# Patient Record
Sex: MALE | Race: WHITE | NOT HISPANIC OR LATINO | Employment: FULL TIME | URBAN - METROPOLITAN AREA
[De-identification: names, ages, dates, MRNs, and addresses within clinical notes are randomized per-mention and may not be internally consistent; named-entity substitution may affect disease eponyms.]

---

## 2018-04-16 ENCOUNTER — HOSPITAL ENCOUNTER (OUTPATIENT)
Dept: RADIOLOGY | Facility: HOSPITAL | Age: 38
Discharge: HOME | End: 2018-04-16
Attending: STUDENT IN AN ORGANIZED HEALTH CARE EDUCATION/TRAINING PROGRAM
Payer: COMMERCIAL

## 2018-04-16 ENCOUNTER — TELEPHONE (OUTPATIENT)
Dept: INTERNAL MEDICINE | Facility: CLINIC | Age: 38
End: 2018-04-16

## 2018-04-16 ENCOUNTER — OFFICE VISIT (OUTPATIENT)
Dept: INTERNAL MEDICINE | Facility: CLINIC | Age: 38
End: 2018-04-16
Payer: COMMERCIAL

## 2018-04-16 VITALS
OXYGEN SATURATION: 98 % | DIASTOLIC BLOOD PRESSURE: 80 MMHG | HEIGHT: 74 IN | WEIGHT: 248.88 LBS | HEART RATE: 67 BPM | SYSTOLIC BLOOD PRESSURE: 132 MMHG | TEMPERATURE: 98 F | BODY MASS INDEX: 31.94 KG/M2 | RESPIRATION RATE: 16 BRPM

## 2018-04-16 DIAGNOSIS — I10 ESSENTIAL HYPERTENSION: ICD-10-CM

## 2018-04-16 DIAGNOSIS — M54.50 ACUTE BILATERAL LOW BACK PAIN WITHOUT SCIATICA: Primary | ICD-10-CM

## 2018-04-16 DIAGNOSIS — M54.50 ACUTE MIDLINE LOW BACK PAIN WITHOUT SCIATICA: ICD-10-CM

## 2018-04-16 PROCEDURE — 3079F DIAST BP 80-89 MM HG: CPT | Mod: CPTII,S$GLB,, | Performed by: INTERNAL MEDICINE

## 2018-04-16 PROCEDURE — 72110 X-RAY EXAM L-2 SPINE 4/>VWS: CPT | Mod: TC,PO

## 2018-04-16 PROCEDURE — 3075F SYST BP GE 130 - 139MM HG: CPT | Mod: CPTII,S$GLB,, | Performed by: INTERNAL MEDICINE

## 2018-04-16 PROCEDURE — 99204 OFFICE O/P NEW MOD 45 MIN: CPT | Mod: S$GLB,,, | Performed by: INTERNAL MEDICINE

## 2018-04-16 PROCEDURE — 72110 X-RAY EXAM L-2 SPINE 4/>VWS: CPT | Mod: 26,,, | Performed by: RADIOLOGY

## 2018-04-16 PROCEDURE — 99999 PR PBB SHADOW E&M-NEW PATIENT-LVL IV: CPT | Mod: PBBFAC,,,

## 2018-04-16 RX ORDER — TIZANIDINE 2 MG/1
4 TABLET ORAL EVERY 6 HOURS PRN
Qty: 20 TABLET | Refills: 0 | Status: SHIPPED | OUTPATIENT
Start: 2018-04-16 | End: 2018-04-26

## 2018-04-16 NOTE — PROGRESS NOTES
"Subjective:       Patient ID: Charlie Mazariegos Jr. is a 37 y.o. male.    Chief Complaint: Back Pain (x 2 months )    Mr. Charlie Mazariegos is a 37 year old male with pars defect who presents for evaluation of back pain. He describes lower back pain, which started about one month ago after doing kettle bell swings at home. He describes the pain as stiffness and tightness that radiates to both hips and down both legs. It is worse with long drives and when he wakes up in the morning, and usually gradually gets better throughout the day. He has been taking 400-800mg advil at home with partial relief of the pain. He has not been exercising or stretching as much lately because of the pain. His mother has ankylosing spondylitis. He denies any bladder or bowel dysfunction, saddle anesthesia, weakness, dysuria, or hematuria.     Mr. Mazariegos works as a  and recently had a work physical during which he was told that he had "borderline high blood pressure".       Back Pain   Pertinent negatives include no abdominal pain, chest pain, dysuria, fever or headaches.     Review of Systems   Constitutional: Negative for chills, fatigue, fever and unexpected weight change.   HENT: Negative for rhinorrhea and sore throat.    Eyes: Negative for pain and redness.   Respiratory: Negative for cough and shortness of breath.    Cardiovascular: Negative for chest pain and palpitations.   Gastrointestinal: Negative for abdominal pain, constipation, diarrhea, nausea and vomiting.   Endocrine: Negative for polydipsia and polyuria.   Genitourinary: Negative for dysuria and hematuria.   Musculoskeletal: Positive for back pain. Negative for neck pain.   Skin: Negative for color change and rash.   Neurological: Negative for light-headedness and headaches.   Hematological: Negative for adenopathy. Does not bruise/bleed easily.   Psychiatric/Behavioral: Negative for confusion. The patient is not nervous/anxious.        Objective:      Physical " Exam   Constitutional: He is oriented to person, place, and time. He appears well-developed and well-nourished. No distress.   HENT:   Head: Normocephalic and atraumatic.   Mouth/Throat: Oropharynx is clear and moist.   Eyes: Conjunctivae and EOM are normal. No scleral icterus.   Neck: Normal range of motion. Neck supple.   Cardiovascular: Normal rate, regular rhythm and normal heart sounds.  Exam reveals no gallop and no friction rub.    No murmur heard.  Pulmonary/Chest: Effort normal and breath sounds normal. No respiratory distress. He has no wheezes. He has no rales.   Abdominal: Soft. Bowel sounds are normal. He exhibits no distension. There is no tenderness. There is no guarding.   Musculoskeletal: Normal range of motion. He exhibits no edema.   Able to forward flex at the hip to about 80 degrees, but unable to perform toe touch   Lymphadenopathy:     He has no cervical adenopathy.   Neurological: He is alert and oriented to person, place, and time.   Skin: Skin is warm and dry. No rash noted. He is not diaphoretic.   Psychiatric: He has a normal mood and affect. His behavior is normal.       Assessment:       1. Acute bilateral low back pain without sciatica    2. Essential hypertension        Plan:       1. Given family history of ankylosing spondylitis and personal history of pars defect, will check XR of lumbar spine. Recommend use of tylenol 1000mg q6h prn back pain. Prescribed Zanaflex prn back pain, recommended patient use this at night as needed. Referral to PT   2. Recommended patient attempt lifestyle changes (diet, exercise, weight loss), and measure blood pressure at home or drugstore, and to return to clinic if he continues to see values >130/90, at which point we would start antihypertensive drug therapy    Anjelica Flores, DO  Internal Medicine, PGY-1    I have interviewed and examined the patient w/ the resident,   I agree w/ the impression and plan as outlined above by  her.  Klarissa Albert MD-Staff

## 2022-10-07 ENCOUNTER — TELEPHONE (OUTPATIENT)
Dept: INTERNAL MEDICINE | Facility: CLINIC | Age: 42
End: 2022-10-07
Payer: COMMERCIAL

## 2022-10-07 NOTE — TELEPHONE ENCOUNTER
----- Message from Leti Dobson MA sent at 10/6/2022  5:05 PM CDT -----  Regarding: FW: Appt  Contact: [phone]    ----- Message -----  From: Sterling Deluna  Sent: 10/6/2022   4:58 PM CDT  To: Ivan Carmen Staff  Subject: Appt                                             Patient is calling to speak with someone in office to schedule an appt for back pain wants to be seen tomorrow. Please contact pt

## 2022-10-07 NOTE — TELEPHONE ENCOUNTER
Patient is calling to speak with someone in office to schedule an appt for back pain wants to be seen tomorrow. Please contact pt

## 2023-01-04 DIAGNOSIS — I10 ESSENTIAL HYPERTENSION: ICD-10-CM

## 2023-01-12 ENCOUNTER — OFFICE VISIT (OUTPATIENT)
Dept: FAMILY MEDICINE | Facility: CLINIC | Age: 43
End: 2023-01-12
Payer: COMMERCIAL

## 2023-01-12 VITALS
HEART RATE: 66 BPM | OXYGEN SATURATION: 98 % | SYSTOLIC BLOOD PRESSURE: 138 MMHG | BODY MASS INDEX: 30.16 KG/M2 | WEIGHT: 235 LBS | HEIGHT: 74 IN | DIASTOLIC BLOOD PRESSURE: 72 MMHG

## 2023-01-12 DIAGNOSIS — Z12.5 PROSTATE CANCER SCREENING: ICD-10-CM

## 2023-01-12 DIAGNOSIS — Z82.69 FAMILY HISTORY OF ANKYLOSING SPONDYLITIS: ICD-10-CM

## 2023-01-12 DIAGNOSIS — Z00.00 ANNUAL PHYSICAL EXAM: Primary | ICD-10-CM

## 2023-01-12 DIAGNOSIS — M54.40 LOW BACK PAIN WITH SCIATICA, SCIATICA LATERALITY UNSPECIFIED, UNSPECIFIED BACK PAIN LATERALITY, UNSPECIFIED CHRONICITY: ICD-10-CM

## 2023-01-12 DIAGNOSIS — Z11.59 ENCOUNTER FOR HEPATITIS C SCREENING TEST FOR LOW RISK PATIENT: ICD-10-CM

## 2023-01-12 DIAGNOSIS — R53.83 FATIGUE, UNSPECIFIED TYPE: ICD-10-CM

## 2023-01-12 DIAGNOSIS — Z53.20 HIV SCREENING DECLINED: ICD-10-CM

## 2023-01-12 DIAGNOSIS — R19.7 DIARRHEA, UNSPECIFIED TYPE: ICD-10-CM

## 2023-01-12 PROCEDURE — 3075F PR MOST RECENT SYSTOLIC BLOOD PRESS GE 130-139MM HG: ICD-10-PCS | Mod: CPTII,S$GLB,, | Performed by: INTERNAL MEDICINE

## 2023-01-12 PROCEDURE — 3008F PR BODY MASS INDEX (BMI) DOCUMENTED: ICD-10-PCS | Mod: CPTII,S$GLB,, | Performed by: INTERNAL MEDICINE

## 2023-01-12 PROCEDURE — 3075F SYST BP GE 130 - 139MM HG: CPT | Mod: CPTII,S$GLB,, | Performed by: INTERNAL MEDICINE

## 2023-01-12 PROCEDURE — 1159F MED LIST DOCD IN RCRD: CPT | Mod: CPTII,S$GLB,, | Performed by: INTERNAL MEDICINE

## 2023-01-12 PROCEDURE — 1159F PR MEDICATION LIST DOCUMENTED IN MEDICAL RECORD: ICD-10-PCS | Mod: CPTII,S$GLB,, | Performed by: INTERNAL MEDICINE

## 2023-01-12 PROCEDURE — 99204 PR OFFICE/OUTPT VISIT, NEW, LEVL IV, 45-59 MIN: ICD-10-PCS | Mod: S$GLB,,, | Performed by: INTERNAL MEDICINE

## 2023-01-12 PROCEDURE — 3008F BODY MASS INDEX DOCD: CPT | Mod: CPTII,S$GLB,, | Performed by: INTERNAL MEDICINE

## 2023-01-12 PROCEDURE — 3078F DIAST BP <80 MM HG: CPT | Mod: CPTII,S$GLB,, | Performed by: INTERNAL MEDICINE

## 2023-01-12 PROCEDURE — 1160F PR REVIEW ALL MEDS BY PRESCRIBER/CLIN PHARMACIST DOCUMENTED: ICD-10-PCS | Mod: CPTII,S$GLB,, | Performed by: INTERNAL MEDICINE

## 2023-01-12 PROCEDURE — 99204 OFFICE O/P NEW MOD 45 MIN: CPT | Mod: S$GLB,,, | Performed by: INTERNAL MEDICINE

## 2023-01-12 PROCEDURE — 99999 PR PBB SHADOW E&M-EST. PATIENT-LVL V: CPT | Mod: PBBFAC,,, | Performed by: INTERNAL MEDICINE

## 2023-01-12 PROCEDURE — 3078F PR MOST RECENT DIASTOLIC BLOOD PRESSURE < 80 MM HG: ICD-10-PCS | Mod: CPTII,S$GLB,, | Performed by: INTERNAL MEDICINE

## 2023-01-12 PROCEDURE — 1160F RVW MEDS BY RX/DR IN RCRD: CPT | Mod: CPTII,S$GLB,, | Performed by: INTERNAL MEDICINE

## 2023-01-12 PROCEDURE — 99999 PR PBB SHADOW E&M-EST. PATIENT-LVL V: ICD-10-PCS | Mod: PBBFAC,,, | Performed by: INTERNAL MEDICINE

## 2023-01-12 RX ORDER — NAPROXEN 500 MG/1
500 TABLET ORAL 2 TIMES DAILY PRN
Qty: 30 TABLET | Refills: 2 | Status: SHIPPED | OUTPATIENT
Start: 2023-01-12

## 2023-01-12 RX ORDER — METHOCARBAMOL 500 MG/1
500 TABLET, FILM COATED ORAL 2 TIMES DAILY PRN
Qty: 20 TABLET | Refills: 0 | Status: SHIPPED | OUTPATIENT
Start: 2023-01-12

## 2023-01-12 RX ORDER — NAPROXEN 500 MG/1
500 TABLET ORAL 2 TIMES DAILY PRN
Qty: 30 TABLET | Refills: 2 | Status: SHIPPED | OUTPATIENT
Start: 2023-01-12 | End: 2023-01-12

## 2023-01-12 NOTE — PROGRESS NOTES
Subjective:       Patient ID: Charlie Mazariegos Jr. is a 42 y.o. male.    Chief Complaint: No chief complaint on file.      HPI  Charlie Mazariegos Jr. is a 42 y.o. male with chronic conditions of hypertension, who presents today to [facility] care.    Patient reports has not had an evaluation with a primary care in over a year.     Has been having a low back pain.  A few weeks ago the pain was significant and limiting.  Took ibuprofen or Tylenol and modified his activities with slow improvement.  The pain was in his lower back and went down his legs.  Denies numbness or weakness.  Now has a mild pain to do 3/10 on his lower back.  His mother recently  and had history of ankylosing spondylitis.  A few years ago he had a back pain while in college and was told he had pars defect.  On an x-ray done a few years ago at [facility] the defect was not found.  The pain has been on an off and he is concern that the severe pain he had a few weeks ago may return.      Recently has been having episodes of diarrhea.  Can not assess see symptoms to specific foods.  With diarrhea has been having upper abdominal discomfort on and off. There has been no significant weight changes but feels tired with his symptoms.      He exercises regularly, 5-6 days of the week weight lifting, which he modify due to his back pains.  Does not follow any special diet.    Health Maintenance:  Health Maintenance   Topic Date Due    Hepatitis C Screening  Never done    TETANUS VACCINE  Never done    Lipid Panel  2019       Review of Systems   Constitutional:  Positive for fatigue. Negative for fever and unexpected weight change.   HENT: Negative.     Respiratory: Negative.     Cardiovascular: Negative.    Gastrointestinal:  Positive for abdominal pain and diarrhea.   Genitourinary:  Negative for difficulty urinating.   Musculoskeletal:  Positive for back pain.   Integumentary:  Negative.   Neurological: Negative.  Negative for headaches.    Psychiatric/Behavioral: Negative.      No past medical history on file.    Past Surgical History:   Procedure Laterality Date    CERVICAL FUSION  2011    C5-C6    SHOULDER SURGERY         Family History   Problem Relation Age of Onset    Osteoarthritis Mother     Ankylosing spondylitis Mother     COPD Mother     Glaucoma Mother     Arthritis Mother         Ankylosing spondylitis    Vision loss Mother         Glaucoma and lenses removed    Gout Father     Breast cancer Maternal Grandmother     Cancer Maternal Grandmother         Breast cancer    Cirrhosis Maternal Grandfather     Alcohol abuse Maternal Grandfather     Colon cancer Paternal Grandmother     Cancer Paternal Grandmother         Colon cancer    No Known Problems Paternal Grandfather        Social History     Socioeconomic History    Marital status: Single   Tobacco Use    Smoking status: Never    Smokeless tobacco: Never   Substance and Sexual Activity    Alcohol use: Yes     Alcohol/week: 3.0 standard drinks     Types: 3 Cans of beer per week     Comment: Occasionally drink on weekends    Drug use: No    Sexual activity: Yes     Partners: Female     Birth control/protection: Condom       Current Outpatient Medications   Medication Sig Dispense Refill    fluticasone (FLONASE) 50 mcg/actuation nasal spray 1 spray by Each Nare route once daily. 16 g 3    ibuprofen (ADVIL,MOTRIN) 200 MG tablet Take 200 mg by mouth every 8 (eight) hours as needed.       No current facility-administered medications for this visit.       Review of patient's allergies indicates:  No Known Allergies      Objective:       Last 3 sets of Vitals    Vitals - 1 value per visit 4/16/2018 4/16/2018 4/16/2018   SYSTOLIC - 136 132   DIASTOLIC - 90 80   Pulse - 67 -   Temp - 97.5 -   Resp - 16 -   SPO2 - 98 -   Weight (lb) - 248.9 -   Weight (kg) - 112.9 -   Height - 74.000 -   BMI (Calculated) - 32 -   VISIT REPORT - - -   Pain Score  7 - -   Physical Exam  Constitutional:        General: He is not in acute distress.  HENT:      Head: Normocephalic.      Right Ear: Tympanic membrane, ear canal and external ear normal.      Left Ear: Tympanic membrane, ear canal and external ear normal.      Nose: Nose normal.      Mouth/Throat:      Mouth: Mucous membranes are moist.   Eyes:      General: No scleral icterus.     Extraocular Movements: Extraocular movements intact.      Conjunctiva/sclera: Conjunctivae normal.   Neck:      Vascular: No carotid bruit.      Comments: No goiter.  Cardiovascular:      Rate and Rhythm: Normal rate and regular rhythm.      Pulses: Normal pulses.      Heart sounds: Normal heart sounds.   Pulmonary:      Effort: Pulmonary effort is normal.      Breath sounds: Normal breath sounds.   Abdominal:      General: Bowel sounds are normal. There is no distension.      Palpations: Abdomen is soft. There is no mass.      Tenderness: There is no abdominal tenderness.   Musculoskeletal:         General: No swelling. Tenderness: reports discomfort of lumbar area palpation.  Lymphadenopathy:      Cervical: No cervical adenopathy.   Skin:     General: Skin is warm and dry.   Neurological:      General: No focal deficit present.      Mental Status: He is alert and oriented to person, place, and time. Mental status is at baseline.   Psychiatric:         Mood and Affect: Mood normal.         Behavior: Behavior normal.         CBC:      CMP:      URINALYSIS:       LIPIDS:      TSH:        A1C:        Imaging:  X-Ray Lumbar Spine Complete 5 View  Narrative: EXAMINATION:  XR LUMBAR SPINE COMPLETE 5 VIEW    CLINICAL HISTORY:  Low back pain, <6wks, no red flags, no prior management;mother with ankylosing spondylitis;  Low back pain    FINDINGS:  No fracture dislocation bone destruction seen.  There is no significant DJD.  No pars defects are seen.  Impression: See above    No significant abnormality seen.    Electronically signed by: Patrick Ochoa  MD  Date:    04/16/2018  Time:    14:49      Assessment:       1. Annual physical exam    2. Diarrhea, unspecified type    3. Fatigue, unspecified type    4. Low back pain with sciatica, sciatica laterality unspecified, unspecified back pain laterality, unspecified chronicity    5. Family history of ankylosing spondylitis    6. Prostate cancer screening    7. Encounter for hepatitis C screening test for low risk patient    8. HIV screening declined            Plan:       1. Annual physical exam  -     CBC Auto Differential; Future; Expected date: 01/12/2023  -     Comprehensive Metabolic Panel; Future; Expected date: 01/12/2023  -     Hemoglobin A1C; Future; Expected date: 01/12/2023  -     Lipid Panel; Future; Expected date: 01/12/2023  -     TSH; Future; Expected date: 01/12/2023  -     PSA, Screening; Future; Expected date: 01/12/2023  -     Urinalysis, Reflex to Urine Culture Urine, Clean Catch; Future; Expected date: 01/12/2023  - stable exam with normal vital signs.  BMI borderline as he is at 30 however he would start with weightlifting and has higher muscle mass.    2. Diarrhea, unspecified type  -     US Abdomen Complete; Future; Expected date: 01/12/2023- considering abdominal discomfort    3. Fatigue, unspecified type  -     Vitamin D; Future; Expected date: 01/12/2023  -     T4, Free; Future; Expected date: 01/12/2023  -     Ferritin; Future; Expected date: 01/12/2023  -     Iron and TIBC; Future; Expected date: 01/12/2023  -     Vitamin B12; Future; Expected date: 01/12/2023  -     Folate; Future; Expected date: 01/12/2023  -     THYROTROPIN RECEPTOR ANTIBODY; Future; Expected date: 01/12/2023  -     THYROID PEROXIDASE ANTIBODY; Future; Expected date: 01/12/2023  -     TESTOSTERONE; Future; Expected date: 01/12/2023  - CRP    4. Low back pain with sciatica, sciatica laterality unspecified, unspecified back pain laterality, unspecified chronicity  -     has concerns family history.  - HLA B27 ANTIGEN;  Future; Expected date: 01/12/2023  -     methocarbamoL (ROBAXIN) 500 MG Tab; Take 1 tablet (500 mg total) by mouth 2 (two) times daily as needed (spasm).  Dispense: 20 tablet; Refill: 0  -     naproxen (NAPROSYN) 500 MG tablet; Take 1 tablet (500 mg total) by mouth 2 (two) times daily as needed (pain).  Dispense: 30 tablet; Refill: 2  -     Urinalysis, Reflex to Urine Culture Urine, Clean Catch; Future; Expected date: 01/12/2023  -     C-Reactive Protein; Future; Expected date: 01/13/2023  -     Sedimentation rate; Future; Expected date: 01/13/2023  -     X-Ray Pelvis Routine AP; Future; Expected date: 01/13/2023  -     X-Ray Lumbar Spine AP And Lateral; Future; Expected date: 01/13/2023    5. Family history of ankylosing spondylitis  -     Ambulatory referral/consult to Genetics; Future; Expected date: 01/12/2023  - if any findings suggestive of inflammatory process will consider rheumatology evaluation    6. Prostate cancer screening  -     PSA, Screening; Future; Expected date: 01/12/2023    7. Encounter for hepatitis C screening test for low risk patient  -     Hepatitis C Antibody; Future; Expected date: 01/12/2023    8. HIV screening declined  -     HIV 1/2 Ag/Ab (4th Gen); Future; Expected date: 01/12/2023           Health Maintenance Due   Topic Date Due    Hepatitis C Screening  Never done    COVID-19 Vaccine (1) Never done    HIV Screening  Never done    TETANUS VACCINE  Never done    Lipid Panel  05/31/2019    Influenza Vaccine (1) Never done            Return to clinic in 1-2 months.    Kanwal Yepez MD  [facility]  Disclaimer:  This note has been generated using voice-recognition software. There may be grammatical or spelling errors that have been missed during proof-reading

## 2023-01-18 ENCOUNTER — LAB VISIT (OUTPATIENT)
Dept: LAB | Facility: HOSPITAL | Age: 43
End: 2023-01-18
Attending: INTERNAL MEDICINE
Payer: COMMERCIAL

## 2023-01-18 DIAGNOSIS — M54.40 LOW BACK PAIN WITH SCIATICA, SCIATICA LATERALITY UNSPECIFIED, UNSPECIFIED BACK PAIN LATERALITY, UNSPECIFIED CHRONICITY: ICD-10-CM

## 2023-01-18 DIAGNOSIS — Z00.00 ANNUAL PHYSICAL EXAM: ICD-10-CM

## 2023-01-18 LAB
BILIRUB UR QL STRIP: NEGATIVE
CLARITY UR: CLEAR
COLOR UR: YELLOW
GLUCOSE UR QL STRIP: NEGATIVE
HGB UR QL STRIP: NEGATIVE
KETONES UR QL STRIP: NEGATIVE
LEUKOCYTE ESTERASE UR QL STRIP: NEGATIVE
NITRITE UR QL STRIP: NEGATIVE
PH UR STRIP: 6 [PH] (ref 5–8)
PROT UR QL STRIP: ABNORMAL
SP GR UR STRIP: 1.02 (ref 1–1.03)
URN SPEC COLLECT METH UR: ABNORMAL
UROBILINOGEN UR STRIP-ACNC: NEGATIVE EU/DL

## 2023-01-18 PROCEDURE — 81003 URINALYSIS AUTO W/O SCOPE: CPT | Performed by: INTERNAL MEDICINE

## 2023-01-19 ENCOUNTER — PATIENT MESSAGE (OUTPATIENT)
Dept: FAMILY MEDICINE | Facility: CLINIC | Age: 43
End: 2023-01-19
Payer: COMMERCIAL

## 2023-01-19 DIAGNOSIS — R19.7 DIARRHEA, UNSPECIFIED TYPE: Primary | ICD-10-CM

## 2023-01-19 RX ORDER — CHOLECALCIFEROL (VITAMIN D3) 25 MCG
1 TABLET,CHEWABLE ORAL DAILY
Qty: 90 CAPSULE | Refills: 2 | Status: SHIPPED | OUTPATIENT
Start: 2023-01-19

## 2023-01-24 ENCOUNTER — TELEPHONE (OUTPATIENT)
Dept: ADMINISTRATIVE | Facility: OTHER | Age: 43
End: 2023-01-24
Payer: COMMERCIAL

## 2023-01-26 ENCOUNTER — HOSPITAL ENCOUNTER (OUTPATIENT)
Dept: RADIOLOGY | Facility: HOSPITAL | Age: 43
Discharge: HOME | End: 2023-01-26
Attending: INTERNAL MEDICINE
Payer: COMMERCIAL

## 2023-01-26 DIAGNOSIS — M54.40 LOW BACK PAIN WITH SCIATICA, SCIATICA LATERALITY UNSPECIFIED, UNSPECIFIED BACK PAIN LATERALITY, UNSPECIFIED CHRONICITY: ICD-10-CM

## 2023-01-26 DIAGNOSIS — R19.7 DIARRHEA, UNSPECIFIED TYPE: ICD-10-CM

## 2023-01-26 PROCEDURE — 72100 XR LUMBAR SPINE AP AND LATERAL: ICD-10-PCS | Mod: 26,,, | Performed by: RADIOLOGY

## 2023-01-26 PROCEDURE — 76700 US EXAM ABDOM COMPLETE: CPT | Mod: TC

## 2023-01-26 PROCEDURE — 72100 X-RAY EXAM L-S SPINE 2/3 VWS: CPT | Mod: TC

## 2023-01-26 PROCEDURE — 72100 X-RAY EXAM L-S SPINE 2/3 VWS: CPT | Mod: 26,,, | Performed by: RADIOLOGY

## 2023-01-26 PROCEDURE — 76700 US EXAM ABDOM COMPLETE: CPT | Mod: 26,,, | Performed by: RADIOLOGY

## 2023-01-26 PROCEDURE — 72170 X-RAY EXAM OF PELVIS: CPT | Mod: 26,,, | Performed by: RADIOLOGY

## 2023-01-26 PROCEDURE — 76700 US ABDOMEN COMPLETE: ICD-10-PCS | Mod: 26,,, | Performed by: RADIOLOGY

## 2023-01-26 PROCEDURE — 72170 X-RAY EXAM OF PELVIS: CPT | Mod: TC

## 2023-01-26 PROCEDURE — 72170 XR PELVIS ROUTINE AP: ICD-10-PCS | Mod: 26,,, | Performed by: RADIOLOGY

## 2023-01-29 ENCOUNTER — PATIENT MESSAGE (OUTPATIENT)
Dept: FAMILY MEDICINE | Facility: CLINIC | Age: 43
End: 2023-01-29
Payer: COMMERCIAL

## 2023-04-04 ENCOUNTER — OFFICE VISIT (OUTPATIENT)
Dept: FAMILY MEDICINE | Facility: CLINIC | Age: 43
End: 2023-04-04
Payer: COMMERCIAL

## 2023-04-04 VITALS
SYSTOLIC BLOOD PRESSURE: 122 MMHG | DIASTOLIC BLOOD PRESSURE: 82 MMHG | OXYGEN SATURATION: 98 % | BODY MASS INDEX: 30.7 KG/M2 | HEIGHT: 74 IN | WEIGHT: 239.19 LBS | TEMPERATURE: 98 F | HEART RATE: 71 BPM

## 2023-04-04 DIAGNOSIS — M54.40 LOW BACK PAIN WITH SCIATICA, SCIATICA LATERALITY UNSPECIFIED, UNSPECIFIED BACK PAIN LATERALITY, UNSPECIFIED CHRONICITY: Primary | ICD-10-CM

## 2023-04-04 DIAGNOSIS — R03.0 DIASTOLIC BLOOD PRESSURE 80 TO 89 MM HG: ICD-10-CM

## 2023-04-04 DIAGNOSIS — E55.9 VITAMIN D DEFICIENCY: ICD-10-CM

## 2023-04-04 PROCEDURE — 90715 TDAP VACCINE 7 YRS/> IM: CPT | Mod: S$GLB,,, | Performed by: INTERNAL MEDICINE

## 2023-04-04 PROCEDURE — 90715 TDAP VACCINE GREATER THAN OR EQUAL TO 7YO IM: ICD-10-PCS | Mod: S$GLB,,, | Performed by: INTERNAL MEDICINE

## 2023-04-04 PROCEDURE — 90471 IMMUNIZATION ADMIN: CPT | Mod: S$GLB,,, | Performed by: INTERNAL MEDICINE

## 2023-04-04 PROCEDURE — 3008F BODY MASS INDEX DOCD: CPT | Mod: CPTII,S$GLB,, | Performed by: INTERNAL MEDICINE

## 2023-04-04 PROCEDURE — 1159F PR MEDICATION LIST DOCUMENTED IN MEDICAL RECORD: ICD-10-PCS | Mod: CPTII,S$GLB,, | Performed by: INTERNAL MEDICINE

## 2023-04-04 PROCEDURE — 90471 TDAP VACCINE GREATER THAN OR EQUAL TO 7YO IM: ICD-10-PCS | Mod: S$GLB,,, | Performed by: INTERNAL MEDICINE

## 2023-04-04 PROCEDURE — 99214 OFFICE O/P EST MOD 30 MIN: CPT | Mod: 25,S$GLB,, | Performed by: INTERNAL MEDICINE

## 2023-04-04 PROCEDURE — 1160F PR REVIEW ALL MEDS BY PRESCRIBER/CLIN PHARMACIST DOCUMENTED: ICD-10-PCS | Mod: CPTII,S$GLB,, | Performed by: INTERNAL MEDICINE

## 2023-04-04 PROCEDURE — 3079F PR MOST RECENT DIASTOLIC BLOOD PRESSURE 80-89 MM HG: ICD-10-PCS | Mod: CPTII,S$GLB,, | Performed by: INTERNAL MEDICINE

## 2023-04-04 PROCEDURE — 3044F PR MOST RECENT HEMOGLOBIN A1C LEVEL <7.0%: ICD-10-PCS | Mod: CPTII,S$GLB,, | Performed by: INTERNAL MEDICINE

## 2023-04-04 PROCEDURE — 3074F PR MOST RECENT SYSTOLIC BLOOD PRESSURE < 130 MM HG: ICD-10-PCS | Mod: CPTII,S$GLB,, | Performed by: INTERNAL MEDICINE

## 2023-04-04 PROCEDURE — 99214 PR OFFICE/OUTPT VISIT, EST, LEVL IV, 30-39 MIN: ICD-10-PCS | Mod: 25,S$GLB,, | Performed by: INTERNAL MEDICINE

## 2023-04-04 PROCEDURE — 99999 PR PBB SHADOW E&M-EST. PATIENT-LVL IV: CPT | Mod: PBBFAC,,, | Performed by: INTERNAL MEDICINE

## 2023-04-04 PROCEDURE — 99999 PR PBB SHADOW E&M-EST. PATIENT-LVL IV: ICD-10-PCS | Mod: PBBFAC,,, | Performed by: INTERNAL MEDICINE

## 2023-04-04 PROCEDURE — 1160F RVW MEDS BY RX/DR IN RCRD: CPT | Mod: CPTII,S$GLB,, | Performed by: INTERNAL MEDICINE

## 2023-04-04 PROCEDURE — 3044F HG A1C LEVEL LT 7.0%: CPT | Mod: CPTII,S$GLB,, | Performed by: INTERNAL MEDICINE

## 2023-04-04 PROCEDURE — 3079F DIAST BP 80-89 MM HG: CPT | Mod: CPTII,S$GLB,, | Performed by: INTERNAL MEDICINE

## 2023-04-04 PROCEDURE — 3008F PR BODY MASS INDEX (BMI) DOCUMENTED: ICD-10-PCS | Mod: CPTII,S$GLB,, | Performed by: INTERNAL MEDICINE

## 2023-04-04 PROCEDURE — 1159F MED LIST DOCD IN RCRD: CPT | Mod: CPTII,S$GLB,, | Performed by: INTERNAL MEDICINE

## 2023-04-04 PROCEDURE — 3074F SYST BP LT 130 MM HG: CPT | Mod: CPTII,S$GLB,, | Performed by: INTERNAL MEDICINE

## 2023-04-04 RX ORDER — CHOLECALCIFEROL (VITAMIN D3) 50 MCG
1 TABLET ORAL DAILY
Qty: 90 TABLET | Refills: 3 | Status: SHIPPED | OUTPATIENT
Start: 2023-04-04

## 2023-04-04 NOTE — PROGRESS NOTES
Subjective:       Patient ID: Charlie Mazariegos Jr. is a 42 y.o. male.    Chief Complaint: Follow-up (8 week/)      HPI  Charlie Mazariegos Jr. is a 42 y.o. male with chronic conditions of HTN who presents today for follow-up of evaluation of fatigue, and back pain.    Reports his back pain has improved and has not needed to use a anti-inflammatories or the muscle relaxers.    Workup was overall within normal limits except for low vitamin-D and B12 on the low side.  The CBC was within normal limits, iron and folic acid are normal, B12 is normal on the low side.  Sed rate and CRP were normal.  Metabolic panel with good kidney and liver function.  Glucose and A1c are normal.  Lipid profile looks fine.  Thyroid function test is within normal limits.  Testosterone and PSA are normal.  Vitamin-D was low.    Lumbar and pelvic x-rays show degenerative joint disease and no significant abnormality.  No sacroiliitis.  Abdominal ultrasound normal.    Has no toxic habits.  Exercises with weight lifting or training.  Denies concerning exertional symptoms.    Health Maintenance:  Health Maintenance   Topic Date Due    Lipid Panel  01/18/2028    TETANUS VACCINE  04/04/2033    Hepatitis C Screening  Completed       Review of Systems   Constitutional: Negative.  Negative for fever and unexpected weight change.   HENT: Negative.     Respiratory: Negative.     Cardiovascular: Negative.    Gastrointestinal: Negative.    Genitourinary:  Negative for difficulty urinating.   Musculoskeletal: Negative.    Integumentary:  Negative.   Neurological: Negative.    Psychiatric/Behavioral:  Positive for sleep disturbance (related to working hours).     Past Medical History:   Diagnosis Date    Pre-hypertension        Past Surgical History:   Procedure Laterality Date    CERVICAL FUSION  01/01/2011    C5-C6    FRACTURE SURGERY      Unknown date neck surgery    SHOULDER SURGERY      SPINE SURGERY      Unknown date neck surgery       Family History    Problem Relation Age of Onset    Osteoarthritis Mother     Ankylosing spondylitis Mother     COPD Mother     Glaucoma Mother     Arthritis Mother         Ankylosing spondylitis    Vision loss Mother         Glaucoma and lenses removed    Gout Father     Breast cancer Maternal Grandmother     Cancer Maternal Grandmother         Breast cancer    Cirrhosis Maternal Grandfather     Alcohol abuse Maternal Grandfather     Colon cancer Paternal Grandmother     Cancer Paternal Grandmother         Colon cancer    No Known Problems Paternal Grandfather        Social History     Socioeconomic History    Marital status: Single   Tobacco Use    Smoking status: Never    Smokeless tobacco: Never   Substance and Sexual Activity    Alcohol use: Yes     Alcohol/week: 3.0 standard drinks     Types: 3 Cans of beer per week     Comment: Occasionally drink on weekends    Drug use: No    Sexual activity: Yes     Partners: Female     Birth control/protection: Condom       Current Outpatient Medications   Medication Sig Dispense Refill    cholecalciferol, vitamin D3, (VITAMIN D3) 50 mcg (2,000 unit) Tab Take 1 tablet (2,000 Units total) by mouth once daily. 90 tablet 3    cyanocobalamin, vitamin B-12, 1,000 mcg Cap Take 1 tablet by mouth Daily. (Patient not taking: Reported on 4/4/2023) 90 capsule 2    fluticasone (FLONASE) 50 mcg/actuation nasal spray 1 spray by Each Nare route once daily. (Patient not taking: Reported on 4/4/2023) 16 g 3    methocarbamoL (ROBAXIN) 500 MG Tab Take 1 tablet (500 mg total) by mouth 2 (two) times daily as needed (spasm). (Patient not taking: Reported on 4/4/2023) 20 tablet 0    naproxen (NAPROSYN) 500 MG tablet Take 1 tablet (500 mg total) by mouth 2 (two) times daily as needed (pain). (Patient not taking: Reported on 4/4/2023) 30 tablet 2     No current facility-administered medications for this visit.       Review of patient's allergies indicates:  No Known Allergies      Objective:       Last 3 sets of  Vitals    Vitals - 1 value per visit 1/12/2023 4/4/2023 4/4/2023   SYSTOLIC 138 - 122   DIASTOLIC 72 - 82   Pulse 66 - 71   Temp - - 97.9   Resp - - -   SPO2 98 - 98   Weight (lb) 235.01 - 239.2   Weight (kg) 106.6 - 108.5   Height 74 - 74   BMI (Calculated) 30.2 - 30.7   VISIT REPORT - - -   Pain Score  - 0 -   Physical Exam  Constitutional:       General: He is not in acute distress.  HENT:      Head: Normocephalic.      Right Ear: Tympanic membrane, ear canal and external ear normal.      Left Ear: Tympanic membrane, ear canal and external ear normal.      Nose: Nose normal.      Mouth/Throat:      Mouth: Mucous membranes are moist.   Eyes:      General: No scleral icterus.     Extraocular Movements: Extraocular movements intact.      Conjunctiva/sclera: Conjunctivae normal.   Neck:      Vascular: No carotid bruit.      Comments: No goiter.  Cardiovascular:      Rate and Rhythm: Normal rate and regular rhythm.      Pulses: Normal pulses.      Heart sounds: Normal heart sounds.   Pulmonary:      Effort: Pulmonary effort is normal.      Breath sounds: Normal breath sounds.   Abdominal:      General: Bowel sounds are normal. There is no distension.      Palpations: Abdomen is soft. There is no mass.      Tenderness: There is no abdominal tenderness.   Musculoskeletal:         General: No swelling.   Lymphadenopathy:      Cervical: No cervical adenopathy.   Skin:     General: Skin is warm and dry.   Neurological:      General: No focal deficit present.      Mental Status: He is alert and oriented to person, place, and time.   Psychiatric:         Mood and Affect: Mood normal.         Behavior: Behavior normal.         CBC:  Recent Labs   Lab 01/18/23  0730   WBC 4.26   RBC 4.91   Hemoglobin 15.1   Hematocrit 42.8   Platelets 198   MCV 87   MCH 30.8   MCHC 35.3     CMP:  Recent Labs   Lab 01/18/23  0730   Glucose 85   Calcium 9.0   Albumin 4.0   Total Protein 7.2   Sodium 141   Potassium 3.7   CO2 25   Chloride 105    BUN 9   Creatinine 1.0   Alkaline Phosphatase 77   ALT 23   AST 21   Total Bilirubin 0.8     URINALYSIS:  Recent Labs   Lab 01/18/23  0646   Color, UA Yellow   Specific Gravity, UA 1.020   pH, UA 6.0   Protein, UA Trace A   Nitrite, UA Negative   Leukocytes, UA Negative   Urobilinogen, UA Negative      LIPIDS:  Recent Labs   Lab 01/18/23  0730   TSH 3.569   HDL 45   Cholesterol 172   Triglycerides 91   LDL Cholesterol 108.8   HDL/Cholesterol Ratio 26.2   Non-HDL Cholesterol 127   Total Cholesterol/HDL Ratio 3.8     TSH:  Recent Labs   Lab 01/18/23  0730   TSH 3.569       A1C:  Recent Labs   Lab 01/18/23  0730   Hemoglobin A1C 5.2       Imaging:  X-Ray Pelvis Routine AP  Narrative: EXAMINATION:  XR PELVIS ROUTINE AP    CLINICAL HISTORY:  Lumbago with sciatica, unspecified side    TECHNIQUE:  AP view of the pelvis was performed.    COMPARISON:  02/26/2011    FINDINGS:  Single-view pelvis.    The bilateral sacroiliac joints are intact.  The pubic symphysis is intact noting degenerative changes.  The bilateral femoroacetabular joints are intact.  Impression: 1. No acute displaced fracture or dislocation of the pelvis.    Electronically signed by: Arun Hdz MD  Date:    01/26/2023  Time:    08:49  X-Ray Lumbar Spine AP And Lateral  Narrative: EXAMINATION:  XR LUMBAR SPINE AP AND LATERAL    CLINICAL HISTORY:  Lumbago with sciatica, unspecified side    TECHNIQUE:  AP, lateral and spot images were performed of the lumbar spine.    COMPARISON:  04/16/2018    FINDINGS:  Three views lumbar spine.    Lateral imaging demonstrates adequate alignment of the lumbar spine without significant vertebral body height loss.  There is disc space height loss at L5-S1, progressed since the previous exam.  There is lower lumbar facet arthropathy, also progressed since the previous exam.  The sacral segments are aligned.  AP spinal alignment is unremarkable.  The bilateral sacroiliac joints are intact.  Impression: 1. No acute  displaced fracture or dislocation of the lumbar spine noting degenerative changes as above.    Electronically signed by: Arun Hdz MD  Date:    01/26/2023  Time:    08:48  US Abdomen Complete  Narrative: EXAMINATION:  US ABDOMEN COMPLETE    CLINICAL HISTORY:  Diarrhea, unspecified    TECHNIQUE:  Complete abdominal ultrasound (including pancreas, liver, gallbladder, common bile duct, spleen, aorta, IVC, and kidneys) was performed.    COMPARISON:  None    FINDINGS:  Pancreas: The visualized portions of pancreas appear normal.    Aorta: No aneurysm.    Liver: 15.2 cm, normal in size. Homogeneous parenchymal echotexture. No focal lesions. HRI: 1.1, normal.    Gallbladder: No calculi, wall thickening, or pericholecystic fluid.  No wall hyperemia.  Negative sonographic Graves's sign.    Biliary system: 3 mm common bile duct.  No intrahepatic ductal dilatation.    Inferior vena cava: Normal in appearance.    Right kidney: 10.6 cm. No hydronephrosis.    Left kidney: 11.4 cm. No hydronephrosis.    Spleen: 12.6 x 4.7 cm.  Normal in size with homogeneous echotexture.    Miscellaneous: No ascites.  Impression: No sonographic abnormality.    Electronically signed by resident: Kieran Ellis  Date:    01/26/2023  Time:    07:54    Electronically signed by: Man Kelly MD  Date:    01/26/2023  Time:    08:08      Assessment:       1. Low back pain with sciatica, sciatica laterality unspecified, unspecified back pain laterality, unspecified chronicity    2. Diastolic blood pressure 80 to 89 mm Hg            Plan:       1. Low back pain with sciatica, sciatica laterality unspecified, unspecified back pain laterality, unspecified chronicity  - Improved.  Likely spasms.  Continue anti-inflammatories and muscle relaxers as needed.     2. Diastolic blood pressure 80 to 89 mm Hg   - Continue lifestyle modifications with diet and exercise, weight control.    3. Vitamin D deficiency  -     cholecalciferol, vitamin D3, (VITAMIN D3)  50 mcg (2,000 unit) Tab; Take 1 tablet (2,000 Units total) by mouth once daily.  Dispense: 90 tablet; Refill: 3    Other orders  -     (In Office Administered) Tdap Vaccine  - B12 supplements would be reasonable       Health Maintenance Due   Topic Date Due    COVID-19 Vaccine (3 - Booster for Pfizer series) 04/15/2021    Influenza Vaccine (1) Never done            Return to clinic yearly or as needed.    Kanwal Yepez MD  [facility]  Disclaimer:  This note has been generated using voice-recognition software. There may be grammatical or spelling errors that have been missed during proof-reading

## 2025-02-21 ENCOUNTER — TELEPHONE (OUTPATIENT)
Dept: ORTHOPEDICS | Facility: CLINIC | Age: 45
End: 2025-02-21
Payer: COMMERCIAL

## 2025-02-21 NOTE — TELEPHONE ENCOUNTER
Spoke w/ pt to schedule appt for frac, he's schedule to see Lisandra 2/25/25.    ----- Message from Med Assistant Couch sent at 2/21/2025  1:35 PM CST -----  Regarding: RE: call back  He was but he needs to be scheduled in hand clinic per Laurie Tran.  ----- Message -----  From: Shelia Ma MA  Sent: 2/21/2025   1:30 PM CST  To: Iza Boland MA  Subject: FW: call back                                    Lotus,Just called the pt and he stated he was told by someone to keep the Ortho appt.  ----- Message -----  From: Iza Boland MA  Sent: 2/21/2025   1:24 PM CST  To: [facility] Hand Clinical Staff; [facility] Hand Clinical#  Subject: FW: call back                                    Pt has a fx elbow  ----- Message -----  From: Catina Sinclair  Sent: 2/21/2025  12:18 PM CST  To: [facility] Ortho Clinical Support  Subject: call back                                        Type: Patient Call BackWho called: pt What is the request in detail: requesting call to see if he needs to keep appt for ortho on Monday or be rescheduled for the Hand clinic? Says staff called him today but no notes in chart. Can the clinic reply by [facility]?noWould the patient rather a call back or a response via [facility]? [facility]: [phone] Additional Information:

## 2025-02-24 ENCOUNTER — HOSPITAL ENCOUNTER (OUTPATIENT)
Dept: RADIOLOGY | Facility: OTHER | Age: 45
Discharge: HOME | End: 2025-02-24
Payer: COMMERCIAL

## 2025-02-24 ENCOUNTER — OFFICE VISIT (OUTPATIENT)
Dept: ORTHOPEDICS | Facility: CLINIC | Age: 45
End: 2025-02-24
Payer: COMMERCIAL

## 2025-02-24 ENCOUNTER — PATIENT MESSAGE (OUTPATIENT)
Dept: PREADMISSION TESTING | Facility: HOSPITAL | Age: 45
End: 2025-02-24
Payer: COMMERCIAL

## 2025-02-24 DIAGNOSIS — S42.402A CLOSED FRACTURE OF LEFT ELBOW, INITIAL ENCOUNTER: ICD-10-CM

## 2025-02-24 DIAGNOSIS — M25.522 LEFT ELBOW PAIN: Primary | ICD-10-CM

## 2025-02-24 DIAGNOSIS — S42.402A CLOSED FRACTURE OF LEFT ELBOW, INITIAL ENCOUNTER: Primary | ICD-10-CM

## 2025-02-24 DIAGNOSIS — M25.522 LEFT ELBOW PAIN: ICD-10-CM

## 2025-02-24 PROCEDURE — 73200 CT UPPER EXTREMITY W/O DYE: CPT | Mod: 26,LT,, | Performed by: INTERNAL MEDICINE

## 2025-02-24 PROCEDURE — 73200 CT UPPER EXTREMITY W/O DYE: CPT | Mod: TC,LT

## 2025-02-24 PROCEDURE — 1160F RVW MEDS BY RX/DR IN RCRD: CPT | Mod: CPTII,S$GLB,,

## 2025-02-24 PROCEDURE — 99999 PR PBB SHADOW E&M-EST. PATIENT-LVL III: CPT | Mod: PBBFAC,,,

## 2025-02-24 PROCEDURE — 99204 OFFICE O/P NEW MOD 45 MIN: CPT | Mod: S$GLB,,,

## 2025-02-24 PROCEDURE — 73080 X-RAY EXAM OF ELBOW: CPT | Mod: TC,FY,LT

## 2025-02-24 PROCEDURE — 1159F MED LIST DOCD IN RCRD: CPT | Mod: CPTII,S$GLB,,

## 2025-02-24 PROCEDURE — 73080 X-RAY EXAM OF ELBOW: CPT | Mod: 26,LT,, | Performed by: RADIOLOGY

## 2025-02-24 RX ORDER — OXYCODONE AND ACETAMINOPHEN 5; 325 MG/1; MG/1
1 TABLET ORAL EVERY 8 HOURS PRN
Qty: 6 TABLET | Refills: 0 | Status: SHIPPED | OUTPATIENT
Start: 2025-02-24 | End: 2025-02-25 | Stop reason: HOSPADM

## 2025-02-24 RX ORDER — MUPIROCIN 20 MG/G
OINTMENT TOPICAL
Status: CANCELLED | OUTPATIENT
Start: 2025-02-24

## 2025-02-24 RX ORDER — CEFAZOLIN SODIUM 2 G/50ML
2 SOLUTION INTRAVENOUS
Status: CANCELLED | OUTPATIENT
Start: 2025-02-24

## 2025-02-24 RX ORDER — SODIUM CHLORIDE 9 MG/ML
INJECTION, SOLUTION INTRAVENOUS CONTINUOUS
Status: CANCELLED | OUTPATIENT
Start: 2025-02-24

## 2025-02-24 NOTE — H&P (VIEW-ONLY)
[facility]  History & Physical  Orthopedics    SUBJECTIVE:      Chief Complaint:  Left elbow fracture    Referring Provider: N/A    Dr. Euceda is the supervising physician for this encounter/patient    History of Present Illness:  Patient is a 44 y.o. right hand dominant male who presents today with complaints of left elbow pain since sustaining a mechanical slip and fall on ice on February 19, 2025.  The patient presented to an external ED where x-rays of the left elbow revealed an elbow dislocation and small osseous fractures along the anterior elbow possibly originating from the coronoid process of the ulna.  The patient was reduced in the ED and placed in a posterior plaster slab splint and prescribed oxycodone.  He presents today in 8/10 pain.  He denies numbness, tingling, and burning sensations to the left upper extremity.  He does note increased pain with activity, movement, supination and pronation of the left elbow.  He reports he previously had a right SLAP tear repair with Dr. Harmon in 2014 for which is doing well.  He presents today for initial evaluation no further complaints.    The patient is a/an works for [employer].    Onset of symptoms/DOI was February 19, 2025.    Symptoms are aggravated by activity, movement, elbow supination, elbow pronation    Symptoms are alleviated by rest and immobilization.    Symptoms consist of pain, swelling, ecchymosis, and decreased ROM.    The patient rates their pain as a 8/10.    Attempted treatment(s) and/or interventions include activity modifications, rest, closed reduction in the emergency department, splinting, immobilization, narcotic medications, oral anti-inflammatories.     The patient denies any fevers, chills, N/V, D/C and presents for evaluation.       Past Medical History:   Diagnosis Date    Pre-hypertension      Past Surgical History:   Procedure Laterality Date    CERVICAL FUSION  01/01/2011    C5-C6    FRACTURE  SURGERY      Unknown date neck surgery    SHOULDER SURGERY      SPINE SURGERY      Unknown date neck surgery     Review of patient's allergies indicates:  No Known Allergies  Social History     Social History Narrative    Not on file     Family History   Problem Relation Name Age of Onset    Osteoarthritis Mother Kelly Mazariegos     Ankylosing spondylitis Mother Kelly Mazariegos     COPD Mother Kelly Mazariegos     Glaucoma Mother Kelly Mazariegos     Arthritis Mother Kelly Mazariegos         Ankylosing spondylitis    Vision loss Mother Kelly Mazariegos         Glaucoma and lenses removed    Gout Father      Breast cancer Maternal Grandmother Dee Romano     Cancer Maternal Grandmother Dee Romano         Breast cancer    Cirrhosis Maternal Grandfather      Alcohol abuse Maternal Grandfather      Colon cancer Paternal Grandmother Yvrose Mazariegos     Cancer Paternal Grandmother Yvrose Mazariegos         Colon cancer    No Known Problems Paternal Grandfather         Current Medications[1]    Review of Systems:  Constitutional: no fever or chills  Eyes: no visual changes  ENT: no nasal congestion or sore throat  Respiratory: no cough or shortness of breath  Cardiovascular: no chest pain  Gastrointestinal: no nausea or vomiting, tolerating diet  Musculoskeletal: pain, soreness, and decreased ROM    OBJECTIVE:      Vital Signs (Most Recent):  There were no vitals filed for this visit.  There is no height or weight on file to calculate BMI.      Physical Exam:  Constitutional: The patient appears well-developed and well-nourished. No distress.   Skin: No lesions appreciated  Head: Normocephalic and atraumatic.   Nose: Nose normal.   Ears: No deformities seen  Eyes: Conjunctivae and EOM are normal.   Neck: No tracheal deviation present.   Cardiovascular: Normal rate and intact distal pulses.    Pulmonary/Chest: Effort normal. No respiratory distress.   Abdominal: There is no guarding.   Neurological: The patient is alert.   Psychiatric:  The patient has a normal mood and affect.     Left Hand/Wrist Examination:    Observation/Inspection:  Swelling  moderate to the left elbow and distal digits  Deformity  yellowish ecchymosis noted to the lateral edge of the left elbow  Discoloration  none     Scars   none    Atrophy  none    HAND/WRIST EXAMINATION:  He is tender to palpate the radial head of the left elbow  He is tender to palpate the coronoid process of the ulna of the left elbow  He is nontender to palpate the olecranon process, medial epicondyle, and lateral epicondyle    Neurovascular Exam:  Digits WWP, brisk CR < 3s throughout  NVI motor/LTS to M/R/U nerves, radial pulse 2+  Tinel's Test - Carpal Tunnel  Neg  Tinel's Test - Cubital Tunnel  Neg  Phalen's Test    Neg  Median Nerve Compression Test Neg    ROM hand he is able to make a full composite fist with the left hand with notable edema to the distal digits    ROM wrist limited and restricted due to known injury    ROM elbow limited and restricted due to known injury    Abdomen not guarded  Respirations nonlabored  Perfusion intact    Diagnostic Results:     Imaging - I independently viewed the patient's imaging as well as the radiology report.  Xrays of the patient's left elbow x-rays demonstrated displaced fracture fragment along the anterior aspect of the elbow possibly correlated with the coronoid process of the ulna.  There is a possible nondisplaced radial head fracture on x-ray.  There is anterior and posterior fat pad signaling noted.  Soft tissue swelling noted diffusely.  No additional fractures or dislocations noted.      FINDINGS:  Displaced fracture fragment located along the anterior aspect of the elbow.  Donor site may be the coronoid process of the ulna.     No additional fractures.  Small anterior elbow joint effusion.  Soft tissue edema.    EMG - none    ASSESSMENT/PLAN:      44 y.o. yo male with left coronoid process fracture of the ulna with displacement, possible left  radial head nondisplaced fracture, and possible ligament injury    Plan: The patient and I had a thorough discussion today.  We discussed the working diagnosis as well as several other potential alternative diagnoses.  Treatment options were discussed, both conservative and surgical.  Conservative treatment options would include things such as activity modifications, workplace modifications, a period of rest, oral vs topical OTC and prescription anti-inflammatory medications, occupational therapy, splinting/bracing, immobilization, corticosteroid injections, and others.  Surgical options were discussed as well.     At this time, the patient has a left coronoid process fracture with displacement.  We will obtain a CT of the left elbow to evaluate the comminution of this fracture.  We also will obtain a CT to rule out a radial head fracture.  The patient would like to consent for surgery at this time.  A surgical consent was obtained in office for a open reduction internal fixation of the left coronoid process of the ulna, possible ligament repair, possible radial head ORIF, and any indicated procedures with Dr. Euceda at [facility].  Preoperative and postoperative paperwork reviewed in the clinic today.  Preoperative orders signed and held for [facility].  The patient does not require preoperative clearance.  We will place the patient in a left locked hinged brace at 105° of extension.  He is to continue utilizing this brace until surgery or until re-evaluation.  Depending on the patient's CT, we will determine the treatment course.  Refill of oxycodone 5 mg sent to patient's pharmacy per his request.  He will follow up 2 weeks postoperatively with updated elbow x-rays of the left elbow out of splint or sooner for any problems.    Should the patient's symptoms worsen, persist, or fail to improve they should return for reevaluation and I would be happy to see them back anytime.    Please do not hesitate to reach out to us  via email, phone, or MyChart with any questions, concerns, or feedback.    Disclaimer:  This note is prepared using voice recognition software and as such is likely to have errors and has not been proof read. Please contact me for questions.     Lisandra Lazaro PA-C  Orthopedic/Hand Surgery                            [1]   Current Outpatient Medications:     cholecalciferol, vitamin D3, (VITAMIN D3) 50 mcg (2,000 unit) Tab, Take 1 tablet (2,000 Units total) by mouth once daily., Disp: 90 tablet, Rfl: 3    cyanocobalamin, vitamin B-12, 1,000 mcg Cap, Take 1 tablet by mouth Daily. (Patient not taking: Reported on 4/4/2023), Disp: 90 capsule, Rfl: 2    fluticasone (FLONASE) 50 mcg/actuation nasal spray, 1 spray by Each Nare route once daily. (Patient not taking: Reported on 4/4/2023), Disp: 16 g, Rfl: 3    methocarbamoL (ROBAXIN) 500 MG Tab, Take 1 tablet (500 mg total) by mouth 2 (two) times daily as needed (spasm). (Patient not taking: Reported on 4/4/2023), Disp: 20 tablet, Rfl: 0    naproxen (NAPROSYN) 500 MG tablet, Take 1 tablet (500 mg total) by mouth 2 (two) times daily as needed (pain). (Patient not taking: Reported on 4/4/2023), Disp: 30 tablet, Rfl: 2

## 2025-02-24 NOTE — PROGRESS NOTES
[facility]  History & Physical  Orthopedics    SUBJECTIVE:      Chief Complaint:  Left elbow fracture    Referring Provider: N/A    Dr. Euceda is the supervising physician for this encounter/patient    History of Present Illness:  Patient is a 44 y.o. right hand dominant male who presents today with complaints of left elbow pain since sustaining a mechanical slip and fall on ice on February 19, 2025.  The patient presented to an external ED where x-rays of the left elbow revealed an elbow dislocation and small osseous fractures along the anterior elbow possibly originating from the coronoid process of the ulna.  The patient was reduced in the ED and placed in a posterior plaster slab splint and prescribed oxycodone.  He presents today in 8/10 pain.  He denies numbness, tingling, and burning sensations to the left upper extremity.  He does note increased pain with activity, movement, supination and pronation of the left elbow.  He reports he previously had a right SLAP tear repair with Dr. Harmon in 2014 for which is doing well.  He presents today for initial evaluation no further complaints.    The patient is a/an works for the [employer].    Onset of symptoms/DOI was February 19, 2025.    Symptoms are aggravated by activity, movement, elbow supination, elbow pronation    Symptoms are alleviated by rest and immobilization.    Symptoms consist of pain, swelling, ecchymosis, and decreased ROM.    The patient rates their pain as a 8/10.    Attempted treatment(s) and/or interventions include activity modifications, rest, closed reduction in the emergency department, splinting, immobilization, narcotic medications, oral anti-inflammatories.     The patient denies any fevers, chills, N/V, D/C and presents for evaluation.       Past Medical History:   Diagnosis Date    Pre-hypertension      Past Surgical History:   Procedure Laterality Date    CERVICAL FUSION  01/01/2011    C5-C6    FRACTURE  SURGERY      Unknown date neck surgery    SHOULDER SURGERY      SPINE SURGERY      Unknown date neck surgery     Review of patient's allergies indicates:  No Known Allergies  Social History     Social History Narrative    Not on file     Family History   Problem Relation Name Age of Onset    Osteoarthritis Mother Kelly Mazariegos     Ankylosing spondylitis Mother Kelly Mazariegos     COPD Mother Kelly Mazariegos     Glaucoma Mother Kelly Mazariegos     Arthritis Mother Kelly Mazariegos         Ankylosing spondylitis    Vision loss Mother Kelly Mazariegos         Glaucoma and lenses removed    Gout Father      Breast cancer Maternal Grandmother Dee Romano     Cancer Maternal Grandmother Dee Romano         Breast cancer    Cirrhosis Maternal Grandfather      Alcohol abuse Maternal Grandfather      Colon cancer Paternal Grandmother Yvrose Mazariegos     Cancer Paternal Grandmother Yvrose Mazariegos         Colon cancer    No Known Problems Paternal Grandfather         Current Medications[1]    Review of Systems:  Constitutional: no fever or chills  Eyes: no visual changes  ENT: no nasal congestion or sore throat  Respiratory: no cough or shortness of breath  Cardiovascular: no chest pain  Gastrointestinal: no nausea or vomiting, tolerating diet  Musculoskeletal: pain, soreness, and decreased ROM    OBJECTIVE:      Vital Signs (Most Recent):  There were no vitals filed for this visit.  There is no height or weight on file to calculate BMI.      Physical Exam:  Constitutional: The patient appears well-developed and well-nourished. No distress.   Skin: No lesions appreciated  Head: Normocephalic and atraumatic.   Nose: Nose normal.   Ears: No deformities seen  Eyes: Conjunctivae and EOM are normal.   Neck: No tracheal deviation present.   Cardiovascular: Normal rate and intact distal pulses.    Pulmonary/Chest: Effort normal. No respiratory distress.   Abdominal: There is no guarding.   Neurological: The patient is alert.   Psychiatric:  The patient has a normal mood and affect.     Left Hand/Wrist Examination:    Observation/Inspection:  Swelling  moderate to the left elbow and distal digits  Deformity  yellowish ecchymosis noted to the lateral edge of the left elbow  Discoloration  none     Scars   none    Atrophy  none    HAND/WRIST EXAMINATION:  He is tender to palpate the radial head of the left elbow  He is tender to palpate the coronoid process of the ulna of the left elbow  He is nontender to palpate the olecranon process, medial epicondyle, and lateral epicondyle    Neurovascular Exam:  Digits WWP, brisk CR < 3s throughout  NVI motor/LTS to M/R/U nerves, radial pulse 2+  Tinel's Test - Carpal Tunnel  Neg  Tinel's Test - Cubital Tunnel  Neg  Phalen's Test    Neg  Median Nerve Compression Test Neg    ROM hand he is able to make a full composite fist with the left hand with notable edema to the distal digits    ROM wrist limited and restricted due to known injury    ROM elbow limited and restricted due to known injury    Abdomen not guarded  Respirations nonlabored  Perfusion intact    Diagnostic Results:     Imaging - I independently viewed the patient's imaging as well as the radiology report.  Xrays of the patient's left elbow x-rays demonstrated displaced fracture fragment along the anterior aspect of the elbow possibly correlated with the coronoid process of the ulna.  There is a possible nondisplaced radial head fracture on x-ray.  There is anterior and posterior fat pad signaling noted.  Soft tissue swelling noted diffusely.  No additional fractures or dislocations noted.      FINDINGS:  Displaced fracture fragment located along the anterior aspect of the elbow.  Donor site may be the coronoid process of the ulna.     No additional fractures.  Small anterior elbow joint effusion.  Soft tissue edema.    EMG - none    ASSESSMENT/PLAN:      44 y.o. yo male with left coronoid process fracture of the ulna with displacement, possible left  radial head nondisplaced fracture, and possible ligament injury    Plan: The patient and I had a thorough discussion today.  We discussed the working diagnosis as well as several other potential alternative diagnoses.  Treatment options were discussed, both conservative and surgical.  Conservative treatment options would include things such as activity modifications, workplace modifications, a period of rest, oral vs topical OTC and prescription anti-inflammatory medications, occupational therapy, splinting/bracing, immobilization, corticosteroid injections, and others.  Surgical options were discussed as well.     At this time, the patient has a left coronoid process fracture with displacement.  We will obtain a CT of the left elbow to evaluate the comminution of this fracture.  We also will obtain a CT to rule out a radial head fracture.  The patient would like to consent for surgery at this time.  A surgical consent was obtained in office for a open reduction internal fixation of the left coronoid process of the ulna, possible ligament repair, possible radial head ORIF, and any indicated procedures with Dr. Euceda at [facility].  Preoperative and postoperative paperwork reviewed in the clinic today.  Preoperative orders signed and held for [facility].  The patient does not require preoperative clearance.  We will place the patient in a left locked hinged brace at 105° of extension.  He is to continue utilizing this brace until surgery or until re-evaluation.  Depending on the patient's CT, we will determine the treatment course.  Refill of oxycodone 5 mg sent to patient's pharmacy per his request.  He will follow up 2 weeks postoperatively with updated elbow x-rays of the left elbow out of splint or sooner for any problems.    Should the patient's symptoms worsen, persist, or fail to improve they should return for reevaluation and I would be happy to see them back anytime.    Please do not hesitate to reach out to us  via email, phone, or MyChart with any questions, concerns, or feedback.    Disclaimer:  This note is prepared using voice recognition software and as such is likely to have errors and has not been proof read. Please contact me for questions.     Lisandra Lazaro PA-C  Orthopedic/Hand Surgery                            [1]   Current Outpatient Medications:     cholecalciferol, vitamin D3, (VITAMIN D3) 50 mcg (2,000 unit) Tab, Take 1 tablet (2,000 Units total) by mouth once daily., Disp: 90 tablet, Rfl: 3    cyanocobalamin, vitamin B-12, 1,000 mcg Cap, Take 1 tablet by mouth Daily. (Patient not taking: Reported on 4/4/2023), Disp: 90 capsule, Rfl: 2    fluticasone (FLONASE) 50 mcg/actuation nasal spray, 1 spray by Each Nare route once daily. (Patient not taking: Reported on 4/4/2023), Disp: 16 g, Rfl: 3    methocarbamoL (ROBAXIN) 500 MG Tab, Take 1 tablet (500 mg total) by mouth 2 (two) times daily as needed (spasm). (Patient not taking: Reported on 4/4/2023), Disp: 20 tablet, Rfl: 0    naproxen (NAPROSYN) 500 MG tablet, Take 1 tablet (500 mg total) by mouth 2 (two) times daily as needed (pain). (Patient not taking: Reported on 4/4/2023), Disp: 30 tablet, Rfl: 2

## 2025-02-25 ENCOUNTER — ANESTHESIA EVENT (OUTPATIENT)
Dept: SURGERY | Facility: HOSPITAL | Age: 45
End: 2025-02-25
Payer: COMMERCIAL

## 2025-02-25 ENCOUNTER — PATIENT MESSAGE (OUTPATIENT)
Dept: ORTHOPEDICS | Facility: CLINIC | Age: 45
End: 2025-02-25
Payer: COMMERCIAL

## 2025-02-25 ENCOUNTER — TELEPHONE (OUTPATIENT)
Dept: ORTHOPEDICS | Facility: CLINIC | Age: 45
End: 2025-02-25
Payer: COMMERCIAL

## 2025-02-25 RX ORDER — ACETAMINOPHEN 500 MG
1000 TABLET ORAL 3 TIMES DAILY
Qty: 20 TABLET | Refills: 0 | Status: SHIPPED | OUTPATIENT
Start: 2025-02-25

## 2025-02-25 RX ORDER — OXYCODONE AND ACETAMINOPHEN 5; 325 MG/1; MG/1
1 TABLET ORAL EVERY 4 HOURS PRN
Qty: 10 TABLET | Refills: 0 | Status: SHIPPED | OUTPATIENT
Start: 2025-02-25

## 2025-02-25 RX ORDER — IBUPROFEN 600 MG/1
600 TABLET ORAL 3 TIMES DAILY
Qty: 20 TABLET | Refills: 0 | Status: SHIPPED | OUTPATIENT
Start: 2025-02-25

## 2025-02-25 NOTE — ANESTHESIA PREPROCEDURE EVALUATION
"02/25/2025    Charlie Mazariegos Jr. is a 44 y.o. male  who presents  for Procedure(s):  ORIF, ELBOW - LEFT CORONOID PROCESS ORIF, POSSIBLE RADIAL HEAD ORIF, POSSIBLE LIGAMENT REPAIR, AND ANY INIDCATED PROCEDURES        Review of patient's allergies indicates:  No Known Allergies    Wt Readings from Last 1 Encounters:   04/04/23 1308 108.5 kg (239 lb 3.2 oz)       BP Readings from Last 3 Encounters:   04/04/23 122/82   01/12/23 138/72   04/16/18 132/80       Problem List[1]    Past Surgical History:   Procedure Laterality Date    CERVICAL FUSION  01/01/2011    C5-C6    FRACTURE SURGERY      Unknown date neck surgery    SHOULDER SURGERY      SPINE SURGERY      Unknown date neck surgery       Social History[2]      Chemistry        Component Value Date/Time     01/18/2023 0730    K 3.7 01/18/2023 0730     01/18/2023 0730    CO2 25 01/18/2023 0730    BUN 9 01/18/2023 0730    CREATININE 1.0 01/18/2023 0730    GLU 85 01/18/2023 0730        Component Value Date/Time    CALCIUM 9.0 01/18/2023 0730    ALKPHOS 77 01/18/2023 0730    AST 21 01/18/2023 0730    ALT 23 01/18/2023 0730    BILITOT 0.8 01/18/2023 0730    ESTGFRAFRICA >60.0 05/31/2014 0834    EGFRNONAA >60.0 05/31/2014 0834            Lab Results   Component Value Date    WBC 4.26 01/18/2023    HGB 15.1 01/18/2023    HCT 42.8 01/18/2023     01/18/2023    CHOL 172 01/18/2023    TRIG 91 01/18/2023    HDL 45 01/18/2023    ALT 23 01/18/2023    AST 21 01/18/2023     01/18/2023    K 3.7 01/18/2023     01/18/2023    CREATININE 1.0 01/18/2023    BUN 9 01/18/2023    CO2 25 01/18/2023    TSH 3.569 01/18/2023    PSA 0.59 01/18/2023    INR 1.1 03/18/2011    HGBA1C 5.2 01/18/2023       No results for input(s): "PT", "INR", "PROTIME", "APTT" in the last 72 hours.    No results found for this or any previous visit.       Pre-op Assessment    I have reviewed the Patient Summary Reports.     I have reviewed the Nursing Notes. I have reviewed the NPO " Status.   I have reviewed the Medications.     Review of Systems  Anesthesia Hx:  No problems with previous Anesthesia   History of prior surgery of interest to airway management or planning: cervical fusion. Previous anesthesia: General, Nerve Block 5/7/14 SLAP lesion repair with general anesthesia.  Procedure performed at an [facility].     for 5/7/14 SLAP lesion repair.  Procedure performed at an [facility].  Airway issues documented on chart review include laryngeal mask airway used, easy direct laryngoscopy, mask, easy , view on direct laryngoscopy Grade I       Social:  Alcohol Use, Non-Smoker       Cardiovascular:     Hypertension                                    Hypertension         Musculoskeletal:     Primary Diagnosis Closed fracture of left elbow, initial encounter  Low back pain with sciatica    S/p fall on ice in [place]             Endocrine:        Obesity / BMI > 30      Physical Exam  General: Well nourished, Cooperative, Alert and Oriented    Airway:  Mallampati: II   Mouth Opening: Normal  TM Distance: Normal  Tongue: Normal  Neck ROM: Normal ROM    Dental:  Intact, Periodontal disease      Past Medical History:   Diagnosis Date    Pre-hypertension      Past Surgical History:   Procedure Laterality Date    CERVICAL FUSION  01/01/2011    C5-C6    FRACTURE SURGERY      Unknown date neck surgery    SHOULDER SURGERY      SPINE SURGERY      Unknown date neck surgery     Anesthesia Assessment: Preoperative EQUATION    Planned Procedure: Procedure(s) (LRB):  ORIF, ELBOW - LEFT CORONOID PROCESS ORIF, POSSIBLE RADIAL HEAD ORIF, POSSIBLE LIGAMENT REPAIR, AND ANY INIDCATED PROCEDURES (Left)  Requested Anesthesia Type:Regional  Surgeon: Marc Euceda MD  Service: Orthopedics  Known or anticipated Date of Surgery:2/26/2025    Surgeon notes: reviewed    Electronic QUestionnaire Assessment completed via nurse interview with patient.      Triage considerations:     The patient has no apparent  "active cardiac condition (No unstable coronary Syndrome such as severe unstable angina or recent [<1 month] myocardial infarction, decompensated CHF, severe valvular   disease or significant arrhythmia)    Previous anesthesia records:GETA, Nerve block for post-op pain, and No problems    Last PCP note: > 1 year ago , within [facility]   Subspecialty notes: Ortho    Other important co-morbidities: HTN and Obesity   No EKG/Stress test/Echo noted on chart.     Tests already available:  Results have been reviewed.             Instructions given. (See in Nurse's note) Pre op medication instructions sent via portal message.     Optimization:  Anesthesia Preop Clinic Assessment not Indicated    Medical Opinion Indicated: no       Sub-specialist consult indicated: no       Plan:   No pre op medical clearance requested.    Navigation:  Straight Line to surgery.               No tests, anesthesia preop clinic visit, or consult required.    Ht: 6'2"  Wt: 108.5 kg (239 lb)  BMI: 30.71    Anesthesia Plan  Type of Anesthesia, risks & benefits discussed:    Anesthesia Type: Gen ETT, Gen Supraglottic Airway, Gen Natural Airway, Regional  Intra-op Monitoring Plan: Standard ASA Monitors  Post Op Pain Control Plan: multimodal analgesia, peripheral nerve block and IV/PO Opioids PRN  Induction:  IV  Informed Consent: Informed consent signed with the Patient and all parties understand the risks and agree with anesthesia plan.  All questions answered.   ASA Score: 2  Day of Surgery Review of History & Physical: H&P Update referred to the surgeon/provider.  Anesthesia Plan Notes: Preoperative evaluation completed by chart review, updated DOS after patient evaluation and physical examination.    Discussed: general anesthesia with native airway vs supraglottic airway vs ett, regional block with catheter placement for postoperative pain control. Discussed risks associated with regional block to include but not be limited to: bleeding, infection, " or even nerve injury. Discussed risks associated with general anesthesia to include but not be limited to: dental or lip injury, post operative nausea and vomiting, cardiac arrest, stroke, or even death.     Patient voiced understanding and elects to proceed.       Ready For Surgery From Anesthesia Perspective.     .         [1]   Patient Active Problem List  Diagnosis    Tear of right glenoid labrum    Instability of left shoulder joint    S/P R shoulder surgery, arthroscopic anterior/inferior labrum repair, inferior capsulorrhaphy    Shoulder stiffness    Shoulder pain    Essential hypertension    Diastolic blood pressure 80 to 89 mm Hg    Vitamin D deficiency   [2]   Social History  Socioeconomic History    Marital status: Single   Tobacco Use    Smoking status: Never    Smokeless tobacco: Never   Substance and Sexual Activity    Alcohol use: Yes     Alcohol/week: 3.0 standard drinks of alcohol     Types: 3 Cans of beer per week     Comment: Occasionally drink on weekends    Drug use: No    Sexual activity: Yes     Partners: Female     Birth control/protection: Condom

## 2025-02-25 NOTE — ANESTHESIA PAT ROS NOTE
02/25/2025  Charlie Mazariegos Jr. is a 44 y.o., male.      Pre-op Assessment    I have reviewed the Patient Summary Reports.       I have reviewed the Medications.     Review of Systems  Anesthesia Hx:  No problems with previous Anesthesia   History of prior surgery of interest to airway management or planning: cervical fusion. Previous anesthesia: General, Nerve Block 5/7/14 SLAP lesion repair with general anesthesia.  Procedure performed at an [facility].     for 5/7/14 SLAP lesion repair.  Procedure performed at an [facility].  Airway issues documented on chart review include laryngeal mask airway used, easy direct laryngoscopy, mask, easy , view on direct laryngoscopy Grade I       Social:  Alcohol Use, Non-Smoker       Cardiovascular:     Hypertension                                          Musculoskeletal:     Primary Diagnosis Closed fracture of left elbow, initial encounter  Low back pain with sciatica            Endocrine:        Obesity / BMI > 30       Past Medical History:   Diagnosis Date    Pre-hypertension      Past Surgical History:   Procedure Laterality Date    CERVICAL FUSION  01/01/2011    C5-C6    FRACTURE SURGERY      Unknown date neck surgery    SHOULDER SURGERY      SPINE SURGERY      Unknown date neck surgery     Anesthesia Assessment: Preoperative EQUATION    Planned Procedure: Procedure(s) (LRB):  ORIF, ELBOW - LEFT CORONOID PROCESS ORIF, POSSIBLE RADIAL HEAD ORIF, POSSIBLE LIGAMENT REPAIR, AND ANY INIDCATED PROCEDURES (Left)  Requested Anesthesia Type:Regional  Surgeon: Marc Euceda MD  Service: Orthopedics  Known or anticipated Date of Surgery:2/26/2025    Surgeon notes: reviewed    Electronic QUestionnaire Assessment completed via nurse interview with patient.      Triage considerations:     The patient has no apparent active cardiac condition (No unstable  "coronary Syndrome such as severe unstable angina or recent [<1 month] myocardial infarction, decompensated CHF, severe valvular   disease or significant arrhythmia)    Previous anesthesia records:GETA, Nerve block for post-op pain, and No problems    Last PCP note: > 1 year ago , within [facility]   Subspecialty notes: Ortho    Other important co-morbidities: HTN and Obesity   No EKG/Stress test/Echo noted on chart.     Tests already available:  Results have been reviewed.             Instructions given. (See in Nurse's note) Pre op medication instructions sent via portal message.     Optimization:  Anesthesia Preop Clinic Assessment not Indicated    Medical Opinion Indicated: no       Sub-specialist consult indicated: no       Plan:   No pre op medical clearance requested.    Navigation:  Straight Line to surgery.               No tests, anesthesia preop clinic visit, or consult required.    Ht: 6'2"  Wt: 108.5 kg (239 lb)  BMI: 30.71  "

## 2025-02-26 ENCOUNTER — HOSPITAL ENCOUNTER (OUTPATIENT)
Facility: HOSPITAL | Age: 45
Discharge: HOME | End: 2025-02-26
Attending: ORTHOPAEDIC SURGERY | Admitting: ORTHOPAEDIC SURGERY
Payer: COMMERCIAL

## 2025-02-26 ENCOUNTER — ANESTHESIA (OUTPATIENT)
Dept: SURGERY | Facility: HOSPITAL | Age: 45
End: 2025-02-26
Payer: COMMERCIAL

## 2025-02-26 VITALS
DIASTOLIC BLOOD PRESSURE: 75 MMHG | HEART RATE: 76 BPM | TEMPERATURE: 98 F | BODY MASS INDEX: 31.44 KG/M2 | RESPIRATION RATE: 11 BRPM | WEIGHT: 245 LBS | HEIGHT: 74 IN | OXYGEN SATURATION: 95 % | SYSTOLIC BLOOD PRESSURE: 129 MMHG

## 2025-02-26 DIAGNOSIS — S42.402A CLOSED FRACTURE OF LEFT ELBOW, INITIAL ENCOUNTER: ICD-10-CM

## 2025-02-26 DIAGNOSIS — S52.042A CLOSED DISPLACED FRACTURE OF CORONOID PROCESS OF LEFT ULNA, INITIAL ENCOUNTER: Primary | ICD-10-CM

## 2025-02-26 PROCEDURE — 24685 OPTX ULNAR FX PROX END W/FIX: CPT | Mod: 51,LT,, | Performed by: ORTHOPAEDIC SURGERY

## 2025-02-26 PROCEDURE — 37000008 HC ANESTHESIA 1ST 15 MINUTES: Performed by: ORTHOPAEDIC SURGERY

## 2025-02-26 PROCEDURE — 63600175 PHARM REV CODE 636 W HCPCS: Performed by: NURSE ANESTHETIST, CERTIFIED REGISTERED

## 2025-02-26 PROCEDURE — 36000710: Performed by: ORTHOPAEDIC SURGERY

## 2025-02-26 PROCEDURE — 64415 NJX AA&/STRD BRCH PLXS IMG: CPT | Performed by: ANESTHESIOLOGY

## 2025-02-26 PROCEDURE — 63600175 PHARM REV CODE 636 W HCPCS: Performed by: STUDENT IN AN ORGANIZED HEALTH CARE EDUCATION/TRAINING PROGRAM

## 2025-02-26 PROCEDURE — 25000003 PHARM REV CODE 250: Performed by: NURSE ANESTHETIST, CERTIFIED REGISTERED

## 2025-02-26 PROCEDURE — 27201423 OPTIME MED/SURG SUP & DEVICES STERILE SUPPLY: Performed by: ORTHOPAEDIC SURGERY

## 2025-02-26 PROCEDURE — C1713 ANCHOR/SCREW BN/BN,TIS/BN: HCPCS | Performed by: ORTHOPAEDIC SURGERY

## 2025-02-26 PROCEDURE — 71000015 HC POSTOP RECOV 1ST HR: Performed by: ORTHOPAEDIC SURGERY

## 2025-02-26 PROCEDURE — 36000711: Performed by: ORTHOPAEDIC SURGERY

## 2025-02-26 PROCEDURE — 37000009 HC ANESTHESIA EA ADD 15 MINS: Performed by: ORTHOPAEDIC SURGERY

## 2025-02-26 PROCEDURE — 24341 RPR TDN/MUSC UPR A/E EACH: CPT | Mod: LT,,, | Performed by: ORTHOPAEDIC SURGERY

## 2025-02-26 PROCEDURE — 99900035 HC TECH TIME PER 15 MIN (STAT)

## 2025-02-26 PROCEDURE — D9220A PRA ANESTHESIA: Mod: CRNA,,, | Performed by: NURSE ANESTHETIST, CERTIFIED REGISTERED

## 2025-02-26 PROCEDURE — 27200651 HC AIRWAY, LMA: Performed by: STUDENT IN AN ORGANIZED HEALTH CARE EDUCATION/TRAINING PROGRAM

## 2025-02-26 PROCEDURE — 24345 REPR ELBW MED LIGMNT W/TISSU: CPT | Mod: 51,LT,, | Performed by: ORTHOPAEDIC SURGERY

## 2025-02-26 PROCEDURE — 63600175 PHARM REV CODE 636 W HCPCS: Performed by: NURSE PRACTITIONER

## 2025-02-26 PROCEDURE — 25000003 PHARM REV CODE 250: Performed by: NURSE PRACTITIONER

## 2025-02-26 PROCEDURE — 94761 N-INVAS EAR/PLS OXIMETRY MLT: CPT

## 2025-02-26 PROCEDURE — D9220A PRA ANESTHESIA: Mod: ANES,,, | Performed by: ANESTHESIOLOGY

## 2025-02-26 PROCEDURE — 25000003 PHARM REV CODE 250

## 2025-02-26 PROCEDURE — C1769 GUIDE WIRE: HCPCS | Performed by: ORTHOPAEDIC SURGERY

## 2025-02-26 PROCEDURE — 63600175 PHARM REV CODE 636 W HCPCS: Performed by: ANESTHESIOLOGY

## 2025-02-26 PROCEDURE — 71000033 HC RECOVERY, INTIAL HOUR: Performed by: ORTHOPAEDIC SURGERY

## 2025-02-26 DEVICE — TRUSHOT WITH Y-KNOT 1.8 MM, ALL-SUTURE ANCHOR WITH TWO #2 (5 METRIC) HI-FI® SUTURES WITH NEEDLES
Type: IMPLANTABLE DEVICE | Site: ELBOW | Status: FUNCTIONAL
Brand: TRUSHOT

## 2025-02-26 DEVICE — IMPLANTABLE DEVICE: Type: IMPLANTABLE DEVICE | Site: ELBOW | Status: FUNCTIONAL

## 2025-02-26 RX ORDER — MIDAZOLAM HYDROCHLORIDE 1 MG/ML
0.5 INJECTION, SOLUTION INTRAMUSCULAR; INTRAVENOUS
Status: DISCONTINUED | OUTPATIENT
Start: 2025-02-26 | End: 2025-02-26 | Stop reason: HOSPADM

## 2025-02-26 RX ORDER — HALOPERIDOL 5 MG/ML
0.5 INJECTION INTRAMUSCULAR EVERY 10 MIN PRN
Status: DISCONTINUED | OUTPATIENT
Start: 2025-02-26 | End: 2025-02-26 | Stop reason: HOSPADM

## 2025-02-26 RX ORDER — OXYCODONE HYDROCHLORIDE 5 MG/1
5 CAPSULE ORAL EVERY 4 HOURS PRN
COMMUNITY

## 2025-02-26 RX ORDER — CELECOXIB 200 MG/1
400 CAPSULE ORAL
Status: COMPLETED | OUTPATIENT
Start: 2025-02-26 | End: 2025-02-26

## 2025-02-26 RX ORDER — MUPIROCIN 20 MG/G
OINTMENT TOPICAL 2 TIMES DAILY
Status: DISCONTINUED | OUTPATIENT
Start: 2025-02-26 | End: 2025-02-26 | Stop reason: HOSPADM

## 2025-02-26 RX ORDER — LIDOCAINE HYDROCHLORIDE 20 MG/ML
INJECTION INTRAVENOUS
Status: DISCONTINUED | OUTPATIENT
Start: 2025-02-26 | End: 2025-02-26

## 2025-02-26 RX ORDER — FENTANYL CITRATE 50 UG/ML
25 INJECTION, SOLUTION INTRAMUSCULAR; INTRAVENOUS EVERY 5 MIN PRN
Refills: 0 | Status: DISCONTINUED | OUTPATIENT
Start: 2025-02-26 | End: 2025-02-26 | Stop reason: HOSPADM

## 2025-02-26 RX ORDER — MUPIROCIN 20 MG/G
OINTMENT TOPICAL
Status: DISCONTINUED | OUTPATIENT
Start: 2025-02-26 | End: 2025-02-26 | Stop reason: HOSPADM

## 2025-02-26 RX ORDER — ROPIVACAINE HYDROCHLORIDE 5 MG/ML
INJECTION, SOLUTION EPIDURAL; INFILTRATION; PERINEURAL
Status: COMPLETED | OUTPATIENT
Start: 2025-02-26 | End: 2025-02-26

## 2025-02-26 RX ORDER — FENTANYL CITRATE 50 UG/ML
25 INJECTION, SOLUTION INTRAMUSCULAR; INTRAVENOUS EVERY 5 MIN PRN
Status: DISCONTINUED | OUTPATIENT
Start: 2025-02-26 | End: 2025-02-26 | Stop reason: HOSPADM

## 2025-02-26 RX ORDER — DEXMEDETOMIDINE HYDROCHLORIDE 100 UG/ML
INJECTION, SOLUTION INTRAVENOUS
Status: DISCONTINUED | OUTPATIENT
Start: 2025-02-26 | End: 2025-02-26

## 2025-02-26 RX ORDER — DEXAMETHASONE SODIUM PHOSPHATE 4 MG/ML
INJECTION, SOLUTION INTRA-ARTICULAR; INTRALESIONAL; INTRAMUSCULAR; INTRAVENOUS; SOFT TISSUE
Status: DISCONTINUED | OUTPATIENT
Start: 2025-02-26 | End: 2025-02-26

## 2025-02-26 RX ORDER — SODIUM CHLORIDE 9 MG/ML
INJECTION, SOLUTION INTRAVENOUS CONTINUOUS
Status: DISCONTINUED | OUTPATIENT
Start: 2025-02-26 | End: 2025-02-26 | Stop reason: HOSPADM

## 2025-02-26 RX ORDER — ACETAMINOPHEN 500 MG
1000 TABLET ORAL
Status: COMPLETED | OUTPATIENT
Start: 2025-02-26 | End: 2025-02-26

## 2025-02-26 RX ORDER — FENTANYL CITRATE 50 UG/ML
25 INJECTION, SOLUTION INTRAMUSCULAR; INTRAVENOUS EVERY 5 MIN PRN
Status: DISCONTINUED | OUTPATIENT
Start: 2025-02-27 | End: 2025-02-26

## 2025-02-26 RX ORDER — CEFAZOLIN SODIUM 1 G/3ML
INJECTION, POWDER, FOR SOLUTION INTRAMUSCULAR; INTRAVENOUS
Status: DISCONTINUED | OUTPATIENT
Start: 2025-02-26 | End: 2025-02-26

## 2025-02-26 RX ORDER — OXYCODONE HYDROCHLORIDE 5 MG/1
5 TABLET ORAL
Status: DISCONTINUED | OUTPATIENT
Start: 2025-02-26 | End: 2025-02-26 | Stop reason: HOSPADM

## 2025-02-26 RX ORDER — LIDOCAINE HYDROCHLORIDE 20 MG/ML
INJECTION, SOLUTION EPIDURAL; INFILTRATION; INTRACAUDAL; PERINEURAL
Status: DISCONTINUED
Start: 2025-02-26 | End: 2025-02-26 | Stop reason: HOSPADM

## 2025-02-26 RX ORDER — ONDANSETRON HYDROCHLORIDE 2 MG/ML
INJECTION, SOLUTION INTRAVENOUS
Status: DISCONTINUED | OUTPATIENT
Start: 2025-02-26 | End: 2025-02-26

## 2025-02-26 RX ORDER — ROPIVACAINE HYDROCHLORIDE 5 MG/ML
INJECTION, SOLUTION EPIDURAL; INFILTRATION; PERINEURAL
Status: COMPLETED
Start: 2025-02-26 | End: 2025-02-26

## 2025-02-26 RX ORDER — SODIUM CHLORIDE 0.9 % (FLUSH) 0.9 %
10 SYRINGE (ML) INJECTION
Status: DISCONTINUED | OUTPATIENT
Start: 2025-02-26 | End: 2025-02-26 | Stop reason: HOSPADM

## 2025-02-26 RX ORDER — PROPOFOL 10 MG/ML
VIAL (ML) INTRAVENOUS CONTINUOUS PRN
Status: DISCONTINUED | OUTPATIENT
Start: 2025-02-26 | End: 2025-02-26

## 2025-02-26 RX ORDER — PROPOFOL 10 MG/ML
VIAL (ML) INTRAVENOUS
Status: DISCONTINUED | OUTPATIENT
Start: 2025-02-26 | End: 2025-02-26

## 2025-02-26 RX ORDER — CEFAZOLIN 2 G/1
2 INJECTION, POWDER, FOR SOLUTION INTRAMUSCULAR; INTRAVENOUS
Status: DISCONTINUED | OUTPATIENT
Start: 2025-02-26 | End: 2025-02-26 | Stop reason: HOSPADM

## 2025-02-26 RX ORDER — FAMOTIDINE 10 MG/ML
INJECTION, SOLUTION INTRAVENOUS
Status: DISCONTINUED | OUTPATIENT
Start: 2025-02-26 | End: 2025-02-26

## 2025-02-26 RX ADMIN — DEXMEDETOMIDINE 10 MCG: 100 INJECTION, SOLUTION, CONCENTRATE INTRAVENOUS at 02:02

## 2025-02-26 RX ADMIN — DEXAMETHASONE SODIUM PHOSPHATE 4 MG: 4 INJECTION, SOLUTION INTRAMUSCULAR; INTRAVENOUS at 02:02

## 2025-02-26 RX ADMIN — PROPOFOL 100 MCG/KG/MIN: 10 INJECTION, EMULSION INTRAVENOUS at 02:02

## 2025-02-26 RX ADMIN — FENTANYL CITRATE 100 MCG: 50 INJECTION INTRAMUSCULAR; INTRAVENOUS at 12:02

## 2025-02-26 RX ADMIN — ACETAMINOPHEN 1000 MG: 500 TABLET ORAL at 08:02

## 2025-02-26 RX ADMIN — PROPOFOL 200 MG: 10 INJECTION, EMULSION INTRAVENOUS at 02:02

## 2025-02-26 RX ADMIN — ONDANSETRON 4 MG: 2 INJECTION INTRAMUSCULAR; INTRAVENOUS at 02:02

## 2025-02-26 RX ADMIN — SODIUM CHLORIDE: 0.9 INJECTION, SOLUTION INTRAVENOUS at 08:02

## 2025-02-26 RX ADMIN — PROPOFOL 30 MG: 10 INJECTION, EMULSION INTRAVENOUS at 02:02

## 2025-02-26 RX ADMIN — CELECOXIB 400 MG: 200 CAPSULE ORAL at 08:02

## 2025-02-26 RX ADMIN — CEFAZOLIN 2 G: 330 INJECTION, POWDER, FOR SOLUTION INTRAMUSCULAR; INTRAVENOUS at 02:02

## 2025-02-26 RX ADMIN — MUPIROCIN: 20 OINTMENT TOPICAL at 08:02

## 2025-02-26 RX ADMIN — MIDAZOLAM 2 MG: 1 INJECTION INTRAMUSCULAR; INTRAVENOUS at 12:02

## 2025-02-26 RX ADMIN — SODIUM CHLORIDE: 0.9 INJECTION, SOLUTION INTRAVENOUS at 02:02

## 2025-02-26 RX ADMIN — FAMOTIDINE 20 MG: 10 INJECTION, SOLUTION INTRAVENOUS at 02:02

## 2025-02-26 RX ADMIN — SODIUM CHLORIDE, SODIUM GLUCONATE, SODIUM ACETATE, POTASSIUM CHLORIDE, MAGNESIUM CHLORIDE, SODIUM PHOSPHATE, DIBASIC, AND POTASSIUM PHOSPHATE: .53; .5; .37; .037; .03; .012; .00082 INJECTION, SOLUTION INTRAVENOUS at 03:02

## 2025-02-26 RX ADMIN — ROPIVACAINE HYDROCHLORIDE 25 ML: 5 INJECTION EPIDURAL; INFILTRATION; PERINEURAL at 12:02

## 2025-02-26 RX ADMIN — LIDOCAINE HYDROCHLORIDE 50 MG: 20 INJECTION INTRAVENOUS at 02:02

## 2025-02-26 RX ADMIN — PROPOFOL 50 MG: 10 INJECTION, EMULSION INTRAVENOUS at 02:02

## 2025-02-26 NOTE — ANESTHESIA PROCEDURE NOTES
Supraclavicular Single Shot Nerve Block    Patient location during procedure: pre-op   Block not for primary anesthetic.  Reason for block: at surgeon's request and post-op pain management   Post-op Pain Location: L elbow pain   Start time: 2/26/2025 12:09 PM  Timeout: 2/26/2025 12:08 PM   End time: 2/26/2025 12:10 PM    Staffing  Authorizing Provider: José Miguel Bright MD  Performing Provider: José Miguel Bright MD    Staffing  Performed by: José Miguel Bright MD  Authorized by: José Miguel Bright MD    Preanesthetic Checklist  Completed: patient identified, IV checked, site marked, risks and benefits discussed, surgical consent, monitors and equipment checked, pre-op evaluation and timeout performed  Peripheral Block  Patient position: supine  Prep: ChloraPrep  Patient monitoring: heart rate, cardiac monitor, continuous pulse ox, continuous capnometry and frequent blood pressure checks  Block type: supraclavicular  Laterality: left  Injection technique: single shot  Needle  Needle type: Stimuplex   Needle gauge: 22 G  Needle length: 2 in  Needle localization: anatomical landmarks and ultrasound guidance   -ultrasound image captured on disc.  Assessment  Injection assessment: negative aspiration, negative parasthesia and local visualized surrounding nerve  Paresthesia pain: none  Heart rate change: no  Slow fractionated injection: yes  Pain Tolerance: comfortable throughout block and no complaints  Medications:    Medications: ropivacaine (NAROPIN) injection 0.5% - Perineural   25 mL - 2/26/2025 12:10:00 PM    Additional Notes  VSS.  [facility] RN monitoring vitals throughout procedure.  Patient tolerated procedure well.

## 2025-02-26 NOTE — ANESTHESIA PROCEDURE NOTES
Intubation    Date/Time: 2/26/2025 2:43 PM    Performed by: Mary Avery CRNA  Authorized by: Susi Edward MD    Intubation:     Induction:  Intravenous    Intubated:  Postinduction    Mask Ventilation:  Not attempted    Attempts:  1    Attempted By:  CRNA    Difficult Airway Encountered?: No      Complications:  None    Airway Device:  Supraglottic airway/LMA    Airway Device Size:  5.0    Style/Cuff Inflation:  Cuffed (inflated to minimal occlusive pressure)    Secured at:  The lips    Placement Verified By:  Capnometry    Complicating Factors:  None    Findings Post-Intubation:  BS equal bilateral and atraumatic/condition of teeth unchanged

## 2025-02-26 NOTE — PLAN OF CARE
Pre op complete. Waiting on anesthesia consent, site gustavo and update. Nobody here with patient currently; will lock belongings in locker. Pt resting comfortably with all questions addressed at this time and call light in reach.

## 2025-02-26 NOTE — TRANSFER OF CARE
"Anesthesia Transfer of Care Note    Patient: Charlie Mazariegos Jr.    Procedure(s) Performed: Procedure(s) (LRB):  ORIF, ELBOW - LEFT CORONOID PROCESS ORIF, LIGAMENT REPAIR, AND ANY INIDCATED PROCEDURES (Left)    Patient location: PACU    Anesthesia Type: general    Transport from OR: Transported from OR on 100% O2 by closed face mask with adequate spontaneous ventilation    Post pain: adequate analgesia    Post assessment: no apparent anesthetic complications and tolerated procedure well    Post vital signs: stable    Level of consciousness: sedated and responds to stimulation    Nausea/Vomiting: no nausea/vomiting    Complications: none    Transfer of care protocol was followed    Last vitals: Visit Vitals  /70 (BP Location: Right arm, Patient Position: Lying)   Pulse (!) 57   Temp 36.5 °C (97.7 °F) (Temporal)   Resp 10   Ht 6' 2" (1.88 m)   Wt 111.1 kg (245 lb)   SpO2 99%   BMI 31.46 kg/m²     "

## 2025-02-26 NOTE — BRIEF OP NOTE
[facility] - Surgery (Hospital)  Brief Operative Note    Surgery Date: 2/26/2025     Surgeons and Role:     * Marc Euceda MD - Primary     * OSWALD Munoz MD - Assisting        Pre-op Diagnosis:  Closed fracture of left elbow, initial encounter [S42.402A]    Post-op Diagnosis:  Post-Op Diagnosis Codes:     * Closed fracture of left elbow, initial encounter [S42.402A]    Procedure(s) (LRB):  ORIF, ELBOW - LEFT CORONOID PROCESS ORIF, LIGAMENT REPAIR, AND ANY INIDCATED PROCEDURES (Left)    Anesthesia: Regional    Operative Findings: see op note    Estimated Blood Loss: * No values recorded between 2/26/2025  2:35 PM and 2/26/2025  4:46 PM *         Specimens:   Specimen (24h ago, onward)      None              Discharge Note    OUTCOME: Patient tolerated treatment/procedure well without complication and is now ready for discharge.    DISPOSITION: Home or Self Care    FINAL DIAGNOSIS:  <principal problem not specified>    FOLLOWUP: In clinic    DISCHARGE INSTRUCTIONS:    Discharge Procedure Orders   Diet general     Call MD for:  temperature >100.4     Call MD for:  persistent nausea and vomiting     Call MD for:  severe uncontrolled pain     Call MD for:  difficulty breathing, headache or visual disturbances     Call MD for:  redness, tenderness, or signs of infection (pain, swelling, redness, odor or green/yellow discharge around incision site)     Call MD for:  hives     Call MD for:  persistent dizziness or light-headedness     Call MD for:  extreme fatigue     Lifting restrictions     Leave dressing on - Keep it clean, dry, and intact until clinic visit

## 2025-02-26 NOTE — PLAN OF CARE
Patient is AAO and VSS.  Tolerating PO and states pain is tolerable.  Dressing CDI.  Patient states they are ready for d/c.  IV removed.  Catheter tip intact.  Brother at bedside.  Discharge instructions reviewed and copy given to the patient and brother.  Questions answered.  Both verbalized understanding.  Medication delivered to bedside.  Patient wheeled to car by Mehreen DURAN.

## 2025-02-28 ENCOUNTER — TELEPHONE (OUTPATIENT)
Dept: ORTHOPEDICS | Facility: CLINIC | Age: 45
End: 2025-02-28
Payer: COMMERCIAL

## 2025-02-28 NOTE — TELEPHONE ENCOUNTER
----- Message from Marycruz sent at 2/28/2025 10:57 AM CST -----  Pt Requesting Call BackWhstalin called: Phoebe called for pt:Best call back #:  [phone] notes: pt said he is having numbness in fingers on the same arm he had his elbow surgery done on; pt said he wants to know from the nurse is this normal or not

## 2025-03-03 ENCOUNTER — TELEPHONE (OUTPATIENT)
Dept: ORTHOPEDICS | Facility: CLINIC | Age: 45
End: 2025-03-03
Payer: COMMERCIAL

## 2025-03-03 NOTE — TELEPHONE ENCOUNTER
Called and spoke with patient.  He notes that he is doing well with essentially no pain at rest at this time.  Pain has been alternating between 0 and 4 since surgery and it has been well managed mostly with NSAIDs.  He does note that within the day or 2 after surgery he had some numbness in the left hand which has essentially resolved or dramatically improved other than some mild persistent numbness to the pinky finger.  I informed him that this is not unexpected given his ulnar nerve decompression.  Offered to have him come into clinic for splint change in evaluation versus continue follow up as scheduled.  He would like to follow up as scheduled which I feel is reasonable.  He was advised to continue range motion of his fingers as tolerated and without restrictions to prevent stiffness.  He will follow up as scheduled.  All questions answered and we will remain available for any further needs.

## 2025-03-05 NOTE — OP NOTE
DATE OF PROCEDURE: 2/26/2025     COVID-19 attestation:  Due to the nature of this patient's condition and/or the presence of pain, debilitty, and/or dysfunction, proceeding with surgery was indicated.  Furthermore, this patient was treated during the COVID-19 pandemic.  This was discussed with the patient, they are aware of our current policies and procedures, were given the option of delaying their surgery when applicable and if acceptable, and they elect to proceed.  Delaying this patient's surgery greater than 30 days would be detrimental to their care and functional outcome and/or cause additional suffering, pain, discomfort, and/or dysfunction/debility.  This was confirmed and we thus proceeded.      SERVICE:  Orthopedic Surgery.     SURGEON:  Marc Euceda M.D.     FIRST ASSISTANT:  MD KARISSA Ac     PREOPERATIVE DIAGNOSIS:    Left elbow dislocation status post closed reduction  Left coronoid fracture  Left elbow ulnar collateral ligament tear     POSTOPERATIVE DIAGNOSIS:    Left elbow dislocation status post closed reduction  Left coronoid fracture  Left elbow ulnar collateral ligament tear  Loose bodies left elbow  Torn flexor pronator mass myotendinous units left elbow at the origin from the medial epicondyle     PROCEDURE(S) PERFORMED:    Open reduction internal fixation intra-articular left elbow fracture with coronoid fracture ORIF  Primary repair left elbow ulnar collateral ligament, CPT code 91867  Loose body removal left elbow, multiple CPT code 37499  Ulnar nerve decompression and extensive external neurolysis left elbow, CPT code 93434  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, CPT code 88052     TOURNIQUET TIME:  2 hours and 3 minutes at 250mmHg.     ESTIMATED BLOOD LOSS:  10 mL.     IMPLANTS:  Accu Med AcuTrak 3 micro headless compression screws x2 as well as a con Med true shot all suture suture anchor for MCL ligament repair     COMPLICATIONS:  None.     PACKS AND DRAINS:   None.     PATHOLOGIC SPECIMENS:  None.    ANESTHESIA:  Regional     IV FLUIDS: Crystalloid.     CONDITION:  Stable.    MICROBIOLOGY: None.    Indications for Procedure:     The patient is a 44-year-old male who previously sustained a left elbow dislocation.  He underwent closed reduction at an outside institution and followed up in the orthopedic clinic for definitive care.  He was found to have a displaced olecranon fracture and the patient wished to proceed with surgery.  The patient has not responded to adequate non operative treatment at this time and/or non operative treatment is not indicated. Thus, the risks, benefits and alternatives to surgery were discussed with the patient in detail.  Specific risks include but are not limited to bleeding, infection, vessel and/or nerve damage, pain, numbness, tingling, compartment syndrome, need for additional surgery, failure to return to pre-injury and/or preoperative functional status, inability to return to work, scar sensitivity, delayed healing, complex regional pain syndrome, weakness, pulley injury, tendon injury, bowstringing, partial and/or incomplete relief of symptoms, persistence of and/or worsening of symptoms, hardware and/or surgical failure, prominent and/or symptomatic hardware possibly necessitating future removal, osteomyelitis, amputation, loss of function, stiffness, rotational malalignment, functional debility, dysfunction, decreased  strength, need for prolonged postoperative rehabilitation, malunion, nonunion, deep venous thrombosis, pulmonary embolism, arthritis and death.  The patient states an understanding and wishes to proceed with surgery.   All questions were answered.  No guarantees were implied or stated.  Written informed consent was obtained.     Procedure in detail:    On the date of the operative intervention, the patient was evaluated in the preoperative holding area.  With their participation the left upper extremity was  marked at the operative site.   The patient was then administered regional anesthesia and taken to the operating room where they were placed on OR table.  The left upper extremity extremity was then placed on a hand table with a nonsterile tourniquet placed high on the patient's left upper extremity.  The left upper extremity extremity was then prepped and draped in the usual sterile fashion and time-out was taken and confirmed by all present to confirm the correct patient site procedure and administration of preoperative antibiotics if ordered.  All were in agreement so I proceeded.    Esmarch was utilized to exsanguinate the left upper extremity and tourniquet was insufflated to 250 mm of mercury.  An a proximally 12 cm incision was marked out over the medial aspect of the patient's left elbow.  Skin incision was made sharply with a scalpel and dissection was carried down through skin and subcutaneous tissues.  Branches of the medial antebrachial cutaneous nerve were identified retracted and protected throughout the duration of the procedure.  The ulnar nerve was identified in the more proximal aspect of the incision.  It was decompressed throughout the length of the cubital tunnel and beyond the length of the skin incision both proximally and distally for a complete decompression and evaluation.  I traced the ulnar nerve more distally into the cubital tunnel and found it to be significantly contused under Jo's ligament.  Thus, decision was made to proceed with complete ulnar nerve decompression as well as an external neurolysis.  Having completed my ulnar nerve decompression for a cubital tunnel release as well as an external neurolysis of the ulnar nerve I then continued my dissection more deeply.  The ulnar nerve was retracted and protected throughout the duration of the procedure.  With the ulnar nerve retracted, it was evident that there was a traumatic arthrotomy extending through the flexor pronator  mass with significant disruption of the soft tissues.  This interval appeared to be through the common extensor tendon.  I developed this traumatic disruption to gain additional access into the medial aspect of the elbow joint.  Deeper dissection revealed that there was a complete avulsion of the ulnar collateral ligament from the medial epicondyle.  The ligament substance itself was intact and suitable for later primary repair.  I then released additional flexor pronator tissue from the supracondylar ridge to gain access to the coronoid fracture.  Upon doing so, I had excellent access into the medial aspect of the elbow joint with great visualization of the coronoid fracture as well as extending into the lateral compartment of the elbow.  Now with my visualization, I identified several loose bodies within the elbow consisting of cartilaginous shearing chondral loose bodies.  These were retrieved for loose body removal.  Having completed my loose body removal I then continued focusing on reduction and fixation of the coronoid fracture fragment.  I irrigated the elbow and removed significant fracture hematoma and traumatic debris.  At this point in time, I was able to reduce the coronoid fracture fragment down to its anatomic footprint in his then restore anatomic articular congruity to the proximal ulna.  I was very pleased with the reduction.  A small portion of capsule was released from the coronoid fracture fragment to facilitate fixation.  K-wires were placed through the coronoid fracture fragment and into the proximal ulna for provisional fixation and stabilization.  These were checked under fluoroscopic guidance and I was very pleased with both the reduction as well as placement of these guidewires.  Next, I over drilled the guidewires and then measured and placed 2 [employer] AcuTrak 3 micro headless compression screws.  I took care to place these screws and positioned them subchondrally and they had excellent  bite and restored excellent stability of the coronoid fracture fragment.  After the screws were measured and placed under fluoroscopic guidance I then took final radiographs which verified excellent maintenance of the reduction as well as good placement of the hardware which was all completely extra-articular.  I had direct visualization of the entire ulnar articular surface and there was no hardware breach of the articular surface and the articular surface was smooth and anatomically reduced with no step-off.  Having completed my open reduction and internal fixation of the intra-articular left elbow coronoid fracture, I then once again irrigated the wound with copious amounts of sterile saline.  I identify the avulsed ulnar collateral ligament and prepared for a repair of this structure.  A con Med all suture suture anchor was placed into the medial epicondyle and then the suture tails were crack out up and down the avulsed ulnar collateral ligament and primarily repaired down to its footprint.  This had excellent positioning and restored the stability to the elbow.  The elbow was then taken through full range motion and was completely stable.  There was no crepitus.  Pronation and supination of the forearm demonstrated smooth motion at the PIPJ.  I then irrigated the wound once again with copious amounts of sterile saline and closed the wound in a layered fashion.  I 1st utilized the remaining braided suture from the suture anchor to repair the flexor pronator mass at the site of its traumatic rupture.  This repair was excellent.  I then returned my attention to the ulnar nerve.  I left this in-situ and it was found to be fully intact throughout its course and well decompressed.  It again appeared somewhat contused from the trauma to the elbow but otherwise was in good positioning and intact.  The wound was then once again closed in layered fashion with 4-0 Vicryl suture and skin was closed with staples.  Sterile  dressings were applied including a long-arm splint to left upper extremity.  The tourniquet was deflated and brisk capillary refill in [place] throughout the entire left upper extremity.  The patient was awakened from anesthesia and returned to the post anesthesia care unit stable condition.  There were no complications as attending surgeon I was present and performed the critical portions of the procedure.    Postop plan for the patient: The patient will be discharged home in stable condition.  Follow up in 2 weeks for staple removal and re-evaluation of the postop plan.  At that visit he will be placed into a hinged elbow brace unlocked for an arc of motion from .  This brace will be unlocked an additional 15° in either direction per week until range of motion is full.  He will be referred to therapy.        Please be aware that this note has been generated with the assistance of bailey voice-to-text.  Please excuse any spelling or grammatical errors.

## 2025-03-07 NOTE — PROGRESS NOTES
Dr. Euceda is the supervising physician for this encounter/patient    Charlie Mazariegos Jr. presents for post-operative evaluation.  The patient is now 2 weeks s/p ORIF left elbow with coronoid ORIF, left elbow ulnar collateral ligament repair, loose body excision of the left elbow, left ulnar nerve decompression, and primary repair of the flexor pronator myotendinous rupture with Dr. Euceda on February 26, 2025.  Overall the patient reports doing well, and he reports a 0/10 pain.  He does note occasional hand cramping and numbness to the left ring and small finger. The patient is taking Advil and Tylenol for post operative pain control. Patient admits to improving range of motion. He denies fevers, chills, night sweats, drainage, erythema, and warmth from the wound. He presents today for his 2 week post operative evaluation with no further complaints.    PE:    AA&O x 4.  NAD  HEENT:  NCAT, sclera nonicteric  Lungs:  Respirations are equal and unlabored.  CV:  2+ bilateral upper and lower extremity pulses.  MSK: The wound is healing well with no signs of erythema or warmth.  There is no drainage.  No clinical signs or symptoms of infection are present.  Staples discontinued today.  Steri-Strips applied.  He is able to make a loose composite fist with the left hand. Ulnar nerve motor function is intact on physical exam today.  He is neurovascularly intact to the left upper extremity.    Imaging:  X-rays of the patient's left elbow reveal 2 screws in the coronoid process with subtle fracture lines still noted.  No notable hardware failure or significant joint effusion.    FINDINGS:  Postoperative changes of internal fixation of the coronoid fracture identified.  The position and alignment is satisfactory    A/P: Status post above, doing well  1) Continue with non weight bearing. We will place him into a hinged elbow brace unlocked for an arc of motion from . This brace will be unlocked an additional 15° in  either direction per week until range of motion is full. Refill 600 mg sent to patient's pharmacy today for break through pain.   2) Continue active and passive range of motion exercises.  We will place occupational therapy orders today for gentle range of motion to the left elbow.  3) All sutures removed today. Wound care and signs of infection discussed.  Begin gentle scar massage with Mederma, vitamin-E oil, or cocoa butter once steri strips fall off.  4) F/U 4 weeks with updated x-rays of the left elbow out of hinged brace or sooner for any problems.  Work note provided with updated restrictions for light duty and return to work on Friday, March 14, 2025.  5) Call with any questions/concerns in the interim    Disclaimer:  This note is prepared using voice recognition software and as such is likely to have errors and has not been proof read. Please contact me for questions.     Lisandra Lazaro PA-C  Orthopedic/Hand Surgery

## 2025-03-10 NOTE — ANESTHESIA POSTPROCEDURE EVALUATION
Anesthesia Post Evaluation    Patient: Charlie Mazariegos Jr.    Procedure(s) Performed: Procedure(s) (LRB):  ORIF, ELBOW - LEFT CORONOID PROCESS ORIF, LIGAMENT REPAIR, AND ANY INIDCATED PROCEDURES (Left)    Final Anesthesia Type: general      Patient location during evaluation: PACU  Patient participation: Yes- Able to Participate  Level of consciousness: awake and alert and oriented  Post-procedure vital signs: reviewed and stable  Pain management: adequate  Airway patency: patent    PONV status at discharge: No PONV  Anesthetic complications: no      Cardiovascular status: blood pressure returned to baseline and hemodynamically stable  Respiratory status: unassisted, room air and spontaneous ventilation  Hydration status: euvolemic  Follow-up not needed.              Vitals Value Taken Time   /75 02/26/25 17:15   Temp 36.6 °C (97.9 °F) 02/26/25 16:51   Pulse 76 02/26/25 17:21   Resp 11 02/26/25 17:15   SpO2 95 % 02/26/25 17:15         Event Time   Out of Recovery 17:18:00         Pain/Pancho Score: No data recorded

## 2025-03-12 ENCOUNTER — HOSPITAL ENCOUNTER (OUTPATIENT)
Dept: RADIOLOGY | Facility: HOSPITAL | Age: 45
Discharge: HOME | End: 2025-03-12
Payer: COMMERCIAL

## 2025-03-12 ENCOUNTER — OFFICE VISIT (OUTPATIENT)
Dept: ORTHOPEDICS | Facility: CLINIC | Age: 45
End: 2025-03-12
Payer: COMMERCIAL

## 2025-03-12 DIAGNOSIS — S42.402A CLOSED FRACTURE OF LEFT ELBOW, INITIAL ENCOUNTER: ICD-10-CM

## 2025-03-12 DIAGNOSIS — M25.522 LEFT ELBOW PAIN: ICD-10-CM

## 2025-03-12 DIAGNOSIS — Z98.890 POST-OPERATIVE STATE: Primary | ICD-10-CM

## 2025-03-12 PROCEDURE — 73080 X-RAY EXAM OF ELBOW: CPT | Mod: TC,LT

## 2025-03-12 PROCEDURE — 99999 PR PBB SHADOW E&M-EST. PATIENT-LVL III: CPT | Mod: PBBFAC,,,

## 2025-03-12 PROCEDURE — 73080 X-RAY EXAM OF ELBOW: CPT | Mod: 26,LT,, | Performed by: RADIOLOGY

## 2025-03-12 RX ORDER — IBUPROFEN 600 MG/1
600 TABLET ORAL EVERY 8 HOURS PRN
Qty: 30 TABLET | Refills: 0 | Status: SHIPPED | OUTPATIENT
Start: 2025-03-12

## 2025-03-13 ENCOUNTER — PATIENT MESSAGE (OUTPATIENT)
Dept: ORTHOPEDICS | Facility: CLINIC | Age: 45
End: 2025-03-13
Payer: COMMERCIAL

## 2025-03-13 ENCOUNTER — DOCUMENTATION ONLY (OUTPATIENT)
Dept: ORTHOPEDICS | Facility: CLINIC | Age: 45
End: 2025-03-13
Payer: COMMERCIAL

## 2025-03-13 ENCOUNTER — CLINICAL SUPPORT (OUTPATIENT)
Dept: REHABILITATION | Facility: HOSPITAL | Age: 45
End: 2025-03-13
Payer: COMMERCIAL

## 2025-03-13 DIAGNOSIS — S42.402A CLOSED FRACTURE OF LEFT ELBOW, INITIAL ENCOUNTER: ICD-10-CM

## 2025-03-13 DIAGNOSIS — M25.522 LEFT ELBOW PAIN: Primary | ICD-10-CM

## 2025-03-13 DIAGNOSIS — M25.622 ELBOW STIFFNESS, LEFT: ICD-10-CM

## 2025-03-13 PROCEDURE — 97165 OT EVAL LOW COMPLEX 30 MIN: CPT

## 2025-03-13 PROCEDURE — 97530 THERAPEUTIC ACTIVITIES: CPT

## 2025-03-13 PROCEDURE — 97110 THERAPEUTIC EXERCISES: CPT

## 2025-03-13 NOTE — PATIENT INSTRUCTIONS
[facility], OCCUPATIONAL THERAPY  HOME EXERCISE PROGRAM               Complete massage 2-3 minutes 4 times a day:        Complete 10 repetitions of each exercise 4 times a day:Keep brace in place:    Actively bend and straighten your elbow.                   Start with erect posture. Lower shoulders.       Maintaining erect posture, draw shoulders back while bringing elbows back and inward.        Copyright © [facility]. All rights reserved.       JHONY Gamboa CHT  Certified Hand Therapist  Occupational Therapist         Copyright © [facility]. All rights reserved.     Therapist: CHAPINCITO Rothman/L, VEE

## 2025-03-17 ENCOUNTER — CLINICAL SUPPORT (OUTPATIENT)
Dept: REHABILITATION | Facility: HOSPITAL | Age: 45
End: 2025-03-17
Payer: COMMERCIAL

## 2025-03-17 DIAGNOSIS — M25.522 LEFT ELBOW PAIN: Primary | ICD-10-CM

## 2025-03-17 DIAGNOSIS — M25.622 ELBOW STIFFNESS, LEFT: ICD-10-CM

## 2025-03-17 PROCEDURE — 97110 THERAPEUTIC EXERCISES: CPT

## 2025-03-17 PROCEDURE — 97530 THERAPEUTIC ACTIVITIES: CPT

## 2025-03-17 NOTE — PROGRESS NOTES
Outpatient Rehab    Occupational Therapy Visit    Patient Name: Charlie Mazariegos Jr.  MRN: [id number]  YOB: 1980  Encounter Date: 3/17/2025    Therapy Diagnosis:   Encounter Diagnoses   Name Primary?    Left elbow pain Yes    Elbow stiffness, left      Physician: Lisandra Lazaro PA-C    Physician Orders:1) Continue with non weight bearing. We will place him into a hinged elbow brace unlocked for an arc of motion from . This brace will be unlocked an additional 15° in either direction per week until range of motion is full. Refill 600 mg sent to patient's pharmacy today for break through pain.   2) Continue active and passive range of motion exercises.  We will place occupational therapy orders today for gentle range of motion to the left elbow.  3) All sutures removed today. Wound care and signs of infection discussed.  Begin gentle scar massage with Mederma, vitamin-E oil, or cocoa butter once steri strips fall off.    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    Not rated  today.    Objective   Orthosis Details  In this visit, actions taken with the first orthosis variety included Modification/adjustment.          Orthosis Laterality: Left  Fabrication Status: Pre-fabricated                    Orthosis Position: Adjusted hinge brace to properly align it. and allow the proper amount of flex and extension.This too time due to the nature of elbow hinge brace. Educated with snap shot with his phone for reference.              Treatment:  Therapeutic Exercise  TE 1: AROM wrist flex/ext. 10 reps,  radial/ulnar deviation 10r eps  TE 3: Elbow AROM: FA in neutral  with hinge brace in place and opened 70 to 120 x 2/10 reps  Therapeutic Activity  TA 2: Removed steristrips. Clear drainge noted form two small superficial openings . Cleansed and redressed with emulsion dressing, guauze pad, and lightly applied coban.  No signs of infection. Fit with applied size F tubi  and XL compress glove. Educated in proper sx site care including cleasing with saline and redressing as done in clinic today.  Issused supplies to allow drg changes one time per day or more if the drainage soils or moistens the dressing.  Modalities  Moist Heat (min): Pt received moist heat X 10 minutes to elbow and wrist to decrease pain and increase soft tissue extensibility    Time Entry(in minutes):  Hot/Cold Pack Time Entry: 10  Orthotic/Prosthetic Mgmt and/or Training (Subs Encounter) Time Entry: 15  Therapeutic Activity Time Entry: 15  Therapeutic Exercise Time Entry: 15    Assessment & Plan   Assessment: Hinge brace required adjustement due to shifting. AROM is good within allowed range, with elbow flex not yet reaching brace limits.  Evaluation/Treatment Tolerance: Patient tolerated treatment well    Patient will continue to benefit from skilled outpatient occupational therapy to address the deficits listed in the problem list box on initial evaluation, provide pt/family education and to maximize pt's level of  independence in the home and community environment.     Patient's spiritual, cultural, and educational needs considered and patient agreeable to plan of care and goals.           Plan: Per instructions from FATIMAH Lazaro, the hinge will be adjusted to allow 15 degrees more of elbow flex/ext combind.    Goals:   Active       Long Term Goals       [place] will demonstrate significantly improved functional performance from re-assessment as measured by a 20 ncrease in FOTO function score.        Start:  03/13/25            Patient will achieve left  strength that is 90% that of the .right dominant hand to improve function with ADLs/work/leisure tasks.        Start:  03/13/25            Patient will report pain 2 out of 10 at worst during use  in IADL's to improve function with ADLs/work/leisure tasks..        Start:  03/13/25            Patient will demo full fa rom  to improve functional grasp for ADLs/work/leisure tasks.         Start:  03/13/25            Pt will demo full wrist arom to improve function with ADLs/work/leisure tasks.        Start:  03/13/25               short term goals       [place] will demonstrate significantly improved functional performance from re-assessment as measured by a 10 point Increase in FOTO function score.        Start:  03/13/25            Patient will achieve left  strength that is 60 % that of the  right dominant hand to improve function with ADLs/work/leisure tasks.        Start:  03/13/25            Patient will report pain 2 out of 10 at worst during use in light ADL's to improve function with ADLs/work/leisure tasks. .        Start:  03/13/25            Patient will demo 10 to 120 degrees elbow arom for ADLs/work/leisure tasks.         Start:  03/13/25            Pt will demo full arom FA  to improve function with ADLs/work/leisure tasks.        Start:  03/13/25                Mariola Durand OT

## 2025-03-20 ENCOUNTER — CLINICAL SUPPORT (OUTPATIENT)
Dept: REHABILITATION | Facility: HOSPITAL | Age: 45
End: 2025-03-20
Payer: COMMERCIAL

## 2025-03-20 DIAGNOSIS — M25.622 ELBOW STIFFNESS, LEFT: ICD-10-CM

## 2025-03-20 DIAGNOSIS — M25.522 LEFT ELBOW PAIN: Primary | ICD-10-CM

## 2025-03-20 PROCEDURE — 97110 THERAPEUTIC EXERCISES: CPT

## 2025-03-20 NOTE — PROGRESS NOTES
Outpatient Rehab    Occupational Therapy Visit    Patient Name: Charlie Mazariegos Jr.  MRN: [id number]  YOB: 1980  Encounter Date: 3/20/2025    Therapy Diagnosis:   Encounter Diagnoses   Name Primary?    Left elbow pain Yes    Elbow stiffness, left      Physician: Lisandra Lazaro PA-C    Physician Orders: 1) Continue with non weight bearing. We will place him into a hinged elbow brace unlocked for an arc of motion from . This brace will be unlocked an additional 15° in either direction per week until range of motion is full. Refill 600 mg sent to patient's pharmacy today for break through pain.   2) Continue active and passive range of motion exercises.  We will place occupational therapy orders today for gentle range of motion to the left elbow.  3) All sutures removed today. Wound care and signs of infection discussed.  Begin gentle scar massage with Mederma, vitamin-E oil, or cocoa butter once steri strips fall off.  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    Not rated today.    Objective            Treatment:  Therapeutic Exercise  TE 1: AROM wrist flex/ext. 2/10 reps,  radial/ulnar deviation 2/10 reps  TE 2: AROM fist 2/10 reps each  TE 3: Elbow AROM: FA in neutral  with hinge brace in place and opened 60 to 120 x 2/10 reps    Time Entry(in minutes):  Hot/Cold Pack Time Entry: 10  Therapeutic Exercise Time Entry: 15    Assessment & Plan   Assessment: Incision now fully closed. Elbow ext to limit of brace, Flexion to @ 110  Evaluation/Treatment Tolerance: Patient tolerated treatment well    Patient will continue to benefit from skilled outpatient occupational therapy to address the deficits listed in the problem list box on initial evaluation, provide pt/family education and to maximize pt's level of independence in the home and community environment.     Patient's spiritual, cultural, and educational needs considered and patient agreeable to plan of care and goals.           Plan: Per instructions from FATIMAH Lazaro, the hinge will be adjusted to allow 15 degrees more of elbow flex/ext combind per week. Continue with POC.    Goals:   Active       Long Term Goals       Charlie will demonstrate significantly improved functional performance from re-assessment as measured by a 20 ncrease in FOTO function score.        Start:  03/13/25            Patient will achieve left  strength that is 90% that of the .right dominant hand to improve function with ADLs/work/leisure tasks.        Start:  03/13/25            Patient will report pain 2 out of 10 at worst during use  in IADL's to improve function with ADLs/work/leisure tasks..        Start:  03/13/25            Patient will demo full fa rom  to improve functional grasp for ADLs/work/leisure tasks.         Start:  03/13/25            Pt will demo full wrist arom to improve function with ADLs/work/leisure tasks.        Start:  03/13/25               short term goals       [place] will demonstrate significantly improved functional performance from re-assessment as  measured by a 10 point Increase in FOTO function score.        Start:  03/13/25            Patient will achieve left  strength that is 60 % that of the  right dominant hand to improve function with ADLs/work/leisure tasks.        Start:  03/13/25            Patient will report pain 2 out of 10 at worst during use in light ADL's to improve function with ADLs/work/leisure tasks. .        Start:  03/13/25            Patient will demo 10 to 120 degrees elbow arom for ADLs/work/leisure tasks.         Start:  03/13/25            Pt will demo full arom FA  to improve function with ADLs/work/leisure tasks.        Start:  03/13/25                Mariola Durand, OT

## 2025-03-24 ENCOUNTER — TELEPHONE (OUTPATIENT)
Dept: ORTHOPEDICS | Facility: CLINIC | Age: 45
End: 2025-03-24
Payer: COMMERCIAL

## 2025-03-27 ENCOUNTER — CLINICAL SUPPORT (OUTPATIENT)
Dept: REHABILITATION | Facility: HOSPITAL | Age: 45
End: 2025-03-27
Payer: COMMERCIAL

## 2025-03-27 DIAGNOSIS — M25.522 LEFT ELBOW PAIN: Primary | ICD-10-CM

## 2025-03-27 DIAGNOSIS — M25.622 ELBOW STIFFNESS, LEFT: ICD-10-CM

## 2025-03-27 PROCEDURE — 97140 MANUAL THERAPY 1/> REGIONS: CPT

## 2025-03-27 PROCEDURE — 97110 THERAPEUTIC EXERCISES: CPT

## 2025-03-27 NOTE — PROGRESS NOTES
Outpatient Rehab    Occupational Therapy Visit    Patient Name: Charlie Mazariegos Jr.  MRN: [id number]  YOB: 1980  Encounter Date: 3/27/2025    Therapy Diagnosis:   Encounter Diagnoses   Name Primary?    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         Objective           Treatment:  Therapeutic Exercise  TE 1: AROM wrist flex/ext. 2/10 reps,  radial/ulnar deviation 2/10 reps  TE 2: AROM fist 2/10 reps each  TE 3: Elbow AROM: FA in neutral  with hinge brace in place and opened 60 to 120 x 20 reps  TE 4: Opened hinge brace ext limit to (-40 degrees)  Manual Therapy  MT 1: Manual edema mobilization  MT 2: Fit with small Edema Weas and large compression glove to address persistent edema  Modalities  Moist Heat (min): Pt received moist heat X 10 minutes to elbow and wrist to decrease pain and increase soft tissue extensibility    Time Entry(in minutes):  Hot/Cold Pack Time Entry:  10  Manual Therapy Time Entry: 20  Therapeutic Exercise Time Entry: 20    Assessment & Plan   Assessment: Edema persists. Addessiing with compression glove, Edema Wear, and edema mobilization massage. Able to ext to limit of brace. Flexion not yet to brace limit. Pt displays excellent compliance.  Evaluation/Treatment Tolerance: Patient tolerated treatment well    Patient will continue to benefit from skilled outpatient occupational therapy to address the deficits listed in the problem list box on initial evaluation, provide pt/family education and to maximize pt's level of independence in the home and community environment.     Patient's spiritual, cultural, and educational needs considered and patient agreeable to plan of care and goals.           Plan:      Goals:   Active       Long Term Goals       Charlie will demonstrate significantly improved functional performance from re-assessment as measured by a 20 ncrease in FOTO function score.        Start:  03/13/25            Patient will achieve left  strength that is 90% that of the .right dominant hand to improve function with ADLs/work/leisure tasks.        Start:  03/13/25            Patient will report pain 2 out of 10 at worst during use  in IADL's to improve function with ADLs/work/leisure tasks..        Start:  03/13/25            Patient will demo full fa rom  to improve functional grasp for ADLs/work/leisure tasks.         Start:  03/13/25            Pt will demo full wrist arom to improve function with ADLs/work/leisure tasks.        Start:  03/13/25               short term goals       Charlie will demonstrate significantly improved functional performance from re-assessment as measured by a 10 point Increase in FOTO function score.        Start:  03/13/25            Patient will achieve left  strength that is 60 % that of the  right dominant hand to improve function with ADLs/work/leisure tasks.        Start:  03/13/25            Patient will report  pain 2 out of 10 at worst during use in light ADL's to improve function with ADLs/work/leisure tasks. .        Start:  03/13/25            Patient will demo 10 to 120 degrees elbow arom for ADLs/work/leisure tasks.         Start:  03/13/25            Pt will demo full arom FA  to improve function with ADLs/work/leisure tasks.        Start:  03/13/25                Mariola Durand OT

## 2025-03-31 ENCOUNTER — CLINICAL SUPPORT (OUTPATIENT)
Dept: REHABILITATION | Facility: HOSPITAL | Age: 45
End: 2025-03-31
Payer: COMMERCIAL

## 2025-03-31 DIAGNOSIS — M25.522 LEFT ELBOW PAIN: Primary | ICD-10-CM

## 2025-03-31 DIAGNOSIS — M25.622 ELBOW STIFFNESS, LEFT: ICD-10-CM

## 2025-03-31 PROCEDURE — 97110 THERAPEUTIC EXERCISES: CPT

## 2025-03-31 NOTE — PROGRESS NOTES
Outpatient Rehab    Occupational Therapy Visit    Patient Name: Charlie Mazariegos Jr.  MRN: [id number]  YOB: 1980  Encounter Date: 3/31/2025    Therapy Diagnosis:   Encounter Diagnoses   Name Primary?    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    Not rated today.    Objective           Treatment:  Therapeutic Exercise  TE 3: Elbow AROM: FA in neutral: range 48 to 126: 2/20+ reps  TE 4: Opened hinge brace ext limit to (-40 degrees)  TE 5: Gentle FA arom: Sup to @ 20 degrees and FA to @ 80 degrees. 2/20 reps. Added to HEP  Manual Therapy  MT 1: Manual edema mobilization  MT 2: Fit with small Edema Weas and large compression glove to address persistent edema  Modalities  Moist Heat (min): Pt received moist heat X 10 minutes to elbow and wrist to decrease pain and increase soft tissue extensibility    Time Entry(in minutes):  Hot/Cold Pack Time Entry: 10  Manual Therapy Time  Entry: 5  Therapeutic Exercise Time Entry: 30    Assessment & Plan   Assessment: Edema reposonding well to compression glove and sleeve, though significant amount remains. Elbow arom improving though not to full allowed arom per protocol.  Evaluation/Treatment Tolerance: Patient tolerated treatment well    Patient will continue to benefit from skilled outpatient occupational therapy to address the deficits listed in the problem list box on initial evaluation, provide pt/family education and to maximize pt's level of independence in the home and community environment.     Patient's spiritual, cultural, and educational needs considered and patient agreeable to plan of care and goals.           Plan: Cont per OT POC with focus on return to functional independence    Goals:   Active       Long Term Goals       [place] will demonstrate significantly improved functional performance from re-assessment as measured by a 20 ncrease in FOTO function score.        Start:  03/13/25            Patient will achieve left  strength that is 90% that of the .right dominant hand to improve function with ADLs/work/leisure tasks.        Start:  03/13/25            Patient will report pain 2 out of 10 at worst during use  in IADL's to improve function with ADLs/work/leisure tasks..        Start:  03/13/25            Patient will demo full fa rom  to improve functional grasp for ADLs/work/leisure tasks.         Start:  03/13/25            Pt will demo full wrist arom to improve function with ADLs/work/leisure tasks.        Start:  03/13/25               short term goals       [place] will demonstrate significantly improved functional performance from re-assessment as measured by a 10 point Increase in FOTO function score.        Start:  03/13/25            Patient will achieve left  strength that is 60 % that of the  right dominant hand to improve function with ADLs/work/leisure tasks.        Start:  03/13/25            Patient will  report pain 2 out of 10 at worst during use in light ADL's to improve function with ADLs/work/leisure tasks. .        Start:  03/13/25            Patient will demo 10 to 120 degrees elbow arom for ADLs/work/leisure tasks.         Start:  03/13/25            Pt will demo full arom FA  to improve function with ADLs/work/leisure tasks.        Start:  03/13/25                Mariola Durand OT

## 2025-04-02 NOTE — PROGRESS NOTES
Dr. Euceda is the supervising physician for this encounter/patient    Charlie Mazariegos Jr. presents for post-operative evaluation.  The patient is now 6 weeks s/p ORIF left elbow with coronoid ORIF, left elbow ulnar collateral ligament repair, loose body excision of the left elbow, left ulnar nerve decompression, and primary repair of the flexor pronator myotendinous rupture with Dr. Euceda on February 26, 2025.  Overall the patient reports doing well, and he reports a 0/10 pain.  He is still experiencing numbness to the left ring and small finger. The patient is taking OTC ibuprofen for post operative pain control. Patient admits to improving range of motion with OT twice a week.  He is still significantly swollen to the left elbow at this time.  He reports compliance with a compression sleeve, and his hinged brace.  He presents today for his 6 week post operative evaluation with no further complaints.    PE:  AA&O x 4.  NAD  HEENT:  NCAT, sclera nonicteric  Lungs:  Respirations are equal and unlabored.  CV:  2+ bilateral upper and lower extremity pulses.  MSK: The wound is healing well with no signs of erythema or warmth.  There is no drainage.  No clinical signs or symptoms of infection are present. He is able to make a loose composite fist with the left hand.  Elbow range of motion is restricted and mildly painful. Extension is to 30 degrees and flexion is to approximately 60 degrees. Ulnar nerve motor function is intact on physical exam today.  He is neurovascularly intact to the left upper extremity.    FINDINGS:  Patient has had a rib repair of a fracture of the coronoid process of the ulna alignment is satisfactory.  Some soft tissue swelling is present.  I do not see any obvious joint effusion.  Soft tissue swelling can be seen posteriorly over the proximal ulna.    A/P: Status post above, doing well  1) Continue with non weight bearing to left upper extremity.  He is to discontinue his hinged brace at  this time with motion as tolerated to the left upper extremity.  He is to continue utilizing his compression sleeve for postoperative swelling.  Ibuprofen 600 mg tablets sent to patient's pharmacy today to assist in postoperative edema.  He is to take this medication with food in the morning and at night.  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  2) Continue active and passive range of motion exercises.  Continue OT as scheduled.  We will up his OT visits to 3 times a week.  Message sent to therapy today to increase his OT visits from 2 to 3 times a week.  3) Continue gentle scar massage with Mederma, vitamin-E oil, or cocoa butter.  4) F/U 4 weeks with Dr. Euceda after EMG and updated x-rays of the left elbow  or sooner for any problems.  Work note provided with updated restrictions for light duty.  5) Call with any questions/concerns in the interim    Disclaimer:  This note is prepared using voice recognition software and as such is likely to have errors and has not been proof read. Please contact me for questions.     Lisandra Lazaro PA-C  Orthopedic/Hand Surgery

## 2025-04-03 ENCOUNTER — CLINICAL SUPPORT (OUTPATIENT)
Dept: REHABILITATION | Facility: HOSPITAL | Age: 45
End: 2025-04-03
Payer: COMMERCIAL

## 2025-04-03 DIAGNOSIS — M25.522 LEFT ELBOW PAIN: Primary | ICD-10-CM

## 2025-04-03 DIAGNOSIS — M25.622 ELBOW STIFFNESS, LEFT: ICD-10-CM

## 2025-04-03 PROCEDURE — 97110 THERAPEUTIC EXERCISES: CPT

## 2025-04-03 PROCEDURE — 97140 MANUAL THERAPY 1/> REGIONS: CPT

## 2025-04-03 NOTE — PROGRESS NOTES
Outpatient Rehab    Occupational Therapy Visit    Patient Name: Charlie Mazariegos Jr.  MRN: [id number]  YOB: 1980  Encounter Date: 4/3/2025    Therapy Diagnosis:   Encounter Diagnoses   Name Primary?    Left elbow pain Yes    Elbow stiffness, left      Physician: Lisandra Lazaro PA-C    Physician Orders: Eval and Treat  Medical Diagnosis: Unspecified fracture of lower end of left humerus, initial encounter for closed fracture    Visit # / Visits Authorized: 5 / 20  Insurance Authorization Period: 3/13/2025 to 12/31/2025  DOS 2/26/205    PROCEDURE(S) PERFORMED: Open reduction internal fixation intra-articular left elbow fracture with coronoid fracture ORIF Primary repair left elbow ulnar collateral ligament, CPT code 22685 Loose body removal left elbow, multiple CPT code 26759 Ulnar nerve decompression and extensive external neurolysis left elbow, CPT code 57424 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    Not rated today.    Objective           Treatment:  Therapeutic Exercise  TE 1: AROM wrist flex/ext. 10+ reps,  radial/ulnar deviation 10+ reps  TE 2: AROM fist 2/10 reps each  TE 3: Elbow AROM: FA in neutral: range 40 to 120:2/20+ reps  TE 4: Opened hinge brace ext limit to (-30 degrees)  TE 5: Gentle FA arom: Sup to @ 50- 60 degrees and FA pro to @ 80 degrees. 20+ reps.  Manual Therapy  MT 1: Manual edema mobilization x10 min  Modalities  Moist Heat (min): Pt received moist heat X 10 minutes to elbow and wrist to decrease pain and increase soft tissue extensibility    Time Entry(in minutes):  Hot/Cold Pack Time Entry: 10  Manual Therapy Time Entry: 8  Therapeutic Activity  Time Entry: 3  Therapeutic Exercise Time Entry: 19    Assessment & Plan   Assessment: Edema reposonding well to compression glove and sleeve, though significant amount remains. Elbow arom improving well within limits of protocol.  Evaluation/Treatment Tolerance: Patient tolerated treatment well    Patient will continue to benefit from skilled outpatient occupational therapy to address the deficits listed in the problem list box on initial evaluation, provide pt/family education and to maximize pt's level of independence in the home and community environment.     Patient's spiritual, cultural, and educational needs considered and patient agreeable to plan of care and goals.           Plan: Cont per OT POC with focus on return to functional independence    Goals:   Active       Long Term Goals       [place] will demonstrate significantly improved functional performance from re-assessment as measured by a 20 ncrease in FOTO function score.        Start:  03/13/25            Patient will achieve left  strength that is 90% that of the .right dominant hand to improve function with ADLs/work/leisure tasks.        Start:  03/13/25            Patient will report pain 2 out of 10 at worst during use  in IADL's to improve function with ADLs/work/leisure tasks..        Start:  03/13/25            Patient will demo full fa rom  to improve functional grasp for ADLs/work/leisure tasks.         Start:  03/13/25            Pt will demo full wrist arom to improve function with ADLs/work/leisure tasks.        Start:  03/13/25               short term goals       Charlie will demonstrate significantly improved functional performance from re-assessment as measured by a 10 point Increase in FOTO function score.        Start:  03/13/25            Patient will achieve left  strength that is 60 % that of the  right dominant hand to improve function with ADLs/work/leisure tasks.        Start:  03/13/25            Patient will report pain 2  out of 10 at worst during use in light ADL's to improve function with ADLs/work/leisure tasks. .        Start:  03/13/25            Patient will demo 10 to 120 degrees elbow arom for ADLs/work/leisure tasks.         Start:  03/13/25            Pt will demo full arom FA  to improve function with ADLs/work/leisure tasks.        Start:  03/13/25                Mariola Durand, OT

## 2025-04-07 ENCOUNTER — PATIENT MESSAGE (OUTPATIENT)
Dept: ORTHOPEDICS | Facility: CLINIC | Age: 45
End: 2025-04-07
Payer: COMMERCIAL

## 2025-04-07 ENCOUNTER — CLINICAL SUPPORT (OUTPATIENT)
Dept: REHABILITATION | Facility: HOSPITAL | Age: 45
End: 2025-04-07
Payer: COMMERCIAL

## 2025-04-07 DIAGNOSIS — M25.522 LEFT ELBOW PAIN: Primary | ICD-10-CM

## 2025-04-07 DIAGNOSIS — M25.622 ELBOW STIFFNESS, LEFT: ICD-10-CM

## 2025-04-07 PROCEDURE — 97110 THERAPEUTIC EXERCISES: CPT

## 2025-04-07 PROCEDURE — 97112 NEUROMUSCULAR REEDUCATION: CPT

## 2025-04-07 NOTE — PROGRESS NOTES
Outpatient Rehab    Occupational Therapy Visit    Patient Name: Charlie Mazariegos Jr.  MRN: [id number]  YOB: 1980  Encounter Date: 4/7/2025    Therapy Diagnosis:   Encounter Diagnoses   Name Primary?    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  Pain reported as 4/10. Not rated today.    Objective           Treatment:  Therapeutic Exercise  TE 3: Elbow AROM: FA in neutral: :2/20+ reps  TE 4: Gentle AROM Elbow: Isolated ext, isolated flex 10 reps ea  TE 5: Gentle FA arom:. 2/20+ reps.  Opened hinge brace fully.   Balance/Neuromuscular Re-Education  NMR 1: [place] Balance 3 min  NMR 2: Wrist Maze x 3 min  NMR 3: Yellow flex bar oscillations 2 ways, 1 min each  Modalities  Moist Heat (min): Pt received moist heat X 10 minutes to elbow and wrist to decrease pain and increase soft tissue extensibility    Time Entry(in minutes):  Hot/Cold Pack Time Entry: 10  Neuromuscular Re-Education Time Entry: 10  Therapeutic Exercise Time Entry:  20    Assessment & Plan   Assessment: Edema continuing to reposond well to compression glove and sleeve, though significant amount remains. Elbow arom improving well. Not formally reassessed today, however, upon clinical observation, good improvements noted in all planes.  Evaluation/Treatment Tolerance: Patient tolerated treatment well    Patient will continue to benefit from skilled outpatient occupational therapy to address the deficits listed in the problem list box on initial evaluation, provide pt/family education and to maximize pt's level of independence in the home and community environment.     Patient's spiritual, cultural, and educational needs considered and patient agreeable to plan of care and goals.           Plan: Cont per OT POC with focus on return to functional independence    Goals:   Active       Long Term Goals       [place] will demonstrate significantly improved functional performance from re-assessment as measured by a 20 ncrease in FOTO function score.        Start:  03/13/25            Patient will achieve left  strength that is 90% that of the .right dominant hand to improve function with ADLs/work/leisure tasks.        Start:  03/13/25            Patient will report pain 2 out of 10 at worst during use  in IADL's to improve function with ADLs/work/leisure tasks..        Start:  03/13/25            Patient will demo full fa rom  to improve functional grasp for ADLs/work/leisure tasks.         Start:  03/13/25            Pt will demo full wrist arom to improve function with ADLs/work/leisure tasks.        Start:  03/13/25               short term goals       Charlie will demonstrate significantly improved functional performance from re-assessment as measured by a 10 point Increase in FOTO function score.        Start:  03/13/25            Patient will achieve left  strength that is 60 % that of the  right dominant hand to improve function with ADLs/work/leisure tasks.        Start:   03/13/25            Patient will report pain 2 out of 10 at worst during use in light ADL's to improve function with ADLs/work/leisure tasks. .        Start:  03/13/25            Patient will demo 10 to 120 degrees elbow arom for ADLs/work/leisure tasks.         Start:  03/13/25            Pt will demo full arom FA  to improve function with ADLs/work/leisure tasks.        Start:  03/13/25                Mariola Durand, OT

## 2025-04-09 ENCOUNTER — TELEPHONE (OUTPATIENT)
Dept: ORTHOPEDICS | Facility: CLINIC | Age: 45
End: 2025-04-09
Payer: COMMERCIAL

## 2025-04-09 ENCOUNTER — OFFICE VISIT (OUTPATIENT)
Dept: ORTHOPEDICS | Facility: CLINIC | Age: 45
End: 2025-04-09
Payer: COMMERCIAL

## 2025-04-09 ENCOUNTER — HOSPITAL ENCOUNTER (OUTPATIENT)
Dept: RADIOLOGY | Facility: HOSPITAL | Age: 45
Discharge: HOME | End: 2025-04-09
Payer: COMMERCIAL

## 2025-04-09 DIAGNOSIS — S42.402A CLOSED FRACTURE OF LEFT ELBOW, INITIAL ENCOUNTER: ICD-10-CM

## 2025-04-09 DIAGNOSIS — Z98.890 POST-OPERATIVE STATE: Primary | ICD-10-CM

## 2025-04-09 DIAGNOSIS — M25.522 LEFT ELBOW PAIN: ICD-10-CM

## 2025-04-09 PROCEDURE — 73080 X-RAY EXAM OF ELBOW: CPT | Mod: TC,LT

## 2025-04-09 PROCEDURE — 73080 X-RAY EXAM OF ELBOW: CPT | Mod: 26,LT,, | Performed by: RADIOLOGY

## 2025-04-09 PROCEDURE — 99999 PR PBB SHADOW E&M-EST. PATIENT-LVL III: CPT | Mod: PBBFAC,,,

## 2025-04-09 RX ORDER — IBUPROFEN 600 MG/1
600 TABLET ORAL EVERY 8 HOURS PRN
Qty: 30 TABLET | Refills: 2 | Status: SHIPPED | OUTPATIENT
Start: 2025-04-09

## 2025-04-09 NOTE — TELEPHONE ENCOUNTER
Called patient to discuss obtaining an EMG to evaluate for ulnar neuropathy.  Patient was agreeable to scheduling this study.  I advised the patient that my medical assistant will reach out to schedule the EMG.  Patient is to continue with gentle range-of-motion to left elbow as tolerated.  He is to limit his lifting to left upper extremity at this time.  He will follow up with Dr. Euceda in approximately 3-4 weeks.

## 2025-04-09 NOTE — TELEPHONE ENCOUNTER
Spoke w/ patient to schedule EMG appt per provider's request. He accepted an appointment for May with Dr. Liao w/ [facility]. Reschedule follow up w/ Dr. Euceda.

## 2025-04-10 ENCOUNTER — CLINICAL SUPPORT (OUTPATIENT)
Dept: REHABILITATION | Facility: HOSPITAL | Age: 45
End: 2025-04-10
Payer: COMMERCIAL

## 2025-04-10 DIAGNOSIS — M25.622 ELBOW STIFFNESS, LEFT: ICD-10-CM

## 2025-04-10 DIAGNOSIS — M25.522 LEFT ELBOW PAIN: Primary | ICD-10-CM

## 2025-04-10 PROCEDURE — 97110 THERAPEUTIC EXERCISES: CPT

## 2025-04-10 PROCEDURE — 97112 NEUROMUSCULAR REEDUCATION: CPT

## 2025-04-10 NOTE — PATIENT INSTRUCTIONS
[facility], OCCUPATIONAL THERAPY  HOME EXERCISE PROGRAM                 Complete 10 repetitions of each exercise 4-6 times a day:                Complete the following strengthening program 2x/day.          3 minutes        3 seconds, 20 reps               5 logs  _   5 logs           JHONY Gamboa, TAYLAT  Certified Hand Therapist  Occupational Therapist

## 2025-04-10 NOTE — PROGRESS NOTES
Outpatient Rehab    Occupational Therapy Visit    Patient Name: Charlie Mazariegos Jr.  MRN: [id number]  YOB: 1980  Encounter Date: 4/10/2025    Therapy Diagnosis:   Encounter Diagnoses   Name Primary?    Left elbow pain Yes    Elbow stiffness, left      Physician: Lisandra Lazaro PA-C    Physician Orders: Eval and Treat  Medical Diagnosis: Unspecified fracture of lower end of left humerus, initial encounter for closed fracture    PROCEDURE(S) PERFORMED: Open reduction internal fixation intra-articular left elbow fracture with coronoid fracture ORIF Primary repair left elbow ulnar collateral ligament, CPT code 56196 Loose body removal left elbow, multiple CPT code 60800 Ulnar nerve decompression and extensive external neurolysis left elbow, CPT code 67150 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, CPT code 60746    DOS 2/26/205    Visit # / Visits Authorized: 7 / 20  Insurance Authorization Period: 3/13/2025 to 12/31/2025  Date of Evaluation: 3/13/2025     Plan of Care Certification:3/13/2025 to 6/6/2025      Time In: 1545   Time Out: 1648  Total Time: 63   Total Billable Time: 53    FOTO:  Intake Score: 50%  Survey Score 1: 54%  Survey Score 2:  %         Subjective   Charlie was without new complaints..    Not rated today.    Objective           Treatment:  Therapeutic Exercise  TE 3: Elbow AROM: FA in 3 planes: 1/20 reps each plane  TE 4: Gentle PROM Elbow:: ext with fa in 3 planes, 10+ reps each  TE 5: Gentle FA arom:. 2/20+ reps.  TE 6: Gentle AAROM: Isolated ext, FA in 3 planes, 1/10 each plane  TE 7: AROM isolated flex , fa in neutral, fa in sup, 10 reps ea  TE 8: Educated in updated AROM HEP  Balance/Neuromuscular Re-Education  NMR 1: Wrist Isomterics with yellow flex bar: wrist ext, wrist flex, dart throwers, 20 reps each  NMR 2: Wrist Maze x 3 min  NMR 3: Yellow flex bar oscillations 2 ways, 1 min each  NMR 4: Therapy Putty: Twirling x 3 minutues, Squeezing x 3 seconds  x 20 reps, pinching 5 logs each for 3-jaw and key pinch with soft putty. Educated in putty HEP.  Modalities  Moist Heat (min): Pt received moist heat X 10 minutes to elbow and wrist to decrease pain and increase soft tissue extensibility    Time Entry(in minutes):  Neuromuscular Re-Education Time Entry: 25  Therapeutic Exercise Time Entry: 28    Assessment & Plan   Assessment: Received updated referral to discontinue hinge brace. Added gentle passive and active assisited elbow ext, wrist isometrics, and putty exercises and [place] tolerated all very well.       Patient will continue to benefit from skilled outpatient occupational therapy to address the deficits listed in the problem list box on initial evaluation, provide pt/family education and to maximize pt's level of independence in the home and community environment.     Patient's spiritual, cultural, and educational needs considered and patient agreeable to plan of care and goals.           Plan: Cont per OT POC with focus on return to functional independence    Goals:   Active       Long Term Goals       Charlie will demonstrate significantly improved functional performance from re-assessment as measured by a 20 ncrease in FOTO function score.        Start:  03/13/25            Patient will achieve left  strength that is 90% that of the .right dominant hand to improve function with ADLs/work/leisure tasks.        Start:  03/13/25            Patient will report pain 2 out of 10 at worst during use  in IADL's to improve function with ADLs/work/leisure tasks..        Start:  03/13/25            Patient will demo full fa rom  to improve functional grasp for ADLs/work/leisure tasks.         Start:  03/13/25            Pt will demo full wrist arom to improve function with ADLs/work/leisure tasks.        Start:  03/13/25               short term goals       [place] will demonstrate significantly improved functional performance from re-assessment as measured by a 10 point  Increase in FOTO function score.        Start:  03/13/25            Patient will achieve left  strength that is 60 % that of the  right dominant hand to improve function with ADLs/work/leisure tasks.        Start:  03/13/25            Patient will report pain 2 out of 10 at worst during use in light ADL's to improve function with ADLs/work/leisure tasks. .        Start:  03/13/25            Patient will demo 10 to 120 degrees elbow arom for ADLs/work/leisure tasks.         Start:  03/13/25            Pt will demo full arom FA  to improve function with ADLs/work/leisure tasks.        Start:  03/13/25                Mariola Durand, OT

## 2025-04-11 NOTE — PROGRESS NOTES
Outpatient Rehab    Occupational Therapy Visit    Patient Name: Charlie Mazariegos Jr.  MRN: [id number]  YOB: 1980  Encounter Date: 4/14/2025    Therapy Diagnosis:   Encounter Diagnoses   Name Primary?    Left elbow pain Yes    Elbow stiffness, left      Physician: Lisandra Lazaro PA-C    Physician Orders: Eval and Treat  Medical Diagnosis: Unspecified fracture of lower end of left humerus, initial encounter for closed fracture    Visit # / Visits Authorized: 8 / 20  Insurance Authorization Period: 3/13/2025 to 12/31/2025  Date of Evaluation: 3/13/2025   Plan of Care Certification:3/13/2025 to 6/6/2025      Time In: 1542   Time Out:    Total Time:     Total Billable Time:      FOTO:  Intake Score:  %  Survey Score 1:  %  Survey Score 2:  %         Subjective   anterior shoulder has been bothering him.  Pain reported as 0/10.      Objective           Treatment:  Therapeutic Exercise  TE 1: Scap AROM: elevation, retr, rolls x 10  TE 2: elbow flex/ext 3 ways, x 10 ea  TE 3: book openers on mat 2 x 10  TE 4: supine weightless dowel shoulder flexion 2 x 10  TE 5: supine elbow flex/ext AROM with 5 sec holds at end range x 10 ea  TE 6: ER x 20  Therapeutic Activity  TA 1: wrist maze x 3 min  TA 2: ocsillations with yellow flexbar 2 ways x 1 min ea  TA 3: yellow flexbar isometric: wrist ext, wrist flex, dart throwers x 20  Manual Therapy  MT 1: STM bicep, tricep, radial prox FA  Modalities  Moist Heat (min): Pt received moist heat X 10 minutes to elbow and wrist to decrease pain and increase soft tissue extensibility    Time Entry(in minutes):       Assessment & Plan   Assessment: good tolerance to added shoulder exercises today with no c/o pain  Evaluation/Treatment Tolerance: Patient tolerated treatment well    Patient will continue to benefit from skilled outpatient occupational therapy to address the deficits listed in the problem list box on initial evaluation, provide pt/family education and to  maximize pt's level of independence in the home and community environment.     Patient's spiritual, cultural, and educational needs considered and patient agreeable to plan of care and goals.         Client Care conference completed with evaluating therapist in regards to this patients POC as evidenced by co signature of supervising therapist.     Plan: Cont per OT POC with focus on return to functional independence    Goals:   Active       Long Term Goals       Charlie will demonstrate significantly improved functional performance from re-assessment as measured by a 20 ncrease in FOTO function score.  (Progressing)       Start:  03/13/25            Patient will achieve left  strength that is 90% that of the .right dominant hand to improve function with ADLs/work/leisure tasks.  (Progressing)       Start:  03/13/25            Patient will report pain 2 out of 10 at worst during use  in IADL's to improve function with ADLs/work/leisure tasks..  (Progressing)       Start:  03/13/25            Patient will demo full fa rom  to improve functional grasp for ADLs/work/leisure tasks.   (Progressing)       Start:  03/13/25            Pt will demo full wrist arom to improve function with ADLs/work/leisure tasks.  (Progressing)       Start:  03/13/25               short term goals       Charlie will demonstrate significantly improved functional performance from re-assessment as measured by a 10 point Increase in FOTO function score.  (Progressing)       Start:  03/13/25            Patient will achieve left  strength that is 60 % that of the  right dominant hand to improve function with ADLs/work/leisure tasks.  (Progressing)       Start:  03/13/25            Patient will report pain 2 out of 10 at worst during use in light ADL's to improve function with ADLs/work/leisure tasks. .  (Progressing)       Start:  03/13/25            Patient will demo 10 to 120 degrees elbow arom for ADLs/work/leisure tasks.   (Progressing)        Start:  03/13/25            Pt will demo full arom FA  to improve function with ADLs/work/leisure tasks.  (Progressing)       Start:  03/13/25                PEDRO Dawson/DAXA

## 2025-04-14 ENCOUNTER — CLINICAL SUPPORT (OUTPATIENT)
Dept: REHABILITATION | Facility: HOSPITAL | Age: 45
End: 2025-04-14
Payer: COMMERCIAL

## 2025-04-14 DIAGNOSIS — M25.622 ELBOW STIFFNESS, LEFT: ICD-10-CM

## 2025-04-14 DIAGNOSIS — M25.522 LEFT ELBOW PAIN: Primary | ICD-10-CM

## 2025-04-14 PROCEDURE — 97110 THERAPEUTIC EXERCISES: CPT | Mod: CO

## 2025-04-14 PROCEDURE — 97530 THERAPEUTIC ACTIVITIES: CPT | Mod: CO

## 2025-04-14 PROCEDURE — 97140 MANUAL THERAPY 1/> REGIONS: CPT | Mod: CO

## 2025-04-17 ENCOUNTER — CLINICAL SUPPORT (OUTPATIENT)
Dept: REHABILITATION | Facility: HOSPITAL | Age: 45
End: 2025-04-17
Payer: COMMERCIAL

## 2025-04-17 DIAGNOSIS — M25.522 LEFT ELBOW PAIN: Primary | ICD-10-CM

## 2025-04-17 DIAGNOSIS — M25.622 ELBOW STIFFNESS, LEFT: ICD-10-CM

## 2025-04-17 PROCEDURE — 97110 THERAPEUTIC EXERCISES: CPT

## 2025-04-17 NOTE — PROGRESS NOTES
Outpatient Rehab    Occupational Therapy Visit    Patient Name: Charlie Mazariegos Jr.  MRN: [id number]  YOB: 1980  Encounter Date: 4/17/2025    Therapy Diagnosis:   Encounter Diagnoses   Name Primary?    Left elbow pain Yes    Elbow stiffness, left      Physician: Lisandra Lazaro PA-C    Physician Orders: Eval and Treat  Medical Diagnosis: Unspecified fracture of lower end of left humerus, initial encounter for closed fracture    Visit # / Visits Authorized: 9 / 20  Insurance Authorization Period: 3/13/2025 to 12/31/2025    Date of Evaluation:  3/13/2025   Plan of Care Certification:  3/13/2025 to 6/6/2025   Time In: 1548   Time Out: 1647  Total Time: 59   Total Billable Time: 55    FOTO:  Intake Score:  %  Survey Score 1:  %  Survey Score 2:  %         Subjective   C/o continued anterior shoulder pain. Reports elbow is improving..    Not rated today.    Objective           Treatment:  Therapeutic Exercise  TE 1: Scap AROM: elevation, retr, rolls x 10  TE 2: Supine: FA 3 planes, elbow flex/ext  x 10 ea each plane  TE 3: Supine: FA 3 planes, elbow flex/ext x 10 ea each plane with 1 lb cuff weight  TE 4: supine 3 lb dowel shoulder flexion/ext 3 x 10  TE 5: book openers on mat 1 x 10  TE 6: gentle passive ER wall 2/10. added to HEP  TE 7: Yellow theraband shoulder ER 2/10 reps  TE 8: Supine: gentle passive ER shoulder x 10 reps  Manual Therapy  MT 1: Supine: shld distraction, joint gliding: anterior, posterior, inferior    Time Entry(in minutes):  Hot/Cold Pack Time Entry: 10  Manual Therapy Time Entry: 5  Therapeutic Exercise Time Entry: 40    Assessment & Plan   Assessment:    Evaluation/Treatment Tolerance: Patient tolerated treatment well    Patient will continue to benefit from skilled outpatient occupational therapy to address the deficits listed in the problem list box on initial evaluation, provide pt/family education and to maximize pt's level of independence in the home and community  environment.     Patient's spiritual, cultural, and educational needs considered and patient agreeable to plan of care and goals.           Plan: Cont per OT POC with focus on return to functional independence    Goals:   Active       Long Term Goals       [place] will demonstrate significantly improved functional performance from re-assessment as measured by a 20 ncrease in FOTO function score.  (Progressing)       Start:  03/13/25            Patient will achieve left  strength that is 90% that of the .right dominant hand to improve function with ADLs/work/leisure tasks.  (Progressing)       Start:  03/13/25            Patient will report pain 2 out of 10 at worst during use  in IADL's to improve function with ADLs/work/leisure tasks..  (Progressing)       Start:  03/13/25            Patient will demo full fa rom  to improve functional grasp for ADLs/work/leisure tasks.   (Progressing)       Start:  03/13/25            Pt will demo full wrist arom to improve function with ADLs/work/leisure tasks.  (Progressing)       Start:  03/13/25               short term goals       [place] will demonstrate significantly improved functional performance from re-assessment as measured by a 10 point Increase in FOTO function score.  (Progressing)       Start:  03/13/25            Patient will achieve left  strength that is 60 % that of the  right dominant hand to improve function with ADLs/work/leisure tasks.  (Progressing)       Start:  03/13/25            Patient will report pain 2 out of 10 at worst during use in light ADL's to improve function with ADLs/work/leisure tasks. .  (Progressing)       Start:  03/13/25            Patient will demo 10 to 120 degrees elbow arom for ADLs/work/leisure tasks.   (Progressing)       Start:  03/13/25            Pt will demo full arom FA  to improve function with ADLs/work/leisure tasks.  (Progressing)       Start:  03/13/25                Mariola Durand OT

## 2025-04-21 ENCOUNTER — CLINICAL SUPPORT (OUTPATIENT)
Dept: REHABILITATION | Facility: HOSPITAL | Age: 45
End: 2025-04-21
Payer: COMMERCIAL

## 2025-04-21 DIAGNOSIS — M25.622 ELBOW STIFFNESS, LEFT: ICD-10-CM

## 2025-04-21 DIAGNOSIS — M25.522 LEFT ELBOW PAIN: Primary | ICD-10-CM

## 2025-04-21 PROCEDURE — 97110 THERAPEUTIC EXERCISES: CPT | Mod: CO

## 2025-04-21 PROCEDURE — 97140 MANUAL THERAPY 1/> REGIONS: CPT | Mod: CO

## 2025-04-21 NOTE — PROGRESS NOTES
"  Outpatient Rehab    Occupational Therapy Visit    Patient Name: Charlie Mazariegos Jr.  MRN: [id number]  YOB: 1980  Encounter Date: 4/21/2025    Therapy Diagnosis:   Encounter Diagnoses   Name Primary?    Left elbow pain Yes    Elbow stiffness, left      Physician: Lisandra Lazaro PA-C    Physician Orders: Eval and Treat  Medical Diagnosis: Unspecified fracture of lower end of left humerus, initial encounter for closed fracture    Visit # / Visits Authorized: 11 / 20  Insurance Authorization Period: 3/13/2025 to 12/31/2025  Date of Evaluation:  3/13/2025   Plan of Care Certification:  3/13/2025 to 6/6/2025      Time In: 1548   Time Out: 1641  Total Time: 53   Total Billable Time: 45    FOTO:  Intake Score:  %  Survey Score 1:  %  Survey Score 2:  %         Subjective   continues shoulder pain, "It feels like its in the joint behind my shoulder" most with ER.    Not rated today.    Objective           Treatment:  Therapeutic Exercise  TE 1: AAROM to elbow flex/ext 2 ways supine on mat  TE 2: Supine: FA 3 planes, elbow flex/ext  x 10 ea each plane  TE 4: supine 3 lb dowel shoulder flexion/ext 3 x 10  TE 5: book openers on mat 1 x 10  TE 9: lat reaches x 10  TE 10: bicep stretch x 10  Manual Therapy  MT 2: subscap release, pec release  MT 3: STM to tricep  Modalities  Moist Heat (min): Pt received moist heat X 10 minutes to elbow and shoulder to decrease pain and increase soft tissue extensibility    Time Entry(in minutes):  Hot/Cold Pack Time Entry: 8  Manual Therapy Time Entry: 10  Therapeutic Exercise Time Entry: 35    Assessment & Plan   Assessment: demo's significant tightness in periscapular musculature.  Evaluation/Treatment Tolerance: Patient tolerated treatment well    Patient will continue to benefit from skilled outpatient occupational therapy to address the deficits listed in the problem list box on initial evaluation, provide pt/family education and to maximize pt's level of independence " in the home and community environment.     Patient's spiritual, cultural, and educational needs considered and patient agreeable to plan of care and goals.           Plan: Cont per OT POC with focus on return to functional independence    Goals:   Active       Long Term Goals       Charlie will demonstrate significantly improved functional performance from re-assessment as measured by a 20 ncrease in FOTO function score.  (Progressing)       Start:  03/13/25            Patient will achieve left  strength that is 90% that of the .right dominant hand to improve function with ADLs/work/leisure tasks.  (Progressing)       Start:  03/13/25            Patient will report pain 2 out of 10 at worst during use  in IADL's to improve function with ADLs/work/leisure tasks..  (Progressing)       Start:  03/13/25            Patient will demo full fa rom  to improve functional grasp for ADLs/work/leisure tasks.   (Progressing)       Start:  03/13/25            Pt will demo full wrist arom to improve function with ADLs/work/leisure tasks.  (Progressing)       Start:  03/13/25               short term goals       Charlie will demonstrate significantly improved functional performance from re-assessment as measured by a 10 point Increase in FOTO function score.  (Progressing)       Start:  03/13/25            Patient will achieve left  strength that is 60 % that of the  right dominant hand to improve function with ADLs/work/leisure tasks.  (Progressing)       Start:  03/13/25            Patient will report pain 2 out of 10 at worst during use in light ADL's to improve function with ADLs/work/leisure tasks. .  (Progressing)       Start:  03/13/25            Patient will demo 10 to 120 degrees elbow arom for ADLs/work/leisure tasks.   (Progressing)       Start:  03/13/25            Pt will demo full arom FA  to improve function with ADLs/work/leisure tasks.  (Progressing)       Start:  03/13/25                PEDRO Dawson/DAXA

## 2025-04-22 ENCOUNTER — CLINICAL SUPPORT (OUTPATIENT)
Dept: REHABILITATION | Facility: HOSPITAL | Age: 45
End: 2025-04-22
Payer: COMMERCIAL

## 2025-04-22 DIAGNOSIS — M25.512 PAIN IN JOINT OF LEFT SHOULDER: ICD-10-CM

## 2025-04-22 DIAGNOSIS — M25.522 LEFT ELBOW PAIN: Primary | ICD-10-CM

## 2025-04-22 DIAGNOSIS — M25.622 ELBOW STIFFNESS, LEFT: ICD-10-CM

## 2025-04-22 PROCEDURE — 97110 THERAPEUTIC EXERCISES: CPT

## 2025-04-22 PROCEDURE — 97140 MANUAL THERAPY 1/> REGIONS: CPT

## 2025-04-22 NOTE — PROGRESS NOTES
"  Outpatient Rehab    Occupational Therapy Visit    Patient Name: Charlie Mazariegos Jr.  MRN: [id number]  YOB: 1980  Encounter Date: 4/22/2025    Therapy Diagnosis:   Encounter Diagnoses   Name Primary?    Left elbow pain Yes    Elbow stiffness, left     Pain in joint of left shoulder      Physician: Lisandra Lazaro PA-C    Physician Orders: Eval and Treat  Medical Diagnosis: Unspecified fracture of lower end of left humerus, initial encounter for closed fracture    Visit # / Visits Authorized: 11 / 20  Insurance Authorization Period: 3/13/2025 to 12/31/2025  Date of Evaluation:  3/13/2025   Plan of Care Certification:  3/13/2025 to 6/6/2025      Time In: 0733   Time Out: 0833  Total Time: 60   Total Billable Time: 45    FOTO:  Intake Score: 50%  Survey Score 1: 54%  Survey Score 2:  %         Subjective   C/o persistent pain persits in "center of the joint"  of the shoulder.  Reports elbow pain at ends of range..    Reports 0/10 at rest, 4/10 with rom exercises and light use.    Objective           Treatment:  Therapeutic Exercise  TE 1: Supine:LLPS, gentle AAROM: Elbow Flex/Ext, witih FA in neutral and with FA in Sup, x 10+ ea each plane  TE 2: Seated: FA 3 planes, elbow flex/ext x 2/10 ea each plane with 1 lb dumbbell,  TE 3: FA sup/pro holding 1 lbs dumbbell 2/20 rep  TE 9: lat reaches x 2/10  Manual Therapy  MT 1: Supine: shld distraction, joint gliding: anterior, posterior, inferior  MT 2: subscap release, pec release    Time Entry(in minutes):  Hot/Cold Pack Time Entry: 10  Manual Therapy Time Entry: 10  Therapeutic Exercise Time Entry: 35    Assessment & Plan   Assessment: Tightness persists in periscapulart muscles. Pain persits in center of shoulder. Elbow arom appears to be improvings. Reports elbow pain at ends of range.       Patient will continue to benefit from skilled outpatient occupational therapy to address the deficits listed in the problem list box on initial evaluation, provide " pt/family education and to maximize pt's level of independence in the home and community environment.     Patient's spiritual, cultural, and educational needs considered and patient agreeable to plan of care and goals.           Plan:      Goals:   Active       Long Term Goals       Charlie will demonstrate significantly improved functional performance from re-assessment as measured by a 20 ncrease in FOTO function score.  (Progressing)       Start:  03/13/25            Patient will achieve left  strength that is 90% that of the .right dominant hand to improve function with ADLs/work/leisure tasks.  (Progressing)       Start:  03/13/25            Patient will report pain 2 out of 10 at worst during use  in IADL's to improve function with ADLs/work/leisure tasks..  (Progressing)       Start:  03/13/25            Patient will demo full fa rom  to improve functional grasp for ADLs/work/leisure tasks.   (Progressing)       Start:  03/13/25            Pt will demo full wrist arom to improve function with ADLs/work/leisure tasks.  (Progressing)       Start:  03/13/25               short term goals       Charlie will demonstrate significantly improved functional performance from re-assessment as measured by a 10 point Increase in FOTO function score.  (Progressing)       Start:  03/13/25            Patient will achieve left  strength that is 60 % that of the  right dominant hand to improve function with ADLs/work/leisure tasks.  (Progressing)       Start:  03/13/25            Patient will report pain 2 out of 10 at worst during use in light ADL's to improve function with ADLs/work/leisure tasks. .  (Progressing)       Start:  03/13/25            Patient will demo 10 to 120 degrees elbow arom for ADLs/work/leisure tasks.   (Progressing)       Start:  03/13/25            Pt will demo full arom FA  to improve function with ADLs/work/leisure tasks.  (Progressing)       Start:  03/13/25                Mariola uDrand OT

## 2025-04-24 ENCOUNTER — CLINICAL SUPPORT (OUTPATIENT)
Dept: REHABILITATION | Facility: HOSPITAL | Age: 45
End: 2025-04-24
Payer: COMMERCIAL

## 2025-04-24 DIAGNOSIS — M25.512 PAIN IN JOINT OF LEFT SHOULDER: ICD-10-CM

## 2025-04-24 DIAGNOSIS — M25.622 ELBOW STIFFNESS, LEFT: ICD-10-CM

## 2025-04-24 DIAGNOSIS — M25.522 LEFT ELBOW PAIN: Primary | ICD-10-CM

## 2025-04-24 PROCEDURE — 97110 THERAPEUTIC EXERCISES: CPT

## 2025-04-24 NOTE — PROGRESS NOTES
"  Outpatient Rehab    Occupational Therapy Visit    Patient Name: Charlie Mazariegos Jr.  MRN: [id number]  YOB: 1980  Encounter Date: 4/24/2025    Therapy Diagnosis:   Encounter Diagnoses   Name Primary?    Left elbow pain Yes    Elbow stiffness, left     Pain in joint of left shoulder      Physician: Lisandra Lazaro PA-C    Physician Orders: Eval and Treat  Medical Diagnosis: Unspecified fracture of lower end of left humerus, initial encounter for closed fracture  DATE OF PROCEDURE: 2/26/2025   Open reduction internal fixation intra-articular left elbow fracture with coronoid fracture ORIF  Primary repair left elbow ulnar collateral ligament, CPT code 23708  Loose body removal left elbow, multiple CPT code 94175  Ulnar nerve decompression and extensive external neurolysis left elbow, CPT code 61557  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 Reports that the shoulder is not improving..    Reports 0/10 at rest, 4/10 with rom exercises and light use. Rates shoulder pain at a 5/10.    Objective     Shoulder Range of Motion  Right Shoulder   Active (deg) Passive (deg) Pain   Flexion 145       Extension 73       Scaption         ABduction 160       ADduction         Horizontal ABduction         Horizontal ADduction         External Rotation (Shoulder ABducted 0 degrees) 95       External Rotation (Shoulder ABducted 45 degrees)         External Rotation (Shoulder ABducted 90 degrees)         Internal Rotation (Shoulder ABducted 0 degrees)         Internal " Rotation (Shoulder ABducted 45 degrees)         Internal Rotation (Shoulder ABducted 90 degrees)           Left Shoulder   Active (deg) Passive (deg) Pain   Flexion 135   Yes   Extension 62   Yes   Scaption         ABduction 160   Yes   ADduction         Horizontal ABduction         Horizontal ADduction         External Rotation (Shoulder ABducted 0 degrees) 85   Yes   External Rotation (Shoulder ABducted 45 degrees)         External Rotation (Shoulder ABducted 90 degrees)         Internal Rotation (Shoulder ABducted 0 degrees)         Internal Rotation (Shoulder ABducted 45 degrees)         Internal Rotation (Shoulder ABducted 90 degrees)                  Elbow/Forearm Range of Motion   Left Elbow/Forearm   Active (deg) Passive (deg) Pain   Flexion 126       Extension -26       Forearm Pronation         Forearm Supination                          Treatment:  Therapeutic Exercise  TE 1: Seated: :LLPS, gentle PROM: Elbow Flex witih FA in 2planes: neutral and Sup, x 10+ ea each plane  TE 2: Seated: :LLPS, gentle PROM: Elbow Ext witih FA in 2planes: neutral and Sup, x 10+ ea each plane  TE 3: Seated: FA 2  planes,neutral and Sup: gentle AAROM elbow flex/ext x 10+ each plane  TE 4: Seated: FA 3 planes, elbow Pre's,  2/10 ea each plane with 2 lb dumbbell,  TE 5: FA PREs with 3 lbs dumbbell holding on end x 2/20 reps  TE 6: gentle passive ER wall 10+ reps  TE 7: Yellow theraband shoulder ER 2/10 reps  TE 8: Supine: gentle passive ER shoulder x 10 reps (Painful )  TE 9: lat reaches x 10  TE 10: bicep stretch x 10  Modalities  Moist Heat (min): Pt received moist heat X 10 minutes to elbow and shoulder to decrease pain and increase soft tissue extensibility    Time Entry(in minutes):  Therapeutic Exercise Time Entry: 50    Assessment & Plan   Assessment: Pain persits in center of shoulder. AROM of shoulder assessed and reveals mild limitations in F/E/ER. AROM painful in ABD/Flex/ER/Ext Reassessment of Elbow arom shows good  progress.  Reports elbow pain at ends of range.     I requested a referral to PT for focused shoulder therapy.   Patient will continue to benefit from skilled outpatient occupational therapy to address the deficits listed in the problem list box on initial evaluation, provide pt/family education and to maximize pt's level of independence in the home and community environment.     Patient's spiritual, cultural, and educational needs considered and patient agreeable to plan of care and goals.           Plan: Cont per OT POC with focus on return to functional independence    Goals:   Active       Long Term Goals       [place] will demonstrate significantly improved functional performance from re-assessment as measured by a 20 ncrease in FOTO function score.  (Progressing)       Start:  03/13/25            Patient will achieve left  strength that is 90% that of the .right dominant hand to improve function with ADLs/work/leisure tasks.  (Progressing)       Start:  03/13/25            Patient will report pain 2 out of 10 at worst during use  in IADL's to improve function with ADLs/work/leisure tasks..  (Progressing)       Start:  03/13/25            Patient will demo full fa rom  to improve functional grasp for ADLs/work/leisure tasks.   (Progressing)       Start:  03/13/25            Pt will demo full wrist arom to improve function with ADLs/work/leisure tasks.  (Progressing)       Start:  03/13/25               short term goals       Charlie will demonstrate significantly improved functional performance from re-assessment as measured by a 10 point Increase in FOTO function score.  (Progressing)       Start:  03/13/25            Patient will achieve left  strength that is 60 % that of the  right dominant hand to improve function with ADLs/work/leisure tasks.  (Progressing)       Start:  03/13/25            Patient will report pain 2 out of 10 at worst during use in light ADL's to improve function with ADLs/work/leisure  tasks. .  (Progressing)       Start:  03/13/25            Patient will demo 10 to 120 degrees elbow arom for ADLs/work/leisure tasks.   (Progressing)       Start:  03/13/25            Pt will demo full arom FA  to improve function with ADLs/work/leisure tasks.  (Progressing)       Start:  03/13/25                Mariola Durand OT

## 2025-04-25 DIAGNOSIS — M25.512 ACUTE PAIN OF LEFT SHOULDER: Primary | ICD-10-CM

## 2025-04-28 ENCOUNTER — CLINICAL SUPPORT (OUTPATIENT)
Dept: REHABILITATION | Facility: HOSPITAL | Age: 45
End: 2025-04-28
Payer: COMMERCIAL

## 2025-04-28 DIAGNOSIS — M25.512 PAIN IN JOINT OF LEFT SHOULDER: ICD-10-CM

## 2025-04-28 DIAGNOSIS — M25.522 LEFT ELBOW PAIN: Primary | ICD-10-CM

## 2025-04-28 DIAGNOSIS — M25.622 ELBOW STIFFNESS, LEFT: ICD-10-CM

## 2025-04-28 PROCEDURE — 97112 NEUROMUSCULAR REEDUCATION: CPT

## 2025-04-28 PROCEDURE — 97110 THERAPEUTIC EXERCISES: CPT

## 2025-04-28 NOTE — PROGRESS NOTES
Outpatient Rehab    Occupational Therapy Visit    Patient Name: Charlie Mazariegos Jr.  MRN: [id number]  YOB: 1980  Encounter Date: 4/28/2025    Therapy Diagnosis:   Encounter Diagnoses   Name Primary?    Left elbow pain Yes    Elbow stiffness, left     Pain in joint of left shoulder      Physician: Lisandra Lazaro PA-C    Physician Orders: Eval and Treat  Medical Diagnosis: Unspecified fracture of lower end of left humerus, initial encounter for closed fracture  DATE OF PROCEDURE: 2/26/2025  1.  Open reduction internal fixation intra-articular left elbow fracture with coronoid fracture ORIF  2. Primary repair left elbow ulnar collateral ligament, CPT code 71275  3. Loose body removal left elbow, multiple CPT code 59946  4. Ulnar nerve decompression and extensive external neurolysis left elbow, CPT code 76794  5. Primary repair flexor pronator myotendinous rupture   Visit # / Visits Authorized: 13 / 20  Insurance Authorization Period: 3/13/2025 to 12/31/2025  Date of Evaluation:  3/13/2025   Plan of Care Certification:  3/13/2025 to 6/6/2025   Time In: 1520   Time Out: 1635  Total Time: 75   Total Billable Time: 75    FOTO:  Intake Score: 50%  Survey Score 1:  %  Survey Score 2:  %         Subjective   Nothing new reported today..    Last session, Charlie reported 0/10 at rest, 4/10 with rom exercises and light use. Rates shoulder pain at a 5/10.    Objective           Treatment:  Therapeutic Exercise  TE 1: Supine: :LLPS, gentle PROM: Elbow resting in extension with MHP one the elbow x 10 min  TE 2: SUPINE :LLPS, gentle PROM: Elbow Ext witih FA in 3planes: x 10+ ea each plane, PROM FA Sup and FA pro x 10+ reps  TE 3: SUPINE :LLPS, gentle AAROM: Elbow flex/ext,  witih FA in 3planes: x 10+ ea each plane  TE 4: Seated: FA 3 planes, elbow Pre's,  2/10 ea each plane with 2 lb dumbbell,  TE 5: Seated: FA 3 planes, elbow Pre's, 2/10 ea each plane with 2 lb dumbbell,  TE 6: Arm bike: level 1, 2 min each way  for rom and conditioning.  Therapeutic Activity  TA 2: Green flexbar twisting: FA sup, fa pro, wrist flex, wrist ext, wrist rd, wrist ud  20  reps each  Balance/Neuromuscular Re-Education  NMR 1: Weight Bearing: Blue web, 3 sec holds x 20 reps  NMR 2: Pulling: Blue web, 3 sec holds x 20 reps  NMR 3: [place] Stick:eric chen, 2.2 lb ball, 3 min carry    Time Entry(in minutes):  Neuromuscular Re-Education Time Entry: 10  Therapeutic Activity Time Entry: 10  Therapeutic Exercise Time Entry: 55    Assessment & Plan   Assessment: Referral has been palced by FATIMAH Lazaro for PT for shoulder therapy. I notifed Charlie to look out for a call to schedule this. OT to focu solely on the elbow/wrist/hand. Progressed today with new flex bar exercises, arm bike, sustained carry, and a slight increase in intensity of rom exercises. Charlie tolerate all very well minimal to no elbow pain.       Patient will continue to benefit from skilled outpatient occupational therapy to address the deficits listed in the problem list box on initial evaluation, provide pt/family education and to maximize pt's level of independence in the home and community environment.     Patient's spiritual, cultural, and educational needs considered and patient agreeable to plan of care and goals.           Plan:      Goals:   Active       Long Term Goals       [place] will demonstrate significantly improved functional performance from re-assessment as measured by a 20 ncrease in FOTO function score.  (Progressing)       Start:  03/13/25            Patient will achieve left  strength that is 90% that of the .right dominant hand to improve function with ADLs/work/leisure tasks.  (Progressing)       Start:  03/13/25            Patient will report pain 2 out of 10 at worst during use  in IADL's to improve function with ADLs/work/leisure tasks..  (Progressing)       Start:  03/13/25            Patient will demo full fa rom  to improve functional grasp for  ADLs/work/leisure tasks.   (Progressing)       Start:  03/13/25            Pt will demo full wrist arom to improve function with ADLs/work/leisure tasks.  (Progressing)       Start:  03/13/25               short term goals       Charlie will demonstrate significantly improved functional performance from re-assessment as measured by a 10 point Increase in FOTO function score.  (Progressing)       Start:  03/13/25            Patient will achieve left  strength that is 60 % that of the  right dominant hand to improve function with ADLs/work/leisure tasks.  (Progressing)       Start:  03/13/25            Patient will report pain 2 out of 10 at worst during use in light ADL's to improve function with ADLs/work/leisure tasks. .  (Progressing)       Start:  03/13/25            Patient will demo 10 to 120 degrees elbow arom for ADLs/work/leisure tasks.   (Progressing)       Start:  03/13/25            Pt will demo full arom FA  to improve function with ADLs/work/leisure tasks.  (Progressing)       Start:  03/13/25                Mariola Durand, OT

## 2025-04-29 ENCOUNTER — CLINICAL SUPPORT (OUTPATIENT)
Dept: REHABILITATION | Facility: HOSPITAL | Age: 45
End: 2025-04-29
Payer: COMMERCIAL

## 2025-04-29 DIAGNOSIS — M25.622 ELBOW STIFFNESS, LEFT: ICD-10-CM

## 2025-04-29 DIAGNOSIS — M25.512 PAIN IN JOINT OF LEFT SHOULDER: ICD-10-CM

## 2025-04-29 DIAGNOSIS — M25.522 LEFT ELBOW PAIN: Primary | ICD-10-CM

## 2025-04-29 PROCEDURE — 97110 THERAPEUTIC EXERCISES: CPT

## 2025-04-29 NOTE — PROGRESS NOTES
Outpatient Rehab    Occupational Therapy Visit    Patient Name: Charlie Mazariegos Jr.  MRN: [id number]  YOB: 1980  Encounter Date: 4/29/2025    Therapy Diagnosis:   Encounter Diagnoses   Name Primary?    Left elbow pain Yes    Elbow stiffness, left     Pain in joint of left shoulder      Physician: Lisandra Lazaro PA-C    Physician Orders: Eval and Treat  Medical Diagnosis: Unspecified fracture of lower end of left humerus, initial encounter for closed fracture    Visit # / Visits Authorized: 14 / 20  Insurance Authorization Period: 3/13/2025 to 12/31/2025  Date of Evaluation:  3/13/2025   Plan of Care Certification:  3/13/2025 to 6/6/2025      Time In: 1535   Time Out: 1630  Total Time: 55   Total Billable Time: 55    FOTO:  Intake Score:  %  Survey Score 1:  %  Survey Score 2:  %         Subjective   Nothing new reported today..         Objective           Treatment:  Therapeutic Exercise  TE 1: Supine: :LLPS, gentle PROM: Elbow resting in extension with MHP one the elbow x 10 min  TE 2: SUPINE :LLPS, gentle PROM: Elbow Ext witih FA in 3planes: x 10+ ea each plane. SUPINE :LLPS, gentle PROM: Elbow  flex with FA in 3planes: x 10+ ea each plane  TE 3: SUPINE :LLPS, gentle AAROM: Elbow flex/ext,  witih FA in 3planes: x 10+ ea each plane  TE 4: Seated: FA 3 planes, elbow Pre's,  2/10 ea each plane with 3 lb dumbbell,  TE 5: Seated: FA 3 planes, wrist  Pre's, 2/10 ea each plane with 2 lb dumbbell,  TE 6: Arm bike: level 1, 2 min each way for rom and conditioning.  TE 7: FA PRE's , 3 lbs, holding on end 2/20  Therapeutic Activity  TA 2: Green flexbar twisting: FA sup, fa pro, wrist flex, wrist ext, wrist rd, wrist ud  20  reps each    Time Entry(in minutes):  Therapeutic Exercise Time Entry: 55    Assessment & Plan   Assessment:    Evaluation/Treatment Tolerance: Patient tolerated treatment well    Patient will continue to benefit from skilled outpatient occupational therapy to address the deficits  listed in the problem list box on initial evaluation, provide pt/family education and to maximize pt's level of independence in the home and community environment.     Patient's spiritual, cultural, and educational needs considered and patient agreeable to plan of care and goals.           Plan: Continue per OT POC with focus on return to funcitonal independence    Goals:   Active       Long Term Goals       Charlie will demonstrate significantly improved functional performance from re-assessment as measured by a 20 ncrease in FOTO function score.  (Progressing)       Start:  03/13/25            Patient will achieve left  strength that is 90% that of the .right dominant hand to improve function with ADLs/work/leisure tasks.  (Progressing)       Start:  03/13/25            Patient will report pain 2 out of 10 at worst during use  in IADL's to improve function with ADLs/work/leisure tasks..  (Progressing)       Start:  03/13/25            Patient will demo full fa rom  to improve functional grasp for ADLs/work/leisure tasks.   (Progressing)       Start:  03/13/25            Pt will demo full wrist arom to improve function with ADLs/work/leisure tasks.  (Progressing)       Start:  03/13/25               short term goals       [place] will demonstrate significantly improved functional performance from re-assessment as measured by a 10 point Increase in FOTO function score.  (Progressing)       Start:  03/13/25            Patient will achieve left  strength that is 60 % that of the  right dominant hand to improve function with ADLs/work/leisure tasks.  (Progressing)       Start:  03/13/25            Patient will report pain 2 out of 10 at worst during use in light ADL's to improve function with ADLs/work/leisure tasks. .  (Progressing)       Start:  03/13/25            Patient will demo 10 to 120 degrees elbow arom for ADLs/work/leisure tasks.   (Progressing)       Start:  03/13/25            Pt will demo full arom FA   to improve function with ADLs/work/leisure tasks.  (Progressing)       Start:  03/13/25                Mariola Durand OT

## 2025-05-01 ENCOUNTER — CLINICAL SUPPORT (OUTPATIENT)
Dept: REHABILITATION | Facility: HOSPITAL | Age: 45
End: 2025-05-01
Payer: COMMERCIAL

## 2025-05-01 DIAGNOSIS — M25.622 ELBOW STIFFNESS, LEFT: ICD-10-CM

## 2025-05-01 DIAGNOSIS — M25.512 PAIN IN JOINT OF LEFT SHOULDER: ICD-10-CM

## 2025-05-01 DIAGNOSIS — G89.29 CHRONIC LEFT SHOULDER PAIN: Primary | ICD-10-CM

## 2025-05-01 DIAGNOSIS — M25.522 LEFT ELBOW PAIN: Primary | ICD-10-CM

## 2025-05-01 DIAGNOSIS — M25.512 CHRONIC LEFT SHOULDER PAIN: Primary | ICD-10-CM

## 2025-05-01 PROCEDURE — 97112 NEUROMUSCULAR REEDUCATION: CPT

## 2025-05-01 PROCEDURE — 97530 THERAPEUTIC ACTIVITIES: CPT

## 2025-05-01 PROCEDURE — 97110 THERAPEUTIC EXERCISES: CPT

## 2025-05-01 NOTE — PROGRESS NOTES
"  Outpatient Rehab    Occupational Therapy Visit    Patient Name: Charlie Mazariegos Jr.  MRN: [id number]  YOB: 1980  Encounter Date: 5/1/2025    Therapy Diagnosis:   Encounter Diagnoses   Name Primary?    Left elbow pain Yes    Elbow stiffness, left     Pain in joint of left shoulder      Physician: Lisandra Lazaro PA-C    Physician Orders: Eval and Treat  Medical Diagnosis: Unspecified fracture of lower end of left humerus, initial encounter for closed fracture  DATE OF PROCEDURE: 2/26/2025  1.  Open reduction internal fixation intra-articular left elbow fracture with coronoid fracture ORIF  2. Primary repair left elbow ulnar collateral ligament, CPT code 01721  3. Loose body removal left elbow, multiple CPT code 67687  4. Ulnar nerve decompression and extensive external neurolysis left elbow, CPT code 70152  5. 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  Pain reported as 4/10. Reports that he does not use it in actvities at greater than 4/10. "I stop."    Objective           Treatment:  Therapeutic Exercise  TE 1: Supine: :LLPS, gentle PROM: Elbow resting in extension with MHP one the elbow x 10 min  TE 2: SUPINE :LLPS, gentle PROM: Elbow Ext witih FA in 3planes: x 10+ ea each plane. SUPINE :LLPS, gentle PROM: Elbow  flex with FA in 3planes: x 10+ ea each plane  TE 3: SUPINE :LLPS, gentle AAROM: Elbow flex/ext,  witih FA in 3planes: x 10+ ea each plane  TE 4: Seated: FA 3 planes, elbow Pre's,  2/10 ea each plane with 3 lb dumbbell,  TE " 5: Seated: FA 3 planes, wrist  Pre's, 2/10 ea each plane with 2 lb dumbbell,  TE 7: FA PRE's , 3 lbs, holding on end   Therapeutic Activity  TA 2: BTE- 45 sec each of the followin: wrist flex, 504 wrist ext, 504 fa sup, 504 fa pro, 302 wrist rd, 302 wrist ud 162: 3-jaw pinch (45 sec), 162: lateral pinch (30 sec), 162:  (60 sec)  Balance/Neuromuscular Re-Education  NMR 1: Weight Bearing: Blue web, 3 sec holds x 20 reps  NMR 2: Pulling: Blue web, 3 sec holds x 20 reps  NMR 3: [place] Stick:purpls gustavo, 2.2 lb ball, 3 min carry    Time Entry(in minutes):  Neuromuscular Re-Education Time Entry: 10  Therapeutic Activity Time Entry: 15  Therapeutic Exercise Time Entry: 35    Assessment & Plan   Assessment: He continues to show very good tolerance to treatment and good increases in elbow arom.       Patient will continue to benefit from skilled outpatient occupational therapy to address the deficits listed in the problem list box on initial evaluation, provide pt/family education and to maximize pt's level of independence in the home and community environment.     Patient's spiritual, cultural, and educational needs considered and patient agreeable to plan of care and goals.           Plan: Continue per OT POC with focus on return to funcitonal independence    Goals:   Active       Long Term Goals       Charlie will demonstrate significantly improved functional performance from re-assessment as measured by a 20 ncrease in FOTO function score.  (Progressing)       Start:  25            Patient will achieve left  strength that is 90% that of the .right dominant hand to improve function with ADLs/work/leisure tasks.  (Progressing)       Start:  25            Patient will report pain 2 out of 10 at worst during use  in IADL's to improve function with ADLs/work/leisure tasks..  (Progressing)       Start:  25            Patient will demo full fa rom  to improve functional grasp for  ADLs/work/leisure tasks.   (Progressing)       Start:  03/13/25            Pt will demo full wrist arom to improve function with ADLs/work/leisure tasks.  (Progressing)       Start:  03/13/25               short term goals       Charlie will demonstrate significantly improved functional performance from re-assessment as measured by a 10 point Increase in FOTO function score.  (Progressing)       Start:  03/13/25            Patient will achieve left  strength that is 60 % that of the  right dominant hand to improve function with ADLs/work/leisure tasks.  (Progressing)       Start:  03/13/25            Patient will report pain 2 out of 10 at worst during use in light ADL's to improve function with ADLs/work/leisure tasks. .  (Progressing)       Start:  03/13/25            Patient will demo 10 to 120 degrees elbow arom for ADLs/work/leisure tasks.   (Progressing)       Start:  03/13/25            Pt will demo full arom FA  to improve function with ADLs/work/leisure tasks.  (Progressing)       Start:  03/13/25                Mariola Durand, OT

## 2025-05-02 ENCOUNTER — TELEPHONE (OUTPATIENT)
Dept: ORTHOPEDICS | Facility: CLINIC | Age: 45
End: 2025-05-02
Payer: COMMERCIAL

## 2025-05-02 NOTE — TELEPHONE ENCOUNTER
----- Message from Winston Kimball sent at 5/1/2025  2:10 PM CDT -----  Regarding: FW: Pt asking for referral    ----- Message -----  From: April Alvarez MA  Sent: 5/1/2025   8:51 AM CDT  To: [facility] Staff  Subject: Pt asking for referral                           Good morning!This patient stopped by the [facility] just now and stated he has recently had surgery with Dr. Euceda on L elbow, attending therapy now and feels he is improving; however, he has been having L shoulder pain and was hoping to get a referral to see someone for this. Pt stated he has previously had surgery on R shoulder - around 2014 - with Dr. Harmon and asked if he could see him again for the L now.I let him know that I'd send a message to Dr. Euceda's staff to assist with referral and that someone would reach out with an update for him later today!Thanks,April

## 2025-05-05 ENCOUNTER — CLINICAL SUPPORT (OUTPATIENT)
Dept: REHABILITATION | Facility: HOSPITAL | Age: 45
End: 2025-05-05
Payer: COMMERCIAL

## 2025-05-05 DIAGNOSIS — M25.512 PAIN IN JOINT OF LEFT SHOULDER: ICD-10-CM

## 2025-05-05 DIAGNOSIS — M25.622 ELBOW STIFFNESS, LEFT: ICD-10-CM

## 2025-05-05 DIAGNOSIS — M25.522 LEFT ELBOW PAIN: Primary | ICD-10-CM

## 2025-05-05 PROCEDURE — 97110 THERAPEUTIC EXERCISES: CPT | Mod: CO

## 2025-05-05 PROCEDURE — 97530 THERAPEUTIC ACTIVITIES: CPT | Mod: CO

## 2025-05-05 NOTE — PROGRESS NOTES
Outpatient Rehab    Occupational Therapy Visit    Patient Name: Charlie Mazariegos Jr.  MRN: [id number]  YOB: 1980  Encounter Date: 2025    Therapy Diagnosis:   Encounter Diagnoses   Name Primary?    Left elbow pain Yes    Elbow stiffness, left     Pain in joint of left shoulder      Physician: Lisandra Lazaro PA-C    Physician Orders: Eval and Treat  Medical Diagnosis: Unspecified fracture of lower end of left humerus, initial encounter for closed fracture    Visit # / Visits Authorized:   Insurance Authorization Period: 3/13/2025 to 2025  Date of Evaluation:  3/13/2025   Plan of Care Certification:  3/13/2025 to 2025      Time In: 1557   Time Out: 1657  Total Time (in minutes): 60   Total Billable Time (in minutes):      FOTO:  Intake Score:  %  Survey Score 2:  %  Survey Score 3:  %         Subjective   no issues to report.  Pain reported as 0/10.      Objective            Treatment:  Therapeutic Exercise  TE 1: Supine: :LLPS, gentle PROM: Elbow resting in extension with MHP one the elbow x 10 min  TE 2: SUPINE :LLPS, gentle PROM: Elbow Ext witih FA in 3planes: x 10+ ea each plane. SUPINE :LLPS, gentle PROM: Elbow  flex with FA in 3planes: x 10+ ea each plane  TE 4: Seated: FA 3 planes, elbow Pre's,  2/10 ea each plane with 3 lb dumbbell,  TE 5: Seated: FA 3 planes, wrist  Pre's, 2/10 ea each plane with 3 lb dumbbell,  TE 6: Arm bike: level 1, 2 min each way for rom and conditioning.  TE 7: FA PRE's , 3 lbs, holding on end 2/20  Therapeutic Activity  TA 2: BTE- 45 sec each of the followin: wrist flex, 504 wrist ext, 504 fa sup, 504 fa pro, 302 wrist rd, 302 wrist ud 162: 3-jaw pinch (45 sec), 162: lateral pinch (30 sec), 162:  (60 sec)  TA 3: green flexbar: wf/we twists, frowns, smiles x 20 ea    Time Entry(in minutes):  Therapeutic Activity Time Entry: 20  Therapeutic Exercise Time Entry: 40    Assessment & Plan   Assessment: He continues to show very good  tolerance to treatment and good increases in elbow arom.  Evaluation/Treatment Tolerance: Patient tolerated treatment well    Patient will continue to benefit from skilled outpatient occupational therapy to address the deficits listed in the problem list box on initial evaluation, provide pt/family education and to maximize pt's level of independence in the home and community environment.     Patient's spiritual, cultural, and educational needs considered and patient agreeable to plan of care and goals.           Plan: Continue per OT POC with focus on return to funcitonal independence    Goals:   Active       Long Term Goals       Charlie will demonstrate significantly improved functional performance from re-assessment as measured by a 20 ncrease in FOTO function score.  (Progressing)       Start:  03/13/25            Patient will achieve left  strength that is 90% that of the .right dominant hand to improve function with ADLs/work/leisure tasks.  (Progressing)       Start:  03/13/25            Patient will report pain 2 out of 10 at worst during use  in IADL's to improve function with ADLs/work/leisure tasks..  (Progressing)       Start:  03/13/25            Patient will demo full fa rom  to improve functional grasp for ADLs/work/leisure tasks.   (Progressing)       Start:  03/13/25            Pt will demo full wrist arom to improve function with ADLs/work/leisure tasks.  (Progressing)       Start:  03/13/25               short term goals       Charlie will demonstrate significantly improved functional performance from re-assessment as measured by a 10 point Increase in FOTO function score.  (Progressing)       Start:  03/13/25            Patient will achieve left  strength that is 60 % that of the  right dominant hand to improve function with ADLs/work/leisure tasks.  (Progressing)       Start:  03/13/25            Patient will report pain 2 out of 10 at worst during use in light ADL's to improve function with  ADLs/work/leisure tasks. .  (Progressing)       Start:  03/13/25            Patient will demo 10 to 120 degrees elbow arom for ADLs/work/leisure tasks.   (Progressing)       Start:  03/13/25            Pt will demo full arom FA  to improve function with ADLs/work/leisure tasks.  (Progressing)       Start:  03/13/25                PEDRO Dawson/DAXA

## 2025-05-06 ENCOUNTER — CLINICAL SUPPORT (OUTPATIENT)
Dept: REHABILITATION | Facility: HOSPITAL | Age: 45
End: 2025-05-06
Payer: COMMERCIAL

## 2025-05-06 DIAGNOSIS — M25.512 PAIN IN JOINT OF LEFT SHOULDER: ICD-10-CM

## 2025-05-06 DIAGNOSIS — M25.622 ELBOW STIFFNESS, LEFT: ICD-10-CM

## 2025-05-06 DIAGNOSIS — M25.522 LEFT ELBOW PAIN: Primary | ICD-10-CM

## 2025-05-06 PROCEDURE — 97110 THERAPEUTIC EXERCISES: CPT

## 2025-05-06 PROCEDURE — 97530 THERAPEUTIC ACTIVITIES: CPT

## 2025-05-06 NOTE — PROGRESS NOTES
Outpatient Rehab    Occupational Therapy Visit    Patient Name: Charlie Mazariegos Jr.  MRN: [id number]  YOB: 1980  Encounter Date: 2025    Therapy Diagnosis:   Encounter Diagnoses   Name Primary?    Left elbow pain Yes    Elbow stiffness, left     Pain in joint of left shoulder      Physician: Lisandra Lazaro PA-C    Physician Orders: Eval and Treat  Medical Diagnosis: Unspecified fracture of lower end of left humerus, initial encounter for closed fracture    Visit # / Visits Authorized:   Insurance Authorization Period: 3/13/2025 to 2025  Date of Evaluation:  3/13/2025   Plan of Care Certification:  3/13/2025 to 2025      Time In: 1535   Time Out: 1637  Total Time (in minutes): 62   Total Billable Time (in minutes): 60    FOTO:  Intake Score:  %  Survey Score 2:  %  Survey Score 3:  %         Subjective             Objective            Treatment:  Therapeutic Exercise  TE 1: Supine: :LLPS, gentle PROM: Elbow resting in extension with MHP one the elbow x 10 min  TE 2: SUPINE :LLPS, gentle PROM: Elbow Ext witih FA in 3planes: x 10+ ea each plane. SUPINE :LLPS, gentle PROM: Elbow  flex with FA in 3planes: x 10+ ea each plane  TE 3: SUPINE :LLPS, gentle AAROM: Elbow flex/ext,  witih FA in 3planes: x 10+ ea each plane  TE 4: Seated: FA 3 planes, elbow Pre's,  2/10 ea each plane with 5 lb dumbbell,  TE 5: Seated: FA 3 planes, wrist  Pre's, 2/10 ea each plane with 5 lb dumbbell,  TE 6: Arm bike: level 1.5, 2 min each way for rom and conditioning.  TE 7: FA PRE's , 5 lbs, holding on end   Therapeutic Activity  TA 2: BTE- 45 sec each of the followin: wrist flex, 504 wrist ext, 504 fa sup, 504 fa pro, 302 wrist rd, 302 wrist ud 162: 3-jaw pinch (45 sec), 162: lateral pinch (30 sec), 162:  (60 sec)    Time Entry(in minutes):  Therapeutic Activity Time Entry: 15  Therapeutic Exercise Time Entry: 45    Assessment & Plan   Assessment:    Evaluation/Treatment Tolerance: Patient  tolerated treatment well    Patient will continue to benefit from skilled outpatient occupational therapy to address the deficits listed in the problem list box on initial evaluation, provide pt/family education and to maximize pt's level of independence in the home and community environment.     Patient's spiritual, cultural, and educational needs considered and patient agreeable to plan of care and goals.           Plan: Continue per OT POC with focus on return to funcitonal independence    Goals:   Active       Long Term Goals       [place] will demonstrate significantly improved functional performance from re-assessment as measured by a 20 ncrease in FOTO function score.  (Progressing)       Start:  03/13/25            Patient will achieve left  strength that is 90% that of the .right dominant hand to improve function with ADLs/work/leisure tasks.  (Progressing)       Start:  03/13/25            Patient will report pain 2 out of 10 at worst during use  in IADL's to improve function with ADLs/work/leisure tasks..  (Progressing)       Start:  03/13/25            Patient will demo full fa rom  to improve functional grasp for ADLs/work/leisure tasks.   (Progressing)       Start:  03/13/25            Pt will demo full wrist arom to improve function with ADLs/work/leisure tasks.  (Progressing)       Start:  03/13/25               short term goals       Charlie will demonstrate significantly improved functional performance from re-assessment as measured by a 10 point Increase in FOTO function score.  (Progressing)       Start:  03/13/25            Patient will achieve left  strength that is 60 % that of the  right dominant hand to improve function with ADLs/work/leisure tasks.  (Progressing)       Start:  03/13/25            Patient will report pain 2 out of 10 at worst during use in light ADL's to improve function with ADLs/work/leisure tasks. .  (Progressing)       Start:  03/13/25            Patient will demo 10  to 120 degrees elbow arom for ADLs/work/leisure tasks.   (Progressing)       Start:  03/13/25            Pt will demo full arom FA  to improve function with ADLs/work/leisure tasks.  (Progressing)       Start:  03/13/25                Mariola Durand OT

## 2025-05-08 ENCOUNTER — CLINICAL SUPPORT (OUTPATIENT)
Dept: REHABILITATION | Facility: HOSPITAL | Age: 45
End: 2025-05-08
Payer: COMMERCIAL

## 2025-05-08 DIAGNOSIS — M25.522 LEFT ELBOW PAIN: Primary | ICD-10-CM

## 2025-05-08 DIAGNOSIS — M25.622 ELBOW STIFFNESS, LEFT: ICD-10-CM

## 2025-05-08 PROCEDURE — 97530 THERAPEUTIC ACTIVITIES: CPT

## 2025-05-08 PROCEDURE — 97110 THERAPEUTIC EXERCISES: CPT

## 2025-05-08 NOTE — PROGRESS NOTES
Outpatient Rehab    Occupational Therapy Visit    Patient Name: Charlie Mazariegos Jr.  MRN: [id number]  YOB: 1980  Encounter Date: 5/8/2025    Therapy Diagnosis: No diagnosis found.  Physician: Lisandra Lazaro PA-C    Physician Orders: Eval and Treat  Medical Diagnosis: Unspecified fracture of lower end of left humerus, initial encounter for closed fracture  DATE OF PROCEDURE: 2/26/2025  1.  Open reduction internal fixation intra-articular left elbow fracture with coronoid fracture ORIF  2. Primary repair left elbow ulnar collateral ligament, CPT code 83535  3. Loose body removal left elbow, multiple CPT code 35198  4. Ulnar nerve decompression and extensive external neurolysis left elbow, CPT code 42536  5. 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, CPT code 19714  Visit # / Visits Authorized: 18 / 32  Insurance Authorization Period: 3/13/2025 to 12/31/2025  Date of Evaluation:  3/13/2025   Plan of Care Certification:  3/13/2025 to 6/6/2025   Time In: 1530   Time Out: 1630  Total Time (in minutes): 60   Total Billable Time (in minutes): 55    FOTO:  Intake Score:  %  Survey Score 2:  %  Survey Score 3:  %         Subjective             Objective            Treatment:  Therapeutic Exercise  TE 1: Supine: :LLPS, gentle PROM: Elbow resting in extension with MHP one the elbow x 10 min  TE 2: SUPINE :LLPS, gentle PROM: Elbow Ext witih FA in 3planes: x 10+ ea each plane. SUPINE :LLPS, gentle PROM: Elbow  flex with FA in 3planes: x 10+ ea each plane  TE 3: SUPINE :LLPS, gentle AAROM: Elbow flex/ext,  witih FA in 3planes: x 10+ ea each plane  TE 4: Seated: FA 3 planes, elbow Pre's,  2/10 ea each plane with 5 lb dumbbell,  TE 5: Seated: FA 3 planes, wrist  Pre's, 2/10 ea each plane with 5 lb dumbbell,  TE 7: FA PRE's , 5 lbs, holding on end 2/20  TE 8: Prone, 5 lbs, row 2/10 reps  TE 9: Prone: 3 lbs, elbow ext, 2/10 reps  Therapeutic Activity  TA 3: green flexbar: wf/we twists, frowns,  cy rodriguez, ud x 20 ea    Time Entry(in minutes):  Therapeutic Activity Time Entry: 10  Therapeutic Exercise Time Entry: 45    Assessment & Plan   Assessment: Pre-treat elbow ext = (-26) and post heat and rom = (-8). Tolerating treatmentvery well.       Patient will continue to benefit from skilled outpatient occupational therapy to address the deficits listed in the problem list box on initial evaluation, provide pt/family education and to maximize pt's level of independence in the home and community environment.     Patient's spiritual, cultural, and educational needs considered and patient agreeable to plan of care and goals.           Plan: Continue per OT POC with focus on return to funcitonal independence    Goals:   Active       Long Term Goals       Charlie will demonstrate significantly improved functional performance from re-assessment as measured by a 20 ncrease in FOTO function score.  (Progressing)       Start:  03/13/25            Patient will achieve left  strength that is 90% that of the .right dominant hand to improve function with ADLs/work/leisure tasks.  (Progressing)       Start:  03/13/25            Patient will report pain 2 out of 10 at worst during use  in IADL's to improve function with ADLs/work/leisure tasks..  (Progressing)       Start:  03/13/25            Patient will demo full fa rom  to improve functional grasp for ADLs/work/leisure tasks.   (Progressing)       Start:  03/13/25            Pt will demo full wrist arom to improve function with ADLs/work/leisure tasks.  (Progressing)       Start:  03/13/25               short term goals       [place] will demonstrate significantly improved functional performance from re-assessment as measured by a 10 point Increase in FOTO function score.  (Progressing)       Start:  03/13/25            Patient will achieve left  strength that is 60 % that of the  right dominant hand to improve function with ADLs/work/leisure tasks.  (Progressing)        Start:  03/13/25            Patient will report pain 2 out of 10 at worst during use in light ADL's to improve function with ADLs/work/leisure tasks. .  (Progressing)       Start:  03/13/25            Patient will demo 10 to 120 degrees elbow arom for ADLs/work/leisure tasks.   (Progressing)       Start:  03/13/25            Pt will demo full arom FA  to improve function with ADLs/work/leisure tasks.  (Progressing)       Start:  03/13/25                Mariola Durand OT

## 2025-05-12 ENCOUNTER — CLINICAL SUPPORT (OUTPATIENT)
Dept: REHABILITATION | Facility: HOSPITAL | Age: 45
End: 2025-05-12
Payer: COMMERCIAL

## 2025-05-12 DIAGNOSIS — M25.522 LEFT ELBOW PAIN: Primary | ICD-10-CM

## 2025-05-12 DIAGNOSIS — M25.622 ELBOW STIFFNESS, LEFT: ICD-10-CM

## 2025-05-12 DIAGNOSIS — M25.612 DECREASED RANGE OF MOTION OF SHOULDER, LEFT: Primary | ICD-10-CM

## 2025-05-12 DIAGNOSIS — M25.512 ACUTE PAIN OF LEFT SHOULDER: ICD-10-CM

## 2025-05-12 PROCEDURE — 97112 NEUROMUSCULAR REEDUCATION: CPT

## 2025-05-12 PROCEDURE — 97161 PT EVAL LOW COMPLEX 20 MIN: CPT

## 2025-05-12 PROCEDURE — 97110 THERAPEUTIC EXERCISES: CPT

## 2025-05-12 NOTE — PROGRESS NOTES
Outpatient Rehab    Occupational Therapy Visit    Patient Name: Charlie Mazariegos Jr.  MRN: [id number]  YOB: 1980  Encounter Date: 2025    Therapy Diagnosis: No diagnosis found.  Physician: Lisandra Lazaro PA-C    Physician Orders: Eval and Treat  Medical Diagnosis: Unspecified fracture of lower end of left humerus, initial encounter for closed fracture    Visit # / Visits Authorized:   Insurance Authorization Period: 3/13/2025 to 2025  Date of Evaluation: 3/13/2025  Plan of Care Certification: 3/13/2025 to 2025      Time In: 1530   Time Out: 1624  Total Time (in minutes): 54   Total Billable Time (in minutes): 54    FOTO:  Intake Score: 50%  Survey Score 2: 54%  Survey Score 3: 54%    Precautions:       Subjective   3-4/10 at end of ranges of the elbow with rom, pre's, and use. Denies pain at rest..  Pain reported as 4/10.      Objective            Treatment:  Therapeutic Exercise  TE 1: Supine: :LLPS, gentle PROM: Elbow resting in extension with MHP one the elbow x 10 min with 1 lb cuff weight  TE 2: SUPINE :LLPS, gentle PROM: Elbow Ext witih FA in 3planes: x 10+ ea each plane. SUPINE :LLPS, gentle PROM: Elbow  flex with FA in 3planes: x 10+ ea each plane  TE 3: SUPINE :LLPS, gentle AAROM: Elbow flex/ext,  witih FA in 3planes: x 10+ ea each plane  TE 4: Seated: FA 3 planes, elbow Pre's,  3/10 ea each plane with 5 lb dumbbell,  TE 5: Seated: FA 3 planes, wrist  Pre's, 3/10 ea each plane with 5 lb dumbbell,  TE 7: FA PRE's , 5 lbs, holding on end 3/20  TE 8: Prone, 5 lbs, row 3/10 reps  TE 9: Prone: 3 lbs, elbow ext, 3/10 reps  Therapeutic Activity  TA 2: BTE- 45 sec each of the followin: wrist flex, 504 wrist ext, 504 fa sup, 504 fa pro, 302 wrist rd, 302 wrist ud 162: 3-jaw pinch (45 sec), 162: lateral pinch (30 sec), 162:  (60 sec)  TA 3: green flexbar: wf/we twists, frowns, smiles, rd, ud x 20 ea    Time Entry(in minutes):  Therapeutic Exercise Time Entry:  54    Assessment & Plan   Assessment: Charlie able to manage more sets of all PRE's  today.       Patient will continue to benefit from skilled outpatient occupational therapy to address the deficits listed in the problem list box on initial evaluation, provide pt/family education and to maximize pt's level of independence in the home and community environment.     Patient's spiritual, cultural, and educational needs considered and patient agreeable to plan of care and goals.           Plan: Continue per OT POC with focus on return to funcitonal independence    Goals:   Active       Long Term Goals       [place] will demonstrate significantly improved functional performance from re-assessment as measured by a 20 ncrease in FOTO function score.  (Progressing)       Start:  03/13/25            Patient will achieve left  strength that is 90% that of the .right dominant hand to improve function with ADLs/work/leisure tasks.  (Progressing)       Start:  03/13/25            Patient will report pain 2 out of 10 at worst during use  in IADL's to improve function with ADLs/work/leisure tasks..  (Progressing)       Start:  03/13/25            Patient will demo full fa rom  to improve functional grasp for ADLs/work/leisure tasks.   (Progressing)       Start:  03/13/25            Pt will demo full wrist arom to improve function with ADLs/work/leisure tasks.  (Progressing)       Start:  03/13/25               short term goals       Charlie will demonstrate significantly improved functional performance from re-assessment as measured by a 10 point Increase in FOTO function score.  (Progressing)       Start:  03/13/25            Patient will achieve left  strength that is 60 % that of the  right dominant hand to improve function with ADLs/work/leisure tasks.  (Progressing)       Start:  03/13/25            Patient will report pain 2 out of 10 at worst during use in light ADL's to improve function with ADLs/work/leisure tasks. .   (Progressing)       Start:  03/13/25            Patient will demo 10 to 120 degrees elbow arom for ADLs/work/leisure tasks.   (Progressing)       Start:  03/13/25            Pt will demo full arom FA  to improve function with ADLs/work/leisure tasks.  (Progressing)       Start:  03/13/25                Mariola Durand OT

## 2025-05-12 NOTE — PROGRESS NOTES
Outpatient Rehab    Physical Therapy Evaluation    Patient Name: Charlie Mazariegos Jr.  MRN: [id number]  YOB: 1980  Encounter Date: 5/12/2025    Therapy Diagnosis:   Encounter Diagnoses   Name Primary?    Acute pain of left shoulder     Decreased range of motion of shoulder, left Yes     Physician: Lisandra Lazaro PA-C    Physician Orders: Eval and Treat  Medical Diagnosis: Acute pain of left shoulder    Visit # / Visits Authorized:  1 / 1  Insurance Authorization Period: 4/25/2025 to 4/25/2026  Date of Evaluation: 5/12/2025  Plan of Care Certification: 5/12/2025 to 7/25/2025     Time In: 1435   Time Out: 1520  Total Time (in minutes): 45   Total Billable Time (in minutes): 45    Intake Outcome Measure for FOTO Survey    Therapist reviewed FOTO scores for Charlie Mazariegos Jr. on 5/12/2025.   FOTO report - see Media section or FOTO account episode details.     Intake Score:  % see media section    Precautions:       Subjective   History of Present Illness  Charlie is a 44 y.o. male who reports to physical therapy with a chief concern of Left shoulder pain.     The patient reports a medical diagnosis of M25.512 (ICD-10-CM) - Pain in left shoulder.  Patient reports a surgery of PROCEDURE(S) PERFORMED: Open reduction internal fixation intra-articular left elbow fracture with coronoid fracture ORIF Primary repair left elbow ulnar collateral ligament, CPT code 70756 Loose body removal left elbow, multiple CPT code 31666 Ulnar nerve decompression and extensive external neurolysis left elbow, CPT code 34599 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, CPT code 65486.          Dominant Hand: Right  History of Present Condition/Illness: Patient reports ongoing left shoulder pain for the last month and a half. Patient reports he was in a sling for 3 weeks due to dislocating his left elbow resulting in a ORIF. Patient reports he slipped on ice with his arm extended behind him with shoulder ER  and wrist extension. Patient reports pain when reaching across his body and when reaching overhead, and pain when donning doffing a jacket. Occasionally pain at night if he sleeps on his L side. History of R shoulder labrum repair in 2014 by Dr. Harmon; has appointment with Dr. Harmon on 6/5/2025.    Activities of Daily Living  Social history was obtained from Patient.                   Pain     Patient reports a current pain level of 0/10. Pain at best is reported as 0/10. Pain at worst is reported as 6/10.   Location: anterior shoulder  Clinical Progression (since onset): Stable  Pain Qualities: Sharp  Pain-Relieving Factors: Activity modification, Change in position  Pain-Aggravating Factors: Other (Comment)  Other Pain-Aggravating Factors: patient reports not reaching overhead         Review of Systems  Patient denies: Cancer History, Cardiac History, and Diabetes        Treatment History  Treatments  Previously Received Treatments: Yes  Previous Treatments: Other (Comment)  Other Previous Treatments: occupational therapy  Currently Receiving Treatments: Yes  Current Treatments: Other (Comment)  Other Current Treatments: occupational therapy    Living Arrangements  Living Situation  Housing: Home independently  Living Arrangements: Alone        Employment  Patient reports: Does the patient's condition impact their ability to work?  Employment Status: Employed full-time   [place]  on light duty;        Past Medical History/Physical Systems Review:   Charlie Caion   has a past medical history of Pre-hypertension.    Charlie Mazariegos Jr.  has a past surgical history that includes Cervical fusion (01/01/2011); Shoulder surgery; Fracture surgery; Spine surgery; and Open reduction and internal fixation (ORIF) of injury of elbow (Left, 2/26/2025).    Charlie has a current medication list which includes the following prescription(s): acetaminophen, cholecalciferol (vitamin d3),  cyanocobalamin (vitamin b-12), fluticasone propionate, ibuprofen, methocarbamol, oxycodone, and oxycodone-acetaminophen.    Review of patient's allergies indicates:  No Known Allergies     Objective            Treatment:  Balance/Neuromuscular Re-Education  NMR 1: Pateint education: HEP, plan of care, prognosis  NMR 2: OH flexion 4 lbs wand: 3x10  NMR 3: sidelying shoulder flexion, abductin, ER: 3x10 each 3 lbs    Time Entry(in minutes):  PT Evaluation (Low) Time Entry: 45  Neuromuscular Re-Education Time Entry: 23    Assessment & Plan   Assessment  Charlie presents with a condition of Low complexity.   Presentation of Symptoms: Stable  Will Comorbidities Impact Care: No       Functional Limitations: Activity tolerance, Completing self-care activities, Proprioception, Range of motion, Participating in leisure activities, Pain with ADLs/IADLs, Gross motor coordination  Impairments: Pain with functional activity, Impaired physical strength  Personal Factors Affecting Prognosis: Pain    Prognosis: Excellent  Assessment Details: Patient demonstrates deficits with range of motion, strength, and function that limit ability to participate in school, work, and recreational activities. They would benefit from skilled PT services to normalize kinetic chain mobility, strength, and function to safely return to their prior level of activity.    Plan  From a physical therapy perspective, the patient would benefit from: Skilled Rehab Services    Planned therapy interventions include: Therapeutic exercise, Therapeutic activities, Neuromuscular re-education, Manual therapy, ADLs/IADLs, Other (Comment), and Gait training. Dry Needling (prn)  Planned modalities to include: Biofeedback, Electrical stimulation - attended, Electrical stimulation - passive/unattended, Thermotherapy (hot pack), and Cryotherapy (cold pack).        Visit Frequency: 2 times Per Week for 10 Weeks.       This plan was discussed with Patient.   Discussion  participants: Agreed Upon Plan of Care  Plan details: Frequency and duration of treatment to be adjusted as needed          Patient's spiritual, cultural, and educational needs considered and patient agreeable to plan of care and goals.           Goals:   Active       long term goals       Pt will improve FOTO score to </= 20% to demonstrate improvements in functional mobility including carrying, moving, and handling objects  (Progressing)       Start:  05/12/25    Expected End:  07/25/25            Pt will improve impaired shoulder MMTs 1 grade B to improve strength for household duties.  (Progressing)       Start:  05/12/25    Expected End:  07/25/25            Pt will demonstrate improved perscapular strength and endurance to show improved OH mobility and activity tolerance. (Progressing)       Start:  05/12/25    Expected End:  07/25/25               short term goals       Pt will be independent with HEP to assist PT treatment in restoring pain free motion of the L shoulder. (Progressing)       Start:  05/12/25    Expected End:  06/13/25            Pt will improve impaired shoulder MMTs 1/2 grade B to improve strength for functional tasks.  (Progressing)       Start:  05/12/25    Expected End:  06/13/25            Pt will demonstrate improved postural awareness requiring less than 3 VC during therapeutic activity.  (Progressing)       Start:  05/12/25    Expected End:  06/13/25                Lupe Lemus, PT, DPT, OCS

## 2025-05-13 ENCOUNTER — CLINICAL SUPPORT (OUTPATIENT)
Dept: REHABILITATION | Facility: HOSPITAL | Age: 45
End: 2025-05-13
Payer: COMMERCIAL

## 2025-05-13 DIAGNOSIS — M25.622 ELBOW STIFFNESS, LEFT: ICD-10-CM

## 2025-05-13 DIAGNOSIS — M25.512 PAIN IN JOINT OF LEFT SHOULDER: ICD-10-CM

## 2025-05-13 DIAGNOSIS — M25.522 LEFT ELBOW PAIN: Primary | ICD-10-CM

## 2025-05-13 PROCEDURE — 97110 THERAPEUTIC EXERCISES: CPT

## 2025-05-13 NOTE — PROGRESS NOTES
"  Outpatient Rehab    Occupational Therapy Visit    Patient Name: Charlie Mazariegos Jr.  MRN: [id number]  YOB: 1980  Encounter Date: 2025    Therapy Diagnosis:   Encounter Diagnoses   Name Primary?    Left elbow pain Yes    Elbow stiffness, left     Pain in joint of left shoulder      Physician: Lisandra Lazaro PA-C    Physician Orders: Eval and Treat  Medical Diagnosis: Unspecified fracture of lower end of left humerus, initial encounter for closed fracture    Visit # / Visits Authorized:   Insurance Authorization Period: 3/13/2025 to 2025  Date of Evaluation: 3/13/2025  Plan of Care Certification: 3/13/2025 to 2025      Time In: 1530   Time Out:    Total Time (in minutes):     Total Billable Time (in minutes):      FOTO:  Intake Score: 50%  Survey Score 2: 54%  Survey Score 3: 54%    Precautions:       Subjective   Reports smild soreness after OT yesterday. Continues with numbness in ring and small. "Not a lot. It maybe getting better." Reports continued crepitus in lateral elbow, frank when  curling with fa in pronation..    Rates pain at 4/10  at ends of elbow ranges.    Objective            Treatment:  Therapeutic Exercise  TE 1: Supine: :LLPS, gentle PROM: Elbow resting in extension with MHP one the elbow x 10 min with 1 lb cuff weight  TE 2: SUPINE :LLPS, gentle PROM: Elbow Ext witih FA in 3planes: x 10+ ea each plane. SUPINE :LLPS, gentle PROM: Elbow  flex with FA in 3planes: x 10+ ea each plane  TE 3: SUPINE :LLPS, gentle AAROM: Elbow flex/ext,  witih FA in 3planes: x 10+ ea each plane  TE 4: Seated: FA 3 planes, elbow Pre's,  3/10 ea each plane with 5 lb dumbbell,  TE 5: Seated: FA 3 planes, wrist  Pre's, 3/10 ea each plane with 5 lb dumbbell,  TE 7: FA PRE's , 5 lbs, holding on end 3/20  TE 8: Prone, 5 lbs, row 3/10 reps  TE 9: Prone: 3 lbs, elbow ext, 3/10 reps  Therapeutic Activity  TA 2: BTE- 45 sec each of the followin: wrist flex, 504 wrist ext, 504 fa sup, " 504 fa pro, 302 wrist rd, 302 wrist ud 162: 3-jaw pinch (45 sec), 162: lateral pinch (30 sec), 162:  (60 sec)  TA 3: green flexbar: wf/we twists, frowns, smiles, rd, ud x 20 ea    Time Entry(in minutes):       Assessment & Plan   Assessment: Charlie continues to progress nicely.       Patient will continue to benefit from skilled outpatient occupational therapy to address the deficits listed in the problem list box on initial evaluation, provide pt/family education and to maximize pt's level of independence in the home and community environment.     Patient's spiritual, cultural, and educational needs considered and patient agreeable to plan of care and goals.           Plan: Continue per OT POC with focus on return to funcitonal independence    Goals:   Active       Long Term Goals       Charlie will demonstrate significantly improved functional performance from re-assessment as measured by a 20 ncrease in FOTO function score.  (Progressing)       Start:  03/13/25            Patient will achieve left  strength that is 90% that of the .right dominant hand to improve function with ADLs/work/leisure tasks.  (Progressing)       Start:  03/13/25            Patient will report pain 2 out of 10 at worst during use  in IADL's to improve function with ADLs/work/leisure tasks..  (Progressing)       Start:  03/13/25            Patient will demo full fa rom  to improve functional grasp for ADLs/work/leisure tasks.   (Progressing)       Start:  03/13/25            Pt will demo full wrist arom to improve function with ADLs/work/leisure tasks.  (Progressing)       Start:  03/13/25               short term goals       [place] will demonstrate significantly improved functional performance from re-assessment as measured by a 10 point Increase in FOTO function score.  (Progressing)       Start:  03/13/25            Patient will achieve left  strength that is 60 % that of the  right dominant hand to improve function with  ADLs/work/leisure tasks.  (Progressing)       Start:  03/13/25            Patient will report pain 2 out of 10 at worst during use in light ADL's to improve function with ADLs/work/leisure tasks. .  (Progressing)       Start:  03/13/25            Patient will demo 10 to 120 degrees elbow arom for ADLs/work/leisure tasks.   (Progressing)       Start:  03/13/25            Pt will demo full arom FA  to improve function with ADLs/work/leisure tasks.  (Progressing)       Start:  03/13/25                Mariola Durand, OT

## 2025-05-15 ENCOUNTER — CLINICAL SUPPORT (OUTPATIENT)
Dept: REHABILITATION | Facility: HOSPITAL | Age: 45
End: 2025-05-15
Payer: COMMERCIAL

## 2025-05-15 DIAGNOSIS — M25.522 LEFT ELBOW PAIN: Primary | ICD-10-CM

## 2025-05-15 DIAGNOSIS — M25.612 DECREASED RANGE OF MOTION OF SHOULDER, LEFT: Primary | ICD-10-CM

## 2025-05-15 DIAGNOSIS — M25.622 ELBOW STIFFNESS, LEFT: ICD-10-CM

## 2025-05-15 PROCEDURE — 97530 THERAPEUTIC ACTIVITIES: CPT

## 2025-05-15 PROCEDURE — 97112 NEUROMUSCULAR REEDUCATION: CPT

## 2025-05-15 PROCEDURE — 97110 THERAPEUTIC EXERCISES: CPT

## 2025-05-15 NOTE — PROGRESS NOTES
Outpatient Rehab    Occupational Therapy Visit    Patient Name: Charlie Mazariegos Jr.  MRN: [id number]  YOB: 1980  Encounter Date: 5/15/2025    Therapy Diagnosis: No diagnosis found.  Physician: Lisandra Lazaro PA-C    Physician Orders: Eval and Treat  Medical Diagnosis: Unspecified fracture of lower end of left humerus, initial encounter for closed fracture    Visit # / Visits Authorized:   Insurance Authorization Period: 3/13/2025 to 2025  Date of Evaluation: 3/13/2025  Plan of Care Certification: 3/13/2025 to 2025      Time In: 1330   Time Out: 1435  Total Time (in minutes): 65   Total Billable Time (in minutes): 65    FOTO:  Intake Score: 50%  Survey Score 2: 54%  Survey Score 3: 54%    Precautions:       Subjective   Nothing new to report today.    Not rated today.    Objective            Treatment:  Therapeutic Exercise  TE 1: Supine: :LLPS, gentle PROM: Elbow resting in extension with MHP one the elbow x 10 min with 1 lb cuff weight  TE 2: SUPINE :LLPS, gentle PROM: Elbow Ext witih FA in 3planes: x 10+ ea each plane. SUPINE :LLPS, gentle PROM: Elbow  flex with FA in 3planes: x 10+ ea each plane  TE 3: SUPINE :LLPS, gentle AAROM: Elbow flex/ext,  witih FA in 3planes: x 10+ ea each plane  TE 4: Seated: FA 3 planes, elbow Pre's,  3/10 ea each plane with 5 lb dumbbell,  TE 5: Seated: FA 3 planes, wrist  Pre's, 3/10 ea each plane with 5 lb dumbbell,  TE 7: FA PRE's , 5 lbs, holding on end 3/20  TE 8: Prone, 5 lbs, row 3/10 reps  TE 9: Prone: 3 lbs, elbow ext, 3/10 reps  Therapeutic Activity  TA 2: BTE- 45 sec each of the followin: wrist flex, 504 wrist ext, 504 fa sup, 504 fa pro, 302 wrist rd, 302 wrist ud 162: 3-jaw pinch (45 sec), 162: lateral pinch (30 sec), 162:  (60 sec)  TA 3: green flexbar: wf/we twists, frowns, smiles, rd, ud x 20 ea    Time Entry(in minutes):  Therapeutic Activity Time Entry: 15  Therapeutic Exercise Time Entry: 50    Assessment & Plan    Assessment:    Evaluation/Treatment Tolerance: Patient tolerated treatment well    Patient will continue to benefit from skilled outpatient occupational therapy to address the deficits listed in the problem list box on initial evaluation, provide pt/family education and to maximize pt's level of independence in the home and community environment.     Patient's spiritual, cultural, and educational needs considered and patient agreeable to plan of care and goals.           Plan: Continue per OT POC with focus on return to funcitonal independence    Goals:   Active       Long Term Goals       Charlie will demonstrate significantly improved functional performance from re-assessment as measured by a 20 ncrease in FOTO function score.  (Progressing)       Start:  03/13/25            Patient will achieve left  strength that is 90% that of the .right dominant hand to improve function with ADLs/work/leisure tasks.  (Progressing)       Start:  03/13/25            Patient will report pain 2 out of 10 at worst during use  in IADL's to improve function with ADLs/work/leisure tasks..  (Progressing)       Start:  03/13/25            Patient will demo full fa rom  to improve functional grasp for ADLs/work/leisure tasks.   (Progressing)       Start:  03/13/25            Pt will demo full wrist arom to improve function with ADLs/work/leisure tasks.  (Progressing)       Start:  03/13/25               short term goals       [place] will demonstrate significantly improved functional performance from re-assessment as measured by a 10 point Increase in FOTO function score.  (Progressing)       Start:  03/13/25            Patient will achieve left  strength that is 60 % that of the  right dominant hand to improve function with ADLs/work/leisure tasks.  (Progressing)       Start:  03/13/25            Patient will report pain 2 out of 10 at worst during use in light ADL's to improve function with ADLs/work/leisure tasks. .  (Progressing)        Start:  03/13/25            Patient will demo 10 to 120 degrees elbow arom for ADLs/work/leisure tasks.   (Progressing)       Start:  03/13/25            Pt will demo full arom FA  to improve function with ADLs/work/leisure tasks.  (Progressing)       Start:  03/13/25                Mariola Durand OT

## 2025-05-15 NOTE — PROGRESS NOTES
Outpatient Rehab    Physical Therapy Visit    Patient Name: Charlie Mazariegos Jr.  MRN: [id number]  YOB: 1980  Encounter Date: 5/15/2025    Therapy Diagnosis:   Encounter Diagnosis   Name Primary?    Decreased range of motion of shoulder, left Yes     Physician: Lisandra Lazaro PA-C    Physician Orders: Eval and Treat  Medical Diagnosis: Pain in left shoulder    Visit # / Visits Authorized:  1 / 20  Insurance Authorization Period: 5/8/2025 to 12/31/2025  Date of Evaluation: 5/12/2025  Plan of Care Certification: 5/12/2025 to 7/25/2025      PT/PTA:     Number of PTA visits since last PT visit:   Time In: 1300   Time Out: 1359  Total Time (in minutes): 59   Total Billable Time (in minutes): 59    FOTO:  Intake Score:  %  Survey Score 2:  %  Survey Score 3:  %    Precautions:       Subjective   no new complaints..  Pain reported as 0/10. L shoulder    Objective            Treatment:  Balance/Neuromuscular Re-Education  NMR 3: sidelying shoulder flexion, abduction, ER: 4x10 each 3 lbs  NMR 4: serratus punches: 6 lbs 4x10; landmines 4x10 10 lbs  NMR 5: prone row: 8 lbs 4x10  NMR 6: star pattern: yellow pb band 2x10  NMR 7: ER/ serratus perturbations: 30 seconds, 4x    Time Entry(in minutes):  Neuromuscular Re-Education Time Entry: 59    Assessment & Plan   Assessment: Emphasis on RTC strength, motor control and endurance. Excellent tolerate for therapetutic interventions. Will continue to progress as tolerated.  Evaluation/Treatment Tolerance: Patient tolerated treatment well    Patient will continue to benefit from skilled outpatient physical therapy to address the deficits listed in the problem list box on initial evaluation, provide pt/family education and to maximize pt's level of independence in the home and community environment.     Patient's spiritual, cultural, and educational needs considered and patient agreeable to plan of care and goals.           Plan: Progress as tolerated per POC  goals.    Goals:   Active       long term goals       Pt will improve FOTO score to </= 20% to demonstrate improvements in functional mobility including carrying, moving, and handling objects  (Progressing)       Start:  05/12/25    Expected End:  07/25/25            Pt will improve impaired shoulder MMTs 1 grade B to improve strength for household duties.  (Progressing)       Start:  05/12/25    Expected End:  07/25/25            Pt will demonstrate improved perscapular strength and endurance to show improved OH mobility and activity tolerance. (Progressing)       Start:  05/12/25    Expected End:  07/25/25               short term goals       Pt will be independent with HEP to assist PT treatment in restoring pain free motion of the L shoulder. (Progressing)       Start:  05/12/25    Expected End:  06/13/25            Pt will improve impaired shoulder MMTs 1/2 grade B to improve strength for functional tasks.  (Progressing)       Start:  05/12/25    Expected End:  06/13/25            Pt will demonstrate improved postural awareness requiring less than 3 VC during therapeutic activity.  (Progressing)       Start:  05/12/25    Expected End:  06/13/25                Lupe Lemus, PT, DPT, OCS

## 2025-05-19 ENCOUNTER — PROCEDURE VISIT (OUTPATIENT)
Dept: NEUROLOGY | Facility: CLINIC | Age: 45
End: 2025-05-19
Payer: COMMERCIAL

## 2025-05-19 ENCOUNTER — CLINICAL SUPPORT (OUTPATIENT)
Dept: REHABILITATION | Facility: HOSPITAL | Age: 45
End: 2025-05-19
Payer: COMMERCIAL

## 2025-05-19 ENCOUNTER — TELEPHONE (OUTPATIENT)
Dept: ORTHOPEDICS | Facility: CLINIC | Age: 45
End: 2025-05-19
Payer: COMMERCIAL

## 2025-05-19 DIAGNOSIS — M25.522 LEFT ELBOW PAIN: ICD-10-CM

## 2025-05-19 DIAGNOSIS — M25.522 LEFT ELBOW PAIN: Primary | ICD-10-CM

## 2025-05-19 DIAGNOSIS — M25.622 ELBOW STIFFNESS, LEFT: ICD-10-CM

## 2025-05-19 DIAGNOSIS — M25.512 PAIN IN JOINT OF LEFT SHOULDER: ICD-10-CM

## 2025-05-19 PROCEDURE — 95886 MUSC TEST DONE W/N TEST COMP: CPT | Mod: S$GLB,,, | Performed by: PHYSICAL MEDICINE & REHABILITATION

## 2025-05-19 PROCEDURE — 97530 THERAPEUTIC ACTIVITIES: CPT | Mod: CO

## 2025-05-19 PROCEDURE — 97110 THERAPEUTIC EXERCISES: CPT | Mod: CO

## 2025-05-19 PROCEDURE — 95909 NRV CNDJ TST 5-6 STUDIES: CPT | Mod: S$GLB,,, | Performed by: PHYSICAL MEDICINE & REHABILITATION

## 2025-05-19 NOTE — PROCEDURES
Test Date:  2025    Patient: charlie mazariegos : 1980 Physician: Salomón Camargo D.O.   ID#: [id number] Sex: Male Ref. Phys: Lisandra Lazaro PA-C     HPI: Charlie Mazariegos Jr. is a 44 y.o.male who presents for NCS/EMG to evaluate for left ulnar neuropathy.    PROCEDURE:  Prior to the procedure, the procedure was discussed in detail with the patient.  All questions were answered, and verbal consent was obtained.  For nerve conduction studies, a combination of surface electrodes, bar electrodes, and/or ring electrodes were used as needed.  For needle EMG, each site was cleaned and prepped in usual fashion with an alcohol pad.  A monopolar needle (28G) was used.  There was no significant bleeding, and bandages were applied as needed.  The procedure was tolerated without adverse effect.  The patient was instructed on post-procedure care including ice if needed for 10-15 minutes up to 4 times/day for any sore muscles.  I discussed with the patient that the data would be reviewed and a report sent to the referring provider, where any follow up questions regarding next steps should be directed.        NCV & EMG Findings:  All nerve conduction studies (as indicated in the following tables) were within normal limits.    All examined muscles (as indicated in the following table) showed no evidence of electrical instability.      IMPRESSIONS:  This is a normal electrophysiologic study of the left upper extremity.          ___________________________  Salomón Camargo D.O.        NCS+  Motor Nerve Results      Latency Amplitude F-Lat Segment Distance CV Comment   Site (ms) Norm (mV) Norm (ms)  (cm) (m/s) Norm    Left Median (APB)   Wrist 3.4  < 4.6 9.1  > 4.2         Elbow 8.2 - 8.9 -  Elbow-Wrist 25 53  > 47    Left Ulnar (ADM)   Wrist 3.0  < 3.7 12.2  > 3.0         Bel Elbow 7.8 - 8.6 -  Bel Elbow-Wrist 27 56  > 52    Abv Elbow 9.0 - 8.8 -  Abv Elbow-Bel Elbow 9 75  > 43    Left Ulnar (FDI)   Wrist 4.3 -  15.7 -         Bel Elbow 8.6 - 8.9 -  Bel Elbow-Wrist 27 63  > 52    Abv Elbow 10.1 - 9.5 -  Abv Elbow-Bel Elbow 9 60  > 43      Sensory Sites      Latency (O) Latency (P) Amp (P-P) Segment Distance Velocity Comment   Site (ms) Norm (ms) Norm (µV) Norm  (cm) (m/s)    Left Median (Rec:Dig II)   Wrist 2.7  < 3.3 3.4  < 4.0 48  > 13 Wrist-Dig II 14 53    Left Ulnar (Rec:Dig V)   Wrist 2.3  < 3.1 3.1  < 4.0 11  > 8 Wrist-Dig V 14 60    Left Radial (Rec:Wrist)   Forearm 1.65  < 2.2 2.3  < 2.8 20  > 11 Forearm-Wrist 10 61      EMG+     Side Muscle Nerve Root Ins Act Fibs Psw Amp Dur Poly Recrt Int Pat Comment   Left Deltoid Axillary C5-C6 Nml Nml Nml Nml Nml 0 Nml Nml    Left Biceps Musculocut C5-C6 Nml Nml Nml Nml Nml 0 Nml Nml    Left Triceps Radial C6-C8 Nml Nml Nml Nml Nml 0 Nml Nml    Left FDI Ulnar,  Radial C8-T1 Nml Nml Nml Nml Nml 0 Nml Nml    Left ADM Ulnar C8-T1 Nml Nml Nml Nml Nml 0 Nml Nml            Waveforms:    Motor       Sensory

## 2025-05-19 NOTE — PROGRESS NOTES
Outpatient Rehab    Occupational Therapy Visit    Patient Name: Charlie Mazariegos Jr.  MRN: [id number]  YOB: 1980  Encounter Date: 2025    Therapy Diagnosis:   Encounter Diagnoses   Name Primary?    Left elbow pain Yes    Elbow stiffness, left     Pain in joint of left shoulder      Physician: Lisandra Lazaro PA-C    Physician Orders: Eval and Treat  Medical Diagnosis: Unspecified fracture of lower end of left humerus, initial encounter for closed fracture    Visit # / Visits Authorized:   Insurance Authorization Period: 3/13/2025 to 2025  Date of Evaluation: 3/13/2025  Plan of Care Certification: 3/13/2025 to 2025      Time In: 1546   Time Out: 1642  Total Time (in minutes): 56   Total Billable Time (in minutes):      FOTO:  Intake Score:  %  Survey Score 2:  %  Survey Score 3:  %    Precautions:       Subjective   had a nerve study, was told the numbness will get better with time.         Objective            Treatment:  Therapeutic Exercise  TE 1: Supine: :LLPS, gentle PROM: Elbow resting in extension with MHP one the elbow x 10 min with 1 lb cuff weight  TE 2: SUPINE :LLPS, gentle PROM: Elbow Ext witih FA in 3planes: x 10+ ea each plane. SUPINE :LLPS, gentle PROM: Elbow  flex with FA in 3planes: x 10+ ea each plane  TE 4: Seated: FA 3 planes, elbow Pre's,  3/10 ea each plane with 5 lb dumbbell,  TE 6: Arm bike: level 2, 3 min each way for rom and conditioning.  TE 7: FA PRE's , 5 lbs, holding on end 3/20  TE 8: Prone, 5 lbs, row and shoulder ext 3/10 reps  TE 9: Prone: 5 lbs, elbow ext, 2/10 reps  TE 10: supine OH tricep dips with 3# dowel, FAs supinated 2 x 10  Therapeutic Activity  TA 2: BTE- 45 sec each of the followin: wrist flex, 504 wrist ext, 504 fa sup, 504 fa pro, 302 wrist rd, 302 wrist ud 162: 3-jaw pinch (45 sec), 162: lateral pinch (30 sec), 162:  (60 sec)    Time Entry(in minutes):  Therapeutic Activity Time Entry: 15  Therapeutic Exercise Time  Entry: 41    Assessment & Plan   Assessment: Charlie continues to progress nicely.  Evaluation/Treatment Tolerance: Patient tolerated treatment well    Patient will continue to benefit from skilled outpatient occupational therapy to address the deficits listed in the problem list box on initial evaluation, provide pt/family education and to maximize pt's level of independence in the home and community environment.     Patient's spiritual, cultural, and educational needs considered and patient agreeable to plan of care and goals.           Plan: Continue per OT POC with focus on return to funcitonal independence    Goals:   Active       Long Term Goals       Charlie will demonstrate significantly improved functional performance from re-assessment as measured by a 20 ncrease in FOTO function score.  (Progressing)       Start:  03/13/25            Patient will achieve left  strength that is 90% that of the .right dominant hand to improve function with ADLs/work/leisure tasks.  (Progressing)       Start:  03/13/25            Patient will report pain 2 out of 10 at worst during use  in IADL's to improve function with ADLs/work/leisure tasks..  (Progressing)       Start:  03/13/25            Patient will demo full fa rom  to improve functional grasp for ADLs/work/leisure tasks.   (Progressing)       Start:  03/13/25            Pt will demo full wrist arom to improve function with ADLs/work/leisure tasks.  (Progressing)       Start:  03/13/25               short term goals       [place] will demonstrate significantly improved functional performance from re-assessment as measured by a 10 point Increase in FOTO function score.  (Progressing)       Start:  03/13/25            Patient will achieve left  strength that is 60 % that of the  right dominant hand to improve function with ADLs/work/leisure tasks.  (Progressing)       Start:  03/13/25            Patient will report pain 2 out of 10 at worst during use in light ADL's to  improve function with ADLs/work/leisure tasks. .  (Progressing)       Start:  03/13/25            Patient will demo 10 to 120 degrees elbow arom for ADLs/work/leisure tasks.   (Progressing)       Start:  03/13/25            Pt will demo full arom FA  to improve function with ADLs/work/leisure tasks.  (Progressing)       Start:  03/13/25                  PEDRO Dawson/DAXA

## 2025-05-20 ENCOUNTER — CLINICAL SUPPORT (OUTPATIENT)
Dept: REHABILITATION | Facility: HOSPITAL | Age: 45
End: 2025-05-20
Payer: COMMERCIAL

## 2025-05-20 ENCOUNTER — DOCUMENTATION ONLY (OUTPATIENT)
Dept: ORTHOPEDICS | Facility: CLINIC | Age: 45
End: 2025-05-20

## 2025-05-20 ENCOUNTER — HOSPITAL ENCOUNTER (OUTPATIENT)
Dept: RADIOLOGY | Facility: HOSPITAL | Age: 45
Discharge: HOME | End: 2025-05-20
Attending: ORTHOPAEDIC SURGERY
Payer: COMMERCIAL

## 2025-05-20 ENCOUNTER — OFFICE VISIT (OUTPATIENT)
Dept: ORTHOPEDICS | Facility: CLINIC | Age: 45
End: 2025-05-20
Payer: COMMERCIAL

## 2025-05-20 VITALS — BODY MASS INDEX: 31.43 KG/M2 | HEIGHT: 74 IN | WEIGHT: 244.94 LBS

## 2025-05-20 DIAGNOSIS — M25.522 LEFT ELBOW PAIN: Primary | ICD-10-CM

## 2025-05-20 DIAGNOSIS — M25.512 PAIN IN JOINT OF LEFT SHOULDER: ICD-10-CM

## 2025-05-20 DIAGNOSIS — M25.612 DECREASED RANGE OF MOTION OF SHOULDER, LEFT: Primary | ICD-10-CM

## 2025-05-20 DIAGNOSIS — S42.402A CLOSED FRACTURE OF LEFT ELBOW, INITIAL ENCOUNTER: ICD-10-CM

## 2025-05-20 DIAGNOSIS — M25.622 ELBOW STIFFNESS, LEFT: ICD-10-CM

## 2025-05-20 DIAGNOSIS — S42.402A CLOSED FRACTURE OF LEFT ELBOW, INITIAL ENCOUNTER: Primary | ICD-10-CM

## 2025-05-20 PROCEDURE — 99999 PR PBB SHADOW E&M-EST. PATIENT-LVL III: CPT | Mod: PBBFAC,,, | Performed by: ORTHOPAEDIC SURGERY

## 2025-05-20 PROCEDURE — 99024 POSTOP FOLLOW-UP VISIT: CPT | Mod: S$GLB,,, | Performed by: ORTHOPAEDIC SURGERY

## 2025-05-20 PROCEDURE — 1159F MED LIST DOCD IN RCRD: CPT | Mod: CPTII,S$GLB,, | Performed by: ORTHOPAEDIC SURGERY

## 2025-05-20 PROCEDURE — 97530 THERAPEUTIC ACTIVITIES: CPT

## 2025-05-20 PROCEDURE — 73080 X-RAY EXAM OF ELBOW: CPT | Mod: TC,LT

## 2025-05-20 PROCEDURE — 73080 X-RAY EXAM OF ELBOW: CPT | Mod: 26,LT,, | Performed by: RADIOLOGY

## 2025-05-20 PROCEDURE — 97110 THERAPEUTIC EXERCISES: CPT

## 2025-05-20 PROCEDURE — 97112 NEUROMUSCULAR REEDUCATION: CPT

## 2025-05-20 NOTE — PROGRESS NOTES
"Charlie Tyler Caiteresita Mckinley presents for post-operative evaluation.  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 Euceda on February 26, 2025.  He reports he is not having any pain.  He has been having some decreased light touch sensation to the left ulnar nerve distribution which he feels may be starting to improve at this point in time.  He notes that his sensation "definitely "has improved to the fingers though certainly has not normalized.  He had an EMG returns for those results and re-evaluation today.  Still having some mild motion deficits with terminal elbow flexion and extension.      PE:  AA&O x 4.  NAD  HEENT:  NCAT, sclera nonicteric  Lungs:  Respirations are equal and unlabored.  CV:  2+ bilateral upper and lower extremity pulses.  MSK: The wound is healing well with no signs of erythema or warmth.  There is no drainage.  No clinical signs or symptoms of infection are present.   Full painless range motion left hand.  Ulnar motor strength is strong and full today throughout the left hand.  He has slightly decreased light touch sensation to the left ulnar nerve distribution though he does note that he has some light touch sensation intact to the small digit and ulnar border of the ring finger.  He can cross the 2nd and 3rd digits and has 5/5 motor strength with digital abduction and adduction.   Elbow flexion is to 140, extension has an a proximally 25 degree lag with terminal elbow extension.    FINDINGS:    X-rays patient's left elbow demonstrate expected postop changes with no complications    EMG: Normal study    A/P: Status post above, doing well  1)  at this point in time the patient may progress to strengthening and activities as tolerated left upper extremity.  I will incorporate a dynamic " extension splint into his rehabilitation program for assistance with terminal elbow extension.  Continue therapy.  Follow up in 6 weeks with new left elbow x-rays.  Maintain light duty status at work.   We will continue to observe his ulnar nerve paresthesias as they are subjectively improving per patient's report and he has a completely normal EMG recently performed.

## 2025-05-22 ENCOUNTER — CLINICAL SUPPORT (OUTPATIENT)
Dept: REHABILITATION | Facility: HOSPITAL | Age: 45
End: 2025-05-22
Payer: COMMERCIAL

## 2025-05-22 DIAGNOSIS — M25.522 LEFT ELBOW PAIN: Primary | ICD-10-CM

## 2025-05-22 DIAGNOSIS — M25.622 ELBOW STIFFNESS, LEFT: ICD-10-CM

## 2025-05-22 DIAGNOSIS — M25.512 PAIN IN JOINT OF LEFT SHOULDER: ICD-10-CM

## 2025-05-22 PROCEDURE — 97530 THERAPEUTIC ACTIVITIES: CPT

## 2025-05-22 PROCEDURE — 97110 THERAPEUTIC EXERCISES: CPT

## 2025-05-22 NOTE — PROGRESS NOTES
Outpatient Rehab    Occupational Therapy Visit    Patient Name: Charlie Mazariegos Jr.  MRN: [id number]  YOB: 1980  Encounter Date: 5/22/2025    Therapy Diagnosis:   Encounter Diagnoses   Name Primary?    Left elbow pain Yes    Elbow stiffness, left     Pain in joint of left shoulder      Physician: Lisandra Lazaro PA-C    Physician Orders: Eval and Treat  Medical Diagnosis: Unspecified fracture of lower end of left humerus, initial encounter for closed fracture    Visit # / Visits Authorized: 24 / 32  Insurance Authorization Period: 3/13/2025 to 12/31/2025  Date of Evaluation: 3/13/2025  Plan of Care Certification: 3/13/2025 to 6/5/2025      Time In: 1520   Time Out: 1620  Total Time (in minutes): 60   Total Billable Time (in minutes): 60    FOTO:  Intake Score: 50%  Survey Score 2: 58%  Survey Score 3: 54%    Precautions:       Subjective   Awaiting dynasplint.    Not rated today.    Objective     Elbow/Forearm Range of Motion   Left Elbow/Forearm   Active (deg) Passive (deg) Pain   Flexion 125       Extension -28       Forearm Pronation         Forearm Supination                          Right  Strength  Right Hand Dynamometer Position: 2  Elbow Position Forearm Position Trial 1 (lbs) Trial 2  (lbs) Trial 3  (lbs) Average  (lbs) Pain   Flexed Neutral 106               Left  Strength  Left Hand Dynamometer Position: 2  Elbow Position Forearm Position Trial 1 (lbs) Trial 2 (lbs) Trial 3 (lbs) Average (lbs) Pain   Flexed Neutral 67                          Treatment:  Therapeutic Exercise  TE 1: Seated: :LLPS, gentle PROM: Elbow resting in extension with MHP one the elbow x 10 min with 1.5 lb cuff weight  TE 2: Seated: :LLPS, gentle PROM: Elbow Extensions 3 planes,  TE 3: Seated AAROM FLex x 10 reps  TE 4: Seated: FA 3 planes, elbow Pre's,  3/10 ea each plane with 7 lb dumbbell,  TE 5: Seated: FA 3 planes, wrist  Pre's, 3/10 ea each plane with 7 lb dumbbell,  TE 7: FA PRE's , 7 lbs, holding  on end   TE 8: Prone, 5 lbs, row and shoulder ext 3/10 reps  TE 9: Prone: 5 lbs, elbow ext, 2/10 reps  TE 10: 12 lb cattle bell carry at side x 3 min to promote ext.  Therapeutic Activity  TA 2: BTE- 45 sec each of the followin: wrist flex, 504 wrist ext, 504 fa sup, 504 fa pro, 302 wrist rd, 302 wrist ud 162: 3-jaw pinch (45 sec), 162: lateral pinch (30 sec), 162:  (60 sec)  TA 3: Green flexbar twisting: FA sup, fa pro, wrist flex, wrist ext, wrist rd, wrist ud 20 reps each    Time Entry(in minutes):  Therapeutic Activity Time Entry: 20  Therapeutic Exercise Time Entry: 40    Assessment & Plan   Assessment: Reassessment reveals no change on elbow arom. However, after LLPS and heat, elbow ext improved to (-12) degress, indicating potential of the joint. Chnaged treatment today some to emphasize elbow ext further.  Evaluation/Treatment Tolerance: Patient tolerated treatment well    The patient will continue to benefit from skilled outpatient occupational therapy in order to address the deficits listed in the problem list on the initial evaluation, provide patient and family education, and maximize the patients level of independence in the home and community environments.     The patient's spiritual, cultural, and educational needs were considered, and the patient is agreeable to the plan of care and goals.           Plan: Continue per OT POC with focus on return to funcitonal independence. Await delivery of rosa splint to patient .    Goals:   Active       Long Term Goals       Charlie will demonstrate significantly improved functional performance from re-assessment as measured by a 20 ncrease in FOTO function score.  (Progressing)       Start:  25    Expected End:  25            Patient will achieve left  strength that is 90% that of the .right dominant hand to improve function with ADLs/work/leisure tasks.  (Progressing)       Start:  25    Expected End:  25             Patient will report pain 2 out of 10 at worst during use  in IADL's to improve function with ADLs/work/leisure tasks..  (Progressing)       Start:  03/13/25    Expected End:  06/05/25            Patient will demo full fa rom  to improve functional grasp for ADLs/work/leisure tasks.   (Met)       Start:  03/13/25    Expected End:  06/05/25    Resolved:  05/20/25         Pt will demo full wrist arom to improve function with ADLs/work/leisure tasks.  (Met)       Start:  03/13/25    Expected End:  06/05/25    Resolved:  05/20/25            short term goals       Charlie will demonstrate significantly improved functional performance from re-assessment as measured by a 10 point Increase in FOTO function score.  (Progressing)       Start:  03/13/25    Expected End:  06/05/25            Patient will achieve left  strength that is 60 % that of the  right dominant hand to improve function with ADLs/work/leisure tasks.  (Progressing)       Start:  03/13/25    Expected End:  06/05/25            Patient will report pain 2 out of 10 at worst during use in light ADL's to improve function with ADLs/work/leisure tasks. .  (Progressing)       Start:  03/13/25    Expected End:  06/05/25            Patient will demo 10 to 120 degrees elbow arom for ADLs/work/leisure tasks.   (Progressing)       Start:  03/13/25    Expected End:  06/05/25            Pt will demo full arom FA  to improve function with ADLs/work/leisure tasks.  (Progressing)       Start:  03/13/25    Expected End:  06/05/25                Mariola Durand OT

## 2025-05-23 NOTE — PROGRESS NOTES
Outpatient Rehab    Physical Therapy Visit    Patient Name: Charlie Mazariegos Jr.  MRN: [id number]  YOB: 1980  Encounter Date: 5/20/2025    Therapy Diagnosis:   Encounter Diagnosis   Name Primary?    Decreased range of motion of shoulder, left Yes     Physician: Lisandra Lazaro PA-C    Physician Orders: Eval and Treat  Medical Diagnosis: Pain in left shoulder    Visit # / Visits Authorized:  2 / 20  Insurance Authorization Period: 5/8/2025 to 12/31/2025  Date of Evaluation: 5/12/2025  Plan of Care Certification: 5/12/2025 to 7/25/2025      PT/PTA:     Number of PTA visits since last PT visit:   Time In: 1100   Time Out: 1202  Total Time (in minutes): 62   Total Billable Time (in minutes): 62    FOTO:  Intake Score:  %  Survey Score 2:  %  Survey Score 3:  %    Precautions:       Subjective   no new complaints; reports he was not overly sore from last visit..  Pain reported as 0/10.      Objective            Treatment:  Balance/Neuromuscular Re-Education  NMR 3: sidelying shoulder flexion, abduction, ER: 4x10 each 3 lbs  NMR 4: serratus punches: 8lbs 4x10; landmines 4x10 10 lbs  NMR 5: prone row: 8 lbs 4x10  NMR 6: star pattern: yellow pb band 2x10  NMR 7: ER/ serratus perturbations: 30 seconds, 4x  NMR 8: held  Therapeutic Activity  TA 1: CC Y and T pull downs: 2x8 15 lbs    Time Entry(in minutes):  Neuromuscular Re-Education Time Entry: 54  Therapeutic Activity Time Entry: 8    Assessment & Plan   Assessment: Emphasis on RTC strength, motor control and endurance. Excellent tolerance for today's progressions.  Evaluation/Treatment Tolerance: Patient tolerated treatment well    The patient will continue to benefit from skilled outpatient physical therapy in order to address the deficits listed in the problem list on the initial evaluation, provide patient and family education, and maximize the patients level of independence in the home and community environments.     The patient's spiritual,  cultural, and educational needs were considered, and the patient is agreeable to the plan of care and goals.           Plan: Progress as tolerated per POC goals.    Goals:   Active       long term goals       Pt will improve FOTO score to </= 20% to demonstrate improvements in functional mobility including carrying, moving, and handling objects  (Progressing)       Start:  05/12/25    Expected End:  07/25/25            Pt will improve impaired shoulder MMTs 1 grade B to improve strength for household duties.  (Progressing)       Start:  05/12/25    Expected End:  07/25/25            Pt will demonstrate improved perscapular strength and endurance to show improved OH mobility and activity tolerance. (Progressing)       Start:  05/12/25    Expected End:  07/25/25               short term goals       Pt will be independent with HEP to assist PT treatment in restoring pain free motion of the L shoulder. (Progressing)       Start:  05/12/25    Expected End:  06/13/25            Pt will improve impaired shoulder MMTs 1/2 grade B to improve strength for functional tasks.  (Progressing)       Start:  05/12/25    Expected End:  06/13/25            Pt will demonstrate improved postural awareness requiring less than 3 VC during therapeutic activity.  (Progressing)       Start:  05/12/25    Expected End:  06/13/25                Lupe Lemus, PT, DPT, OCS

## 2025-05-26 ENCOUNTER — CLINICAL SUPPORT (OUTPATIENT)
Dept: REHABILITATION | Facility: HOSPITAL | Age: 45
End: 2025-05-26
Payer: COMMERCIAL

## 2025-05-26 DIAGNOSIS — M25.522 LEFT ELBOW PAIN: ICD-10-CM

## 2025-05-26 DIAGNOSIS — M25.622 ELBOW STIFFNESS, LEFT: Primary | ICD-10-CM

## 2025-05-26 PROCEDURE — 97110 THERAPEUTIC EXERCISES: CPT

## 2025-05-26 PROCEDURE — 97530 THERAPEUTIC ACTIVITIES: CPT

## 2025-05-26 NOTE — PROGRESS NOTES
Outpatient Rehab    Occupational Therapy Visit  Dictation #1  MRN:[id number]  CSN:[id number]   Patient Name: Charlie Mazariegos Jr.  MRN: [id number]  YOB: 1980  Encounter Date: 2025    Therapy Diagnosis: No diagnosis found.  Physician: Lisandra Lazaro PA-C    Physician Orders: Eval and Treat  Medical Diagnosis: Unspecified fracture of lower end of left humerus, initial encounter for closed fracture    Visit # / Visits Authorized:   Insurance Authorization Period: 3/13/2025 to 2025  Date of Evaluation: 3/13/2025  Plan of Care Certification: 3/13/2025 to 2025      Time In: 1520   Time Out: 1625  Total Time (in minutes): 65   Total Billable Time (in minutes): 60    FOTO:  Intake Score: 50%  Survey Score 2: 58%  Survey Score 3: 54%    Precautions:       Subjective   Awaiting dynasplint still..    Not rated today.    Objective            Treatment:  Therapeutic Exercise  TE 1: Seated: :LLPS, gentle PROM: Elbow resting in extension with MHP one the elbow x 10 min with 1.5 lb cuff weight  TE 2: Seated: :LLPS, gentle PROM: Elbow Extension 3 planes,  TE 3: Supine :LLPS, gentle PROM: Elbow Extension 3 planes,  TE 4: Seated: FA 3 planes, elbow Pre's,  3/10 ea each plane with 7 lb dumbbell,  TE 5: Seated: FA 3 planes, wrist  Pre's, 3/10 ea each plane with 7 lb dumbbell,  TE 8: Prone, 10 lbs, row and shoulder ext 3/10 reps  TE 9: Prone: 5 lbs, elbow ext, 2/10 reps  TE 10: 20 lb kettle bell carry at side x 3 min to promote ext.  Therapeutic Activity  TA 2: BTE- 45 sec each of the followin: wrist flex, 504 wrist ext, 504 fa sup, 504 fa pro, 302 wrist rd, 302 wrist ud 162: 3-jaw pinch (45 sec), 162: lateral pinch (30 sec), 162:  (60 sec)    Time Entry(in minutes):  Therapeutic Activity Time Entry: 15  Therapeutic Exercise Time Entry: 45    Assessment & Plan   Assessment: Elbow responds very well to heat and stretch with notable improvement. Increased resistance in PRE's today and pt  denied pain.  Evaluation/Treatment Tolerance: Patient tolerated treatment well    The patient will continue to benefit from skilled outpatient occupational therapy in order to address the deficits listed in the problem list on the initial evaluation, provide patient and family education, and maximize the patients level of independence in the home and community environments.     The patient's spiritual, cultural, and educational needs were considered, and the patient is agreeable to the plan of care and goals.           Plan: Continue per OT POC with focus on return to funcitonal independence. Await delivery of rosa splint to patient .    Goals:   Active       Long Term Goals       [place] will demonstrate significantly improved functional performance from re-assessment as measured by a 20 ncrease in FOTO function score.  (Progressing)       Start:  03/13/25    Expected End:  06/05/25            Patient will achieve left  strength that is 90% that of the .right dominant hand to improve function with ADLs/work/leisure tasks.  (Progressing)       Start:  03/13/25    Expected End:  06/05/25            Patient will report pain 2 out of 10 at worst during use  in IADL's to improve function with ADLs/work/leisure tasks..  (Progressing)       Start:  03/13/25    Expected End:  06/05/25            Patient will demo full fa rom  to improve functional grasp for ADLs/work/leisure tasks.   (Met)       Start:  03/13/25    Expected End:  06/05/25    Resolved:  05/20/25         Pt will demo full wrist arom to improve function with ADLs/work/leisure tasks.  (Met)       Start:  03/13/25    Expected End:  06/05/25    Resolved:  05/20/25            short term goals       Charlie will demonstrate significantly improved functional performance from re-assessment as measured by a 10 point Increase in FOTO function score.  (Progressing)       Start:  03/13/25    Expected End:  06/05/25            Patient will achieve left  strength that  is 60 % that of the  right dominant hand to improve function with ADLs/work/leisure tasks.  (Progressing)       Start:  03/13/25    Expected End:  06/05/25            Patient will report pain 2 out of 10 at worst during use in light ADL's to improve function with ADLs/work/leisure tasks. .  (Progressing)       Start:  03/13/25    Expected End:  06/05/25            Patient will demo 10 to 120 degrees elbow arom for ADLs/work/leisure tasks.   (Progressing)       Start:  03/13/25    Expected End:  06/05/25            Pt will demo full arom FA  to improve function with ADLs/work/leisure tasks.  (Progressing)       Start:  03/13/25    Expected End:  06/05/25                Mariola Durand OT

## 2025-05-27 ENCOUNTER — CLINICAL SUPPORT (OUTPATIENT)
Dept: REHABILITATION | Facility: HOSPITAL | Age: 45
End: 2025-05-27
Payer: COMMERCIAL

## 2025-05-27 DIAGNOSIS — M25.622 ELBOW STIFFNESS, LEFT: ICD-10-CM

## 2025-05-27 DIAGNOSIS — M25.612 DECREASED RANGE OF MOTION OF SHOULDER, LEFT: Primary | ICD-10-CM

## 2025-05-27 DIAGNOSIS — M25.522 LEFT ELBOW PAIN: Primary | ICD-10-CM

## 2025-05-27 DIAGNOSIS — M25.512 PAIN IN JOINT OF LEFT SHOULDER: ICD-10-CM

## 2025-05-27 PROCEDURE — 97530 THERAPEUTIC ACTIVITIES: CPT

## 2025-05-27 PROCEDURE — 97112 NEUROMUSCULAR REEDUCATION: CPT

## 2025-05-27 PROCEDURE — 97110 THERAPEUTIC EXERCISES: CPT

## 2025-05-27 NOTE — PROGRESS NOTES
Outpatient Rehab    Physical Therapy Visit    Patient Name: Charlie Mazariegos Jr.  MRN: [id number]  YOB: 1980  Encounter Date: 5/27/2025    Therapy Diagnosis:   Encounter Diagnosis   Name Primary?    Decreased range of motion of shoulder, left Yes     Physician: Lisandra Lazaro PA-C    Physician Orders: Eval and Treat  Medical Diagnosis: Pain in left shoulder    Visit # / Visits Authorized:  3 / 20  Insurance Authorization Period: 5/8/2025 to 12/31/2025  Date of Evaluation: 5/12/2025  Plan of Care Certification: 5/12/2025 to 7/25/2025      PT/PTA:     Number of PTA visits since last PT visit:   Time In: 1400   Time Out: 1501  Total Time (in minutes): 61   Total Billable Time (in minutes): 61    FOTO:  Intake Score:  %  Survey Score 2:  %  Survey Score 3:  %    Precautions:       Subjective   doing well today, no new complaints; notes he feels his elbow is gaining better ROM. reports his shoulder has been feeling better..  Pain reported as 2/10. left shoulder    Objective            Treatment:  Balance/Neuromuscular Re-Education  NMR 5: prone row: 5 lbs 4x10  NMR 6: star pattern: yellow pb band 2x10 (green 5x)  NMR 7: ER/ serratus perturbations: 30 seconds, 4x  Therapeutic Activity  TA 1: CC Y and T pull downs: 2x8 15 lbs for reaching and pulling tasks  TA 2: standing Y and T with green tband 3x10 each reaching tasks  TA 3: seated landmoines: 20 lbs 4x10 each for pushing, pulling and reaching    Time Entry(in minutes):  Neuromuscular Re-Education Time Entry: 23  Therapeutic Activity Time Entry: 38    Assessment & Plan   Assessment: Progressed to perform therapeutic activities addressing pulling, pushing and reaching. Good tolerance overall with adequate fatigue achieved.  Evaluation/Treatment Tolerance: Patient tolerated treatment well    The patient will continue to benefit from skilled outpatient physical therapy in order to address the deficits listed in the problem list on the initial  evaluation, provide patient and family education, and maximize the patients level of independence in the home and community environments.     The patient's spiritual, cultural, and educational needs were considered, and the patient is agreeable to the plan of care and goals.           Plan: Progress as tolerated per POC goals.    Goals:   Active       long term goals       Pt will improve FOTO score to </= 20% to demonstrate improvements in functional mobility including carrying, moving, and handling objects  (Progressing)       Start:  05/12/25    Expected End:  07/25/25            Pt will improve impaired shoulder MMTs 1 grade B to improve strength for household duties.  (Progressing)       Start:  05/12/25    Expected End:  07/25/25            Pt will demonstrate improved perscapular strength and endurance to show improved OH mobility and activity tolerance. (Progressing)       Start:  05/12/25    Expected End:  07/25/25               short term goals       Pt will be independent with HEP to assist PT treatment in restoring pain free motion of the L shoulder. (Progressing)       Start:  05/12/25    Expected End:  06/13/25            Pt will improve impaired shoulder MMTs 1/2 grade B to improve strength for functional tasks.  (Progressing)       Start:  05/12/25    Expected End:  06/13/25            Pt will demonstrate improved postural awareness requiring less than 3 VC during therapeutic activity.  (Progressing)       Start:  05/12/25    Expected End:  06/13/25                Lupe Lemus, PT, DPT, OCS

## 2025-05-27 NOTE — PROGRESS NOTES
Outpatient Rehab    Occupational Therapy Visit    Patient Name: Charlie Mazariegos Jr.  MRN: [id number]  YOB: 1980  Encounter Date: 5/27/2025    Therapy Diagnosis:   Encounter Diagnoses   Name Primary?    Left elbow pain Yes    Elbow stiffness, left     Pain in joint of left shoulder      Physician: Lisandra Laazro PA-C    Physician Orders: Eval and Treat  Medical Diagnosis: Unspecified fracture of lower end of left humerus, initial encounter for closed fracture    Visit # / Visits Authorized: 26 / 32  Insurance Authorization Period: 3/13/2025 to 12/31/2025  Date of Evaluation: 3/13/2025  Plan of Care Certification: 3/13/2025 to 6/5/2025      Time In: 1530   Time Out: 1625  Total Time (in minutes): 55   Total Billable Time (in minutes): 55    FOTO:  Intake Score: 50%  Survey Score 2: 58%  Survey Score 3: 54%    Precautions:       Subjective   Charlie was without new complaint..         Objective            Treatment:  Therapeutic Exercise  TE 1: Seated: :LLPS, gentle PROM: Elbow resting in extension with MHP one the elbow x 10 min with 1.5 lb cuff weight  TE 2: Seated: :LLPS, gentle PROM: Elbow Extension 3 planes, 15+ reps ea plane  TE 3: Seated :LLPS, gentle PROM: Elbow flexion, 3  planes, 15+ reps ea plane  TE 4: Seated: FA 3 planes, elbow Pre's,  3/10 ea each plane with 7 lb dumbbell,  TE 5: Seated: FA 3 planes, wrist  Pre's, 3/10 ea each plane with 7 lb dumbbell,  TE 7: FA PRE's , 7 lbs, holding on end 2/20  TE 8: Prone, 10 lbs, row and shoulder ext 3/10 reps  TE 9: Prone: 5 lbs, elbow ext, 2/10 reps  TE 10: 20 lb cattle bell carry at side x 3 min to promote ext.  Therapeutic Activity  TA 3: Green flexbar twisting: FA sup, fa pro, wrist flex, wrist ext, wrist rd, wrist ud 20 reps each    Time Entry(in minutes):  Therapeutic Activity Time Entry: 8  Therapeutic Exercise Time Entry: 47    Assessment & Plan   Assessment:    Evaluation/Treatment Tolerance: Patient tolerated treatment well    The  patient will continue to benefit from skilled outpatient occupational therapy in order to address the deficits listed in the problem list on the initial evaluation, provide patient and family education, and maximize the patients level of independence in the home and community environments.     The patient's spiritual, cultural, and educational needs were considered, and the patient is agreeable to the plan of care and goals.           Plan: Continue per OT POC with focus on return to funcitonal independence. Await delivery of rosa splint to patient .    Goals:   Active       Long Term Goals       [place] will demonstrate significantly improved functional performance from re-assessment as measured by a 20 ncrease in FOTO function score.  (Progressing)       Start:  03/13/25    Expected End:  06/05/25            Patient will achieve left  strength that is 90% that of the .right dominant hand to improve function with ADLs/work/leisure tasks.  (Progressing)       Start:  03/13/25    Expected End:  06/05/25            Patient will report pain 2 out of 10 at worst during use  in IADL's to improve function with ADLs/work/leisure tasks..  (Progressing)       Start:  03/13/25    Expected End:  06/05/25            Patient will demo full fa rom  to improve functional grasp for ADLs/work/leisure tasks.   (Met)       Start:  03/13/25    Expected End:  06/05/25    Resolved:  05/20/25         Pt will demo full wrist arom to improve function with ADLs/work/leisure tasks.  (Met)       Start:  03/13/25    Expected End:  06/05/25    Resolved:  05/20/25            short term goals       [place] will demonstrate significantly improved functional performance from re-assessment as measured by a 10 point Increase in FOTO function score.  (Progressing)       Start:  03/13/25    Expected End:  06/05/25            Patient will achieve left  strength that is 60 % that of the  right dominant hand to improve function with ADLs/work/leisure  tasks.  (Progressing)       Start:  03/13/25    Expected End:  06/05/25            Patient will report pain 2 out of 10 at worst during use in light ADL's to improve function with ADLs/work/leisure tasks. .  (Progressing)       Start:  03/13/25    Expected End:  06/05/25            Patient will demo 10 to 120 degrees elbow arom for ADLs/work/leisure tasks.   (Progressing)       Start:  03/13/25    Expected End:  06/05/25            Pt will demo full arom FA  to improve function with ADLs/work/leisure tasks.  (Progressing)       Start:  03/13/25    Expected End:  06/05/25                Mariola Durand OT

## 2025-05-29 ENCOUNTER — CLINICAL SUPPORT (OUTPATIENT)
Dept: REHABILITATION | Facility: HOSPITAL | Age: 45
End: 2025-05-29
Payer: COMMERCIAL

## 2025-05-29 DIAGNOSIS — M25.622 ELBOW STIFFNESS, LEFT: ICD-10-CM

## 2025-05-29 DIAGNOSIS — M25.612 DECREASED RANGE OF MOTION OF SHOULDER, LEFT: Primary | ICD-10-CM

## 2025-05-29 DIAGNOSIS — M25.522 LEFT ELBOW PAIN: Primary | ICD-10-CM

## 2025-05-29 DIAGNOSIS — M25.512 PAIN IN JOINT OF LEFT SHOULDER: ICD-10-CM

## 2025-05-29 PROCEDURE — 97110 THERAPEUTIC EXERCISES: CPT

## 2025-05-29 PROCEDURE — 97530 THERAPEUTIC ACTIVITIES: CPT

## 2025-05-29 PROCEDURE — 97112 NEUROMUSCULAR REEDUCATION: CPT

## 2025-05-29 NOTE — PROGRESS NOTES
Outpatient Rehab    Occupational Therapy Visit    Patient Name: Charlie Mazariegos Jr.  MRN: [id number]  YOB: 1980  Encounter Date: 2025    Therapy Diagnosis:   Encounter Diagnoses   Name Primary?    Left elbow pain Yes    Elbow stiffness, left     Pain in joint of left shoulder      Physician: Lisandra Lazaro PA-C    Physician Orders: Eval and Treat  Medical Diagnosis: Unspecified fracture of lower end of left humerus, initial encounter for closed fracture    Visit # / Visits Authorized:   Insurance Authorization Period: 3/13/2025 to 2025  Date of Evaluation: 3/13/2025  Plan of Care Certification: 3/13/2025 to 2025      Time In: 1500   Time Out: 1555  Total Time (in minutes): 55   Total Billable Time (in minutes): 50    FOTO:  Intake Score: 50%  Survey Score 2: 58%  Survey Score 3: 54%    Precautions:       Subjective   Charlie was without new complaints..    Not rated today,    Objective            Treatment:  Therapeutic Exercise  TE 1: Seated: :LLPS, gentle PROM: Elbow resting in extension with MHP one the elbow x 10 min with 1.5 lb cuff weight  TE 2: Seated: :LLPS, gentle PROM: Elbow Extension 3 planes, 15+ reps ea plane  TE 3: Seated :LLPS, gentle PROM: Elbow flexion, 3  planes, 15+ reps ea plane  TE 8: Prone, 10 lbs, row and shoulder ext 3/10 reps  TE 9: Prone: 5 lbs, elbow ext, 2/10 reps  Therapeutic Activity  TA 2: BTE- 45 sec each of the followin: wrist flex, 504 wrist ext, 504 fa sup, 504 fa pro, 302 wrist rd, 302 wrist ud 162: 3-jaw pinch (45 sec), 162: lateral pinch (30 sec), 162:  (60 sec)    Time Entry(in minutes):  Therapeutic Activity Time Entry: 10  Therapeutic Exercise Time Entry: 40    Assessment & Plan   Assessment: Pt received dynasplint for elbow extension to be used for two 30-minute sessions per day and increase in to four 30 minute sessions over the next few days. Pt also received another ext splint for night wear.  Evaluation/Treatment  Tolerance: Patient tolerated treatment well    The patient will continue to benefit from skilled outpatient occupational therapy in order to address the deficits listed in the problem list on the initial evaluation, provide patient and family education, and maximize the patients level of independence in the home and community environments.     The patient's spiritual, cultural, and educational needs were considered, and the patient is agreeable to the plan of care and goals.           Plan: Continue per OT POC with focus on return to funcitonal independence.    Goals:   Active       Long Term Goals       [place] will demonstrate significantly improved functional performance from re-assessment as measured by a 20 ncrease in FOTO function score.  (Progressing)       Start:  03/13/25    Expected End:  06/05/25            Patient will achieve left  strength that is 90% that of the .right dominant hand to improve function with ADLs/work/leisure tasks.  (Progressing)       Start:  03/13/25    Expected End:  06/05/25            Patient will report pain 2 out of 10 at worst during use  in IADL's to improve function with ADLs/work/leisure tasks..  (Progressing)       Start:  03/13/25    Expected End:  06/05/25            Patient will demo full fa rom  to improve functional grasp for ADLs/work/leisure tasks.   (Met)       Start:  03/13/25    Expected End:  06/05/25    Resolved:  05/20/25         Pt will demo full wrist arom to improve function with ADLs/work/leisure tasks.  (Met)       Start:  03/13/25    Expected End:  06/05/25    Resolved:  05/20/25            short term goals       [place] will demonstrate significantly improved functional performance from re-assessment as measured by a 10 point Increase in FOTO function score.  (Progressing)       Start:  03/13/25    Expected End:  06/05/25            Patient will achieve left  strength that is 60 % that of the  right dominant hand to improve function with  ADLs/work/leisure tasks.  (Progressing)       Start:  03/13/25    Expected End:  06/05/25            Patient will report pain 2 out of 10 at worst during use in light ADL's to improve function with ADLs/work/leisure tasks. .  (Progressing)       Start:  03/13/25    Expected End:  06/05/25            Patient will demo 10 to 120 degrees elbow arom for ADLs/work/leisure tasks.   (Progressing)       Start:  03/13/25    Expected End:  06/05/25            Pt will demo full arom FA  to improve function with ADLs/work/leisure tasks.  (Progressing)       Start:  03/13/25    Expected End:  06/05/25                Mariola Durand OT

## 2025-05-29 NOTE — PROGRESS NOTES
Outpatient Rehab    Physical Therapy Visit    Patient Name: Charlie Mazariegos Jr.  MRN: [id number]  YOB: 1980  Encounter Date: 5/29/2025    Therapy Diagnosis:   Encounter Diagnosis   Name Primary?    Decreased range of motion of shoulder, left Yes     Physician: Lisandra Lazaro PA-C    Physician Orders: Eval and Treat  Medical Diagnosis: Pain in left shoulder    Visit # / Visits Authorized:  4 / 20  Insurance Authorization Period: 5/8/2025 to 12/31/2025  Date of Evaluation: 5/12/2025  Plan of Care Certification: 5/12/2025 to 7/25/2025      PT/PTA:     Number of PTA visits since last PT visit:   Time In: 1345   Time Out: 1458  Total Time (in minutes): 73   Total Billable Time (in minutes): 64    FOTO:  Intake Score:  %  Survey Score 2:  %  Survey Score 3:  %    Precautions:       Subjective   doing well today, no new complaints;.  Pain reported as 2/10. left shoulder    Objective            Treatment:  Balance/Neuromuscular Re-Education  NMR 4: held  NMR 5: prone row: 6 lbs 4x10  NMR 6: star pattern: yellow pb band 2x15  NMR 7: ER/ serratus perturbations: 30 seconds, 4x  NMR 8: serratus wall slides: 5x5 with yellow band  Therapeutic Activity  TA 1: CC Y and T pull downs: 2x8 15 lbs for reaching and pulling tasks  TA 2: standing Y and T with green tband 3x10 each reaching tasks  TA 4: seated fall pull downs: 45 lbs 4x10    Time Entry(in minutes):  Neuromuscular Re-Education Time Entry: 28  Therapeutic Activity Time Entry: 45    Assessment & Plan   Assessment: Resumed therapeutic activities addressing pulling, pushing and reaching. addition of cable cross face pulls for added challenge. Good tolerance overall with adequate fatigue achieved.  Evaluation/Treatment Tolerance: Patient tolerated treatment well    The patient will continue to benefit from skilled outpatient physical therapy in order to address the deficits listed in the problem list on the initial evaluation, provide patient and family  education, and maximize the patients level of independence in the home and community environments.     The patient's spiritual, cultural, and educational needs were considered, and the patient is agreeable to the plan of care and goals.           Plan: Progress as tolerated per POC goals.    Goals:   Active       long term goals       Pt will improve FOTO score to </= 20% to demonstrate improvements in functional mobility including carrying, moving, and handling objects  (Progressing)       Start:  05/12/25    Expected End:  07/25/25            Pt will improve impaired shoulder MMTs 1 grade B to improve strength for household duties.  (Progressing)       Start:  05/12/25    Expected End:  07/25/25            Pt will demonstrate improved perscapular strength and endurance to show improved OH mobility and activity tolerance. (Progressing)       Start:  05/12/25    Expected End:  07/25/25               short term goals       Pt will be independent with HEP to assist PT treatment in restoring pain free motion of the L shoulder. (Progressing)       Start:  05/12/25    Expected End:  06/13/25            Pt will improve impaired shoulder MMTs 1/2 grade B to improve strength for functional tasks.  (Progressing)       Start:  05/12/25    Expected End:  06/13/25            Pt will demonstrate improved postural awareness requiring less than 3 VC during therapeutic activity.  (Progressing)       Start:  05/12/25    Expected End:  06/13/25                Lupe Lemus, PT, DPT, OCS

## 2025-06-02 ENCOUNTER — CLINICAL SUPPORT (OUTPATIENT)
Dept: REHABILITATION | Facility: HOSPITAL | Age: 45
End: 2025-06-02
Payer: COMMERCIAL

## 2025-06-02 DIAGNOSIS — M25.512 PAIN IN JOINT OF LEFT SHOULDER: ICD-10-CM

## 2025-06-02 DIAGNOSIS — M25.622 ELBOW STIFFNESS, LEFT: ICD-10-CM

## 2025-06-02 DIAGNOSIS — M25.612 DECREASED RANGE OF MOTION OF SHOULDER, LEFT: Primary | ICD-10-CM

## 2025-06-02 DIAGNOSIS — M25.522 LEFT ELBOW PAIN: Primary | ICD-10-CM

## 2025-06-02 PROCEDURE — 97530 THERAPEUTIC ACTIVITIES: CPT

## 2025-06-02 PROCEDURE — 97110 THERAPEUTIC EXERCISES: CPT

## 2025-06-02 PROCEDURE — 97112 NEUROMUSCULAR REEDUCATION: CPT

## 2025-06-03 ENCOUNTER — CLINICAL SUPPORT (OUTPATIENT)
Dept: REHABILITATION | Facility: HOSPITAL | Age: 45
End: 2025-06-03
Payer: COMMERCIAL

## 2025-06-03 DIAGNOSIS — M25.622 ELBOW STIFFNESS, LEFT: ICD-10-CM

## 2025-06-03 DIAGNOSIS — M25.522 LEFT ELBOW PAIN: Primary | ICD-10-CM

## 2025-06-03 DIAGNOSIS — M25.512 PAIN IN JOINT OF LEFT SHOULDER: ICD-10-CM

## 2025-06-03 PROCEDURE — 97530 THERAPEUTIC ACTIVITIES: CPT

## 2025-06-03 PROCEDURE — 97110 THERAPEUTIC EXERCISES: CPT

## 2025-06-05 ENCOUNTER — HOSPITAL ENCOUNTER (OUTPATIENT)
Dept: RADIOLOGY | Facility: HOSPITAL | Age: 45
Discharge: HOME | End: 2025-06-05
Attending: ORTHOPAEDIC SURGERY
Payer: COMMERCIAL

## 2025-06-05 ENCOUNTER — OFFICE VISIT (OUTPATIENT)
Dept: SPORTS MEDICINE | Facility: CLINIC | Age: 45
End: 2025-06-05
Payer: COMMERCIAL

## 2025-06-05 ENCOUNTER — CLINICAL SUPPORT (OUTPATIENT)
Dept: REHABILITATION | Facility: HOSPITAL | Age: 45
End: 2025-06-05
Payer: COMMERCIAL

## 2025-06-05 VITALS
DIASTOLIC BLOOD PRESSURE: 92 MMHG | BODY MASS INDEX: 31.36 KG/M2 | HEART RATE: 68 BPM | SYSTOLIC BLOOD PRESSURE: 128 MMHG | HEIGHT: 74 IN | WEIGHT: 244.38 LBS

## 2025-06-05 DIAGNOSIS — M75.42 SUBACROMIAL IMPINGEMENT, LEFT: ICD-10-CM

## 2025-06-05 DIAGNOSIS — M75.22 BICEPS TENDINITIS OF LEFT SHOULDER: ICD-10-CM

## 2025-06-05 DIAGNOSIS — M25.612 DECREASED RANGE OF MOTION OF SHOULDER, LEFT: Primary | ICD-10-CM

## 2025-06-05 DIAGNOSIS — M25.512 CHRONIC LEFT SHOULDER PAIN: ICD-10-CM

## 2025-06-05 DIAGNOSIS — M25.512 CHRONIC LEFT SHOULDER PAIN: Primary | ICD-10-CM

## 2025-06-05 DIAGNOSIS — G89.29 CHRONIC LEFT SHOULDER PAIN: Primary | ICD-10-CM

## 2025-06-05 DIAGNOSIS — G89.29 CHRONIC LEFT SHOULDER PAIN: ICD-10-CM

## 2025-06-05 PROCEDURE — 3074F SYST BP LT 130 MM HG: CPT | Mod: CPTII,S$GLB,, | Performed by: ORTHOPAEDIC SURGERY

## 2025-06-05 PROCEDURE — 3080F DIAST BP >= 90 MM HG: CPT | Mod: CPTII,S$GLB,, | Performed by: ORTHOPAEDIC SURGERY

## 2025-06-05 PROCEDURE — 99214 OFFICE O/P EST MOD 30 MIN: CPT | Mod: S$GLB,,, | Performed by: ORTHOPAEDIC SURGERY

## 2025-06-05 PROCEDURE — 97530 THERAPEUTIC ACTIVITIES: CPT

## 2025-06-05 PROCEDURE — 99999 PR PBB SHADOW E&M-EST. PATIENT-LVL IV: CPT | Mod: PBBFAC,,, | Performed by: ORTHOPAEDIC SURGERY

## 2025-06-05 PROCEDURE — 97112 NEUROMUSCULAR REEDUCATION: CPT

## 2025-06-05 PROCEDURE — 73030 X-RAY EXAM OF SHOULDER: CPT | Mod: TC,LT

## 2025-06-05 PROCEDURE — 3008F BODY MASS INDEX DOCD: CPT | Mod: CPTII,S$GLB,, | Performed by: ORTHOPAEDIC SURGERY

## 2025-06-05 PROCEDURE — 1159F MED LIST DOCD IN RCRD: CPT | Mod: CPTII,S$GLB,, | Performed by: ORTHOPAEDIC SURGERY

## 2025-06-05 PROCEDURE — 73030 X-RAY EXAM OF SHOULDER: CPT | Mod: 26,LT,, | Performed by: RADIOLOGY

## 2025-06-09 ENCOUNTER — PATIENT MESSAGE (OUTPATIENT)
Dept: ORTHOPEDICS | Facility: CLINIC | Age: 45
End: 2025-06-09
Payer: COMMERCIAL

## 2025-06-09 ENCOUNTER — CLINICAL SUPPORT (OUTPATIENT)
Dept: REHABILITATION | Facility: HOSPITAL | Age: 45
End: 2025-06-09
Payer: COMMERCIAL

## 2025-06-09 DIAGNOSIS — M25.512 PAIN IN JOINT OF LEFT SHOULDER: ICD-10-CM

## 2025-06-09 DIAGNOSIS — M25.622 ELBOW STIFFNESS, LEFT: ICD-10-CM

## 2025-06-09 DIAGNOSIS — M25.522 LEFT ELBOW PAIN: Primary | ICD-10-CM

## 2025-06-09 PROCEDURE — 97530 THERAPEUTIC ACTIVITIES: CPT | Mod: CO

## 2025-06-09 PROCEDURE — 97110 THERAPEUTIC EXERCISES: CPT | Mod: CO

## 2025-06-09 NOTE — PROGRESS NOTES
Outpatient Rehab    Occupational Therapy Visit    Patient Name: Charlie Mazariegos Jr.  MRN: [id number]  YOB: 1980  Encounter Date: 6/9/2025    Therapy Diagnosis:   Encounter Diagnoses   Name Primary?    Left elbow pain Yes    Elbow stiffness, left     Pain in joint of left shoulder      Physician: Lisandra Lazaro PA-C    Physician Orders: Eval and Treat  Medical Diagnosis: Unspecified fracture of lower end of left humerus, initial encounter for closed fracture  Surgical Diagnosis: PROCEDURE(S) PERFORMED: Open reduction internal fixation intra-articular left elbow fracture with coronoid fracture ORIF Primary repair left elbow ulnar collateral ligament, CPT code 82569 Loose body removal left elbow, multiple CPT code 26541 Ulnar nerve decompression and extensive external neurolysis left elbow, CPT code 72652 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  Pain reported as 0/10.      Objective            Treatment:  Therapeutic Exercise  TE 1: Seated: :LLPS, gentle PROM: Elbow resting in extension with MHP one the elbow x 10 min with 1.5 lb cuff weight  TE 2: Supine: :LLPS, gentle PROM: Elbow Extension 3 planes, 15+ reps ea plane  TE 3: Supine :LLPS, gentle PROM: Elbow flexion, 3  planes, 10+ reps ea plane  TE 4: Seated: FA 3 planes, elbow Pre's,  3/10 ea each plane with 8  lb dumbbell,  TE 5: Seated: FA 3 planes, wrist  Pre's, 3/10 ea each plane with 10 lb dumbbell,  TE  7: FA PRE's , 10 lbs, holding on end   TE 8: Prone, 10 lbs, row and shoulder ext 3/10 reps  TE 9: Prone and supine: 5 lbs, elbow ext, 2/10 reps  Therapeutic Activity  TA 2: BTE- 45 sec each of the followin: wrist flex, 504 wrist ext, 504 fa sup, 504 fa pro, 302 wrist rd, 302 wrist ud 162: 3-jaw pinch (45 sec), 162: lateral pinch (30 sec), 162:  (60 sec)    Time Entry(in minutes):  Therapeutic Activity Time Entry: 15  Therapeutic Exercise Time Entry: 42    Assessment & Plan   Assessment: pt progressing well with strength and ROM  Evaluation/Treatment Tolerance: Patient tolerated treatment well    The patient will continue to benefit from skilled outpatient occupational therapy in order to address the deficits listed in the problem list on the initial evaluation, provide patient and family education, and maximize the patients level of independence in the home and community environments.     The patient's spiritual, cultural, and educational needs were considered, and the patient is agreeable to the plan of care and goals.           Plan: Continue per OT POC with focus on return to funcitonal independence.    Goals:   Active       Long Term Goals       Charlie will demonstrate significantly improved functional performance from re-assessment as measured by a 20 ncrease in FOTO function score.  (Progressing)       Start:  25    Expected End:  25            Patient will achieve left  strength that is 90% that of the .right dominant hand to improve function with ADLs/work/leisure tasks.  (Progressing)       Start:  25    Expected End:  25            Patient will report pain 2 out of 10 at worst during use  in IADL's to improve function with ADLs/work/leisure tasks..  (Progressing)       Start:  25    Expected End:  25            Patient will demo full fa rom  to improve functional grasp for ADLs/work/leisure tasks.   (Met)       Start:  25    Expected End:  25     Resolved:  05/20/25         Pt will demo full wrist arom to improve function with ADLs/work/leisure tasks.  (Met)       Start:  03/13/25    Expected End:  06/05/25    Resolved:  05/20/25            short term goals       [place] will demonstrate significantly improved functional performance from re-assessment as measured by a 10 point Increase in FOTO function score.  (Progressing)       Start:  03/13/25    Expected End:  07/14/25            Patient will achieve left  strength that is 60 % that of the  right dominant hand to improve function with ADLs/work/leisure tasks.  (Met)       Start:  03/13/25    Expected End:  06/05/25    Resolved:  06/02/25         Patient will report pain 2 out of 10 at worst during use in light ADL's to improve function with ADLs/work/leisure tasks. .  (Met)       Start:  03/13/25    Expected End:  06/05/25    Resolved:  06/02/25         Patient will demo 10 to 120 degrees elbow arom for ADLs/work/leisure tasks.   (Progressing)       Start:  03/13/25    Expected End:  07/14/25            Pt will demo full arom FA  to improve function with ADLs/work/leisure tasks.  (Progressing)       Start:  03/13/25    Expected End:  07/14/25                PEDRO Dawson/DAXA

## 2025-06-10 ENCOUNTER — CLINICAL SUPPORT (OUTPATIENT)
Dept: REHABILITATION | Facility: HOSPITAL | Age: 45
End: 2025-06-10
Payer: COMMERCIAL

## 2025-06-10 DIAGNOSIS — M25.522 LEFT ELBOW PAIN: Primary | ICD-10-CM

## 2025-06-10 DIAGNOSIS — M25.622 ELBOW STIFFNESS, LEFT: ICD-10-CM

## 2025-06-10 DIAGNOSIS — M25.512 PAIN IN JOINT OF LEFT SHOULDER: ICD-10-CM

## 2025-06-10 PROCEDURE — 97110 THERAPEUTIC EXERCISES: CPT

## 2025-06-10 PROCEDURE — 97530 THERAPEUTIC ACTIVITIES: CPT

## 2025-06-10 NOTE — PROGRESS NOTES
Outpatient Rehab    Occupational Therapy Visit    Patient Name: Charlie Mazariegos Jr.  MRN: [id number]  YOB: 1980  Encounter Date: 6/10/2025    Therapy Diagnosis:   Encounter Diagnoses   Name Primary?    Left elbow pain Yes    Elbow stiffness, left     Pain in joint of left shoulder      Physician: Lisandra Lazaro PA-C    Physician Orders: Eval and Treat  Medical Diagnosis: Unspecified fracture of lower end of left humerus, initial encounter for closed fracture  Surgical Diagnosis: PROCEDURE(S) PERFORMED: Open reduction internal fixation intra-articular left elbow fracture with coronoid fracture ORIF Primary repair left elbow ulnar collateral ligament, CPT code 89945 Loose body removal left elbow, multiple CPT code 85987 Ulnar nerve decompression and extensive external neurolysis left elbow, CPT code 07459 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    Not rated today,    Objective     Elbow/Forearm Range of Motion   Left Elbow/Forearm   Active (deg) Passive (deg) Pain   Flexion 125       Extension -13       Forearm Pronation         Forearm Supination                          Left  Strength  Left Hand Dynamometer Position: 3  Elbow Position Forearm Position Trial 1 (lbs) Trial 2 (lbs) Trial 3 (lbs) Average (lbs) Pain   Flexed Neutral 83                 /Pinch Details  Left  strength improved by 16 lbs since last reassessment performed on 5/22/2025             Treatment:  Therapeutic Exercise  TE 1: Seated: :LLPS, gentle PROM: Elbow resting in extension with MHP one the elbow x 10 min with 1.5 lb cuff weight  TE 2: Supine: :LLPS, gentle PROM: Elbow Extension 3 planes, 10+ reps ea plane  TE 3: Supine :LLPS, gentle PROM: Elbow flexion, 3  planes, 10+ reps ea plane  TE 4: Seated: FA 3 planes, elbow Pre's,  3/10 ea each plane with 8  lb dumbbell,  TE 5: Seated: FA 3 planes, wrist  Pre's, 3/10 ea each plane with 10 lb dumbbell,  TE 6: reassessment of arom and  strength  TE 7: FA PRE's , 10 lbs, holding on end 220  TE 8: Prone, 10 lbs, row and shoulder ext 3/10 reps  TE 9: Prone and supine: 5 lbs, elbow ext, 2/10 reps  TE 10: 20 lb kettle bell carry at side x 3 min to promote ext.  Therapeutic Activity  TA 2: BTE- 45 sec each of the followin: wrist flex, 504 wrist ext, 504 fa sup, 504 fa pro, 302 wrist rd, 302 wrist ud 162: 3-jaw pinch (45 sec), 162: lateral pinch (30 sec), 162:  (60 sec)  TA 3: Green flexbar twisting: FA sup, fa pro, wrist flex, wrist ext, wrist rd, wrist ud 20 reps each    Time Entry(in minutes):  Therapeutic Activity Time Entry: 15  Therapeutic Exercise Time Entry: 55    Assessment & Plan   Assessment: Excellent increase noted in elbow ext with 13 degree deficit remaining. Left  strength has improved by 16 lbs over the past two weeks and is now 7% that of the right. (Normal wound be @ 90%.)       The patient will continue to benefit from skilled outpatient occupational therapy in order to address the deficits listed in the problem list on the initial evaluation, provide patient and family education, and maximize the patients level of independence in the home and community environments.     The patient's spiritual, cultural, and educational needs were considered, and the patient is agreeable to the plan of care and goals.           Plan: Continue per OT POC with focus on return to funcitonal independence.    Goals:   Active       Long  Term Goals       [place] will demonstrate significantly improved functional performance from re-assessment as measured by a 20 ncrease in FOTO function score.  (Progressing)       Start:  03/13/25    Expected End:  07/14/25            Patient will achieve left  strength that is 90% that of the .right dominant hand to improve function with ADLs/work/leisure tasks.  (Progressing)       Start:  03/13/25    Expected End:  07/14/25            Patient will report pain 2 out of 10 at worst during use  in IADL's to improve function with ADLs/work/leisure tasks..  (Progressing)       Start:  03/13/25    Expected End:  07/14/25            Patient will demo full fa rom  to improve functional grasp for ADLs/work/leisure tasks.   (Met)       Start:  03/13/25    Expected End:  06/05/25    Resolved:  05/20/25         Pt will demo full wrist arom to improve function with ADLs/work/leisure tasks.  (Met)       Start:  03/13/25    Expected End:  06/05/25    Resolved:  05/20/25            short term goals       Charlie will demonstrate significantly improved functional performance from re-assessment as measured by a 10 point Increase in FOTO function score.  (Progressing)       Start:  03/13/25    Expected End:  07/14/25            Patient will achieve left  strength that is 60 % that of the  right dominant hand to improve function with ADLs/work/leisure tasks.  (Met)       Start:  03/13/25    Expected End:  06/05/25    Resolved:  06/02/25         Patient will report pain 2 out of 10 at worst during use in light ADL's to improve function with ADLs/work/leisure tasks. .  (Met)       Start:  03/13/25    Expected End:  06/05/25    Resolved:  06/02/25         Patient will demo 10 to 120 degrees elbow arom for ADLs/work/leisure tasks.   (Progressing)       Start:  03/13/25    Expected End:  07/14/25            Pt will demo full arom FA  to improve function with ADLs/work/leisure tasks.  (Progressing)       Start:  03/13/25    Expected  End:  07/14/25                Mariola Durand, OT

## 2025-06-12 ENCOUNTER — TELEPHONE (OUTPATIENT)
Dept: ORTHOPEDICS | Facility: CLINIC | Age: 45
End: 2025-06-12
Payer: COMMERCIAL

## 2025-06-12 ENCOUNTER — CLINICAL SUPPORT (OUTPATIENT)
Dept: REHABILITATION | Facility: HOSPITAL | Age: 45
End: 2025-06-12
Payer: COMMERCIAL

## 2025-06-12 DIAGNOSIS — M25.512 PAIN IN JOINT OF LEFT SHOULDER: ICD-10-CM

## 2025-06-12 DIAGNOSIS — M25.622 ELBOW STIFFNESS, LEFT: ICD-10-CM

## 2025-06-12 DIAGNOSIS — M25.522 LEFT ELBOW PAIN: Primary | ICD-10-CM

## 2025-06-12 PROCEDURE — 97110 THERAPEUTIC EXERCISES: CPT

## 2025-06-12 PROCEDURE — 97530 THERAPEUTIC ACTIVITIES: CPT

## 2025-06-12 NOTE — PROGRESS NOTES
Outpatient Rehab    Occupational Therapy Visit    Patient Name: Charlie Mazariegos Jr.  MRN: [id number]  YOB: 1980  Encounter Date: 6/12/2025    Therapy Diagnosis:   Encounter Diagnoses   Name Primary?    Left elbow pain Yes    Elbow stiffness, left     Pain in joint of left shoulder      Physician: Lisandra Lazaro PA-C    Physician Orders: Eval and Treat  Medical Diagnosis: Unspecified fracture of lower end of left humerus, initial encounter for closed fracture  Surgical Diagnosis: PROCEDURE(S) PERFORMED: Open reduction internal fixation intra-articular left elbow fracture with coronoid fracture ORIF Primary repair left elbow ulnar collateral ligament, CPT code 96179 Loose body removal left elbow, multiple CPT code 99729 Ulnar nerve decompression and extensive external neurolysis left elbow, CPT code 82049 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         Objective            Treatment:  Therapeutic Exercise  TE 1: Seated: :LLPS, gentle PROM: Elbow resting in extension with MHP one the elbow x 10 min with 1.5 lb cuff weight  TE 2: Supine: :LLPS, gentle PROM: Elbow Extension 3 planes, 10+ reps ea plane  TE 3: Supine :LLPS, gentle PROM: Elbow flexion, 3  planes, 10+ reps ea plane  TE 4: Seated: FA 3 planes, elbow Pre's,  3/10 ea each plane with 8  lb dumbbell,  TE 5: Seated: FA 3 planes, wrist  Pre's, 3/10 ea each plane with 10 lb dumbbell,  TE 7: FA  PRE's , 10 lbs, holding on end   TE 8: Prone, 10 lbs, row and shoulder ext 3/10 reps  TE 9: Prone and supine: 5 lbs, elbow ext, 2/10 reps  TE 10: 20 lb kettle bell carry at side x 3 min to promote ext.  Therapeutic Activity  TA 2: BTE- 45 sec each of the followin: wrist flex, 504 wrist ext, 504 fa sup, 504 fa pro, 302 wrist rd, 302 wrist ud 162: 3-jaw pinch (45 sec), 162: lateral pinch (30 sec), 162:  (60 sec)  TA 3: Blue flexbar twisting: FA sup, fa pro, wrist flex, wrist ext, wrist rd, wrist ud 20 reps each    Time Entry(in minutes):  Therapeutic Activity Time Entry: 20  Therapeutic Exercise Time Entry: 40    Assessment & Plan   Assessment: Upon formal reassessment, excellent increase was noted in elbow ext with 13 degree deficit remaining. Left  strength was noted to improve by 16 lbs over the past two weeks and is now 7% that of the right. (Normal wound be @ 90%.)       The patient will continue to benefit from skilled outpatient occupational therapy in order to address the deficits listed in the problem list on the initial evaluation, provide patient and family education, and maximize the patients level of independence in the home and community environments.     The patient's spiritual, cultural, and educational needs were considered, and the patient is agreeable to the plan of care and goals.           Plan: Continue per OT POC with focus on return to funcitonal independence.    Goals:   Active       Long Term Goals       Charlie will demonstrate significantly improved functional performance from re-assessment as measured by a 20 ncrease in FOTO function score.  (Progressing)       Start:  25    Expected End:  25            Patient will achieve left  strength that is 90% that of the .right dominant hand to improve function with ADLs/work/leisure tasks.  (Progressing)       Start:  25    Expected End:  25            Patient will report pain 2 out of 10 at worst during  use  in IADL's to improve function with ADLs/work/leisure tasks..  (Progressing)       Start:  03/13/25    Expected End:  07/14/25            Patient will demo full fa rom  to improve functional grasp for ADLs/work/leisure tasks.   (Met)       Start:  03/13/25    Expected End:  06/05/25    Resolved:  05/20/25         Pt will demo full wrist arom to improve function with ADLs/work/leisure tasks.  (Met)       Start:  03/13/25    Expected End:  06/05/25    Resolved:  05/20/25            short term goals       Charlie will demonstrate significantly improved functional performance from re-assessment as measured by a 10 point Increase in FOTO function score.  (Progressing)       Start:  03/13/25    Expected End:  07/14/25            Patient will achieve left  strength that is 60 % that of the  right dominant hand to improve function with ADLs/work/leisure tasks.  (Met)       Start:  03/13/25    Expected End:  06/05/25    Resolved:  06/02/25         Patient will report pain 2 out of 10 at worst during use in light ADL's to improve function with ADLs/work/leisure tasks. .  (Met)       Start:  03/13/25    Expected End:  06/05/25    Resolved:  06/02/25         Patient will demo 10 to 120 degrees elbow arom for ADLs/work/leisure tasks.   (Progressing)       Start:  03/13/25    Expected End:  07/14/25            Pt will demo full arom FA  to improve function with ADLs/work/leisure tasks.  (Progressing)       Start:  03/13/25    Expected End:  07/14/25                Mariola Durand OT

## 2025-06-16 ENCOUNTER — CLINICAL SUPPORT (OUTPATIENT)
Dept: REHABILITATION | Facility: HOSPITAL | Age: 45
End: 2025-06-16
Payer: COMMERCIAL

## 2025-06-16 DIAGNOSIS — M25.522 LEFT ELBOW PAIN: Primary | ICD-10-CM

## 2025-06-16 DIAGNOSIS — M25.622 ELBOW STIFFNESS, LEFT: ICD-10-CM

## 2025-06-16 DIAGNOSIS — M25.512 PAIN IN JOINT OF LEFT SHOULDER: ICD-10-CM

## 2025-06-16 PROCEDURE — 97530 THERAPEUTIC ACTIVITIES: CPT | Mod: CO

## 2025-06-16 PROCEDURE — 97110 THERAPEUTIC EXERCISES: CPT | Mod: CO

## 2025-06-16 NOTE — PROGRESS NOTES
Outpatient Rehab    Occupational Therapy Visit    Patient Name: Charlie Mazariegos Jr.  MRN: [id number]  YOB: 1980  Encounter Date: 6/16/2025    Therapy Diagnosis:   Encounter Diagnoses   Name Primary?    Left elbow pain Yes    Elbow stiffness, left     Pain in joint of left shoulder      Physician: Lisandra Lazaro PA-C    Physician Orders: Eval and Treat  Medical Diagnosis: Unspecified fracture of lower end of left humerus, initial encounter for closed fracture  Surgical Diagnosis: PROCEDURE(S) PERFORMED: Open reduction internal fixation intra-articular left elbow fracture with coronoid fracture ORIF Primary repair left elbow ulnar collateral ligament, CPT code 02634 Loose body removal left elbow, multiple CPT code 72326 Ulnar nerve decompression and extensive external neurolysis left elbow, CPT code 00954 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  Pain reported as 0/10.      Objective            Treatment:  Therapeutic Exercise  TE 1: Seated: :LLPS, gentle PROM: Elbow resting in extension with MHP one the elbow x 10 min with 1.5 lb cuff weight  TE 2: Seated: :LLPS, gentle PROM: Elbow Extension 3 planes, 10+ reps ea plane  TE 3: Supine :LLPS, gentle PROM: Elbow flexion, 3  planes, 10+ reps ea plane  TE 4: Seated: FA 3 planes, elbow Pre's,  3/10 ea each plane with 10  lb dumbbell,  TE 5: Seated: FA 3 planes, wrist  Pre's, 3/10 ea each plane with 10 lb  dumbbell,  TE 6: Red theraband HEP: tricep, rows, pulldowns 2 x 10 ea  TE 7: FA PRE's , 10 lbs, holding on end   Therapeutic Activity  TA 2: BTE- 45 sec each of the followin: wrist flex, 504 wrist ext, 504 fa sup, 504 fa pro, 302 wrist rd, 302 wrist ud 162: 3-jaw pinch (45 sec), 162: lateral pinch (30 sec), 162:  (60 sec)  TA 3: Blue flexbar twisting: FA sup, fa pro, wrist flex, wrist ext, wrist rd, wrist ud 20 reps each    Time Entry(in minutes):  Therapeutic Activity Time Entry: 20  Therapeutic Exercise Time Entry: 35    Assessment & Plan   Assessment:    Evaluation/Treatment Tolerance: Patient tolerated treatment well    The patient will continue to benefit from skilled outpatient occupational therapy in order to address the deficits listed in the problem list on the initial evaluation, provide patient and family education, and maximize the patients level of independence in the home and community environments.     The patient's spiritual, cultural, and educational needs were considered, and the patient is agreeable to the plan of care and goals.           Plan: Continue per OT POC with focus on return to funcitonal independence.    Goals:   Active       Long Term Goals       [place] will demonstrate significantly improved functional performance from re-assessment as measured by a 20 ncrease in FOTO function score.  (Progressing)       Start:  25    Expected End:  25            Patient will achieve left  strength that is 90% that of the .right dominant hand to improve function with ADLs/work/leisure tasks.  (Progressing)       Start:  25    Expected End:  25            Patient will report pain 2 out of 10 at worst during use  in IADL's to improve function with ADLs/work/leisure tasks..  (Progressing)       Start:  25    Expected End:  25            Patient will demo full fa rom  to improve functional grasp for ADLs/work/leisure tasks.   (Met)       Start:   03/13/25    Expected End:  06/05/25    Resolved:  05/20/25         Pt will demo full wrist arom to improve function with ADLs/work/leisure tasks.  (Met)       Start:  03/13/25    Expected End:  06/05/25    Resolved:  05/20/25            short term goals       Charlie will demonstrate significantly improved functional performance from re-assessment as measured by a 10 point Increase in FOTO function score.  (Progressing)       Start:  03/13/25    Expected End:  07/14/25            Patient will achieve left  strength that is 60 % that of the  right dominant hand to improve function with ADLs/work/leisure tasks.  (Met)       Start:  03/13/25    Expected End:  06/05/25    Resolved:  06/02/25         Patient will report pain 2 out of 10 at worst during use in light ADL's to improve function with ADLs/work/leisure tasks. .  (Met)       Start:  03/13/25    Expected End:  06/05/25    Resolved:  06/02/25         Patient will demo 10 to 120 degrees elbow arom for ADLs/work/leisure tasks.   (Progressing)       Start:  03/13/25    Expected End:  07/14/25            Pt will demo full arom FA  to improve function with ADLs/work/leisure tasks.  (Progressing)       Start:  03/13/25    Expected End:  07/14/25                KALIA Dawson  Supervising OT, Nova Mcwilliams, was readily available to answer questions about the client's intervention at the time of the provision of services

## 2025-06-17 ENCOUNTER — CLINICAL SUPPORT (OUTPATIENT)
Dept: REHABILITATION | Facility: HOSPITAL | Age: 45
End: 2025-06-17
Payer: COMMERCIAL

## 2025-06-17 DIAGNOSIS — M25.522 LEFT ELBOW PAIN: Primary | ICD-10-CM

## 2025-06-17 DIAGNOSIS — M25.622 ELBOW STIFFNESS, LEFT: ICD-10-CM

## 2025-06-17 DIAGNOSIS — M25.512 PAIN IN JOINT OF LEFT SHOULDER: ICD-10-CM

## 2025-06-17 PROCEDURE — 97110 THERAPEUTIC EXERCISES: CPT | Mod: CO

## 2025-06-17 PROCEDURE — 97530 THERAPEUTIC ACTIVITIES: CPT | Mod: CO

## 2025-06-17 NOTE — PROGRESS NOTES
Outpatient Rehab    Occupational Therapy Visit    Patient Name: Charlie Mazariegos Jr.  MRN: [id number]  YOB: 1980  Encounter Date: 6/17/2025    Therapy Diagnosis:   Encounter Diagnoses   Name Primary?    Left elbow pain Yes    Elbow stiffness, left     Pain in joint of left shoulder      Physician: Lisandra Lazaro PA-C    Physician Orders: Eval and Treat  Medical Diagnosis: Unspecified fracture of lower end of left humerus, initial encounter for closed fracture  Surgical Diagnosis: PROCEDURE(S) PERFORMED: Open reduction internal fixation intra-articular left elbow fracture with coronoid fracture ORIF Primary repair left elbow ulnar collateral ligament, CPT code 17440 Loose body removal left elbow, multiple CPT code 55530 Ulnar nerve decompression and extensive external neurolysis left elbow, CPT code 90292 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  Pain reported as 0/10.      Objective            Treatment:  Therapeutic Exercise  TE 1: Seated: :LLPS, gentle PROM: Elbow resting in extension with MHP one the elbow x 10 min with 1.5 lb cuff weight  TE 2: Seated: :LLPS, gentle PROM: Elbow Extension 3 planes, 10+ reps ea plane  TE 3: Supine :LLPS, gentle PROM: Elbow flexion, 3  planes, 10+ reps ea plane  TE 4: Seated: FA 3 planes, elbow Pre's,  3/10 ea each plane with 10  lb dumbbell,  TE 5: Seated: FA 3 planes, wrist  Pre's, 3/10 ea each plane  with 10 lb dumbbell,  TE 6: Red theraband : triceps 2 x 10 ea  TE 7: FA PRE's , 10 lbs, holding on end   Therapeutic Activity  TA 1: UBE L3, FW/BW 3 min ea ( 6 min total)  TA 2: BTE- 45 sec each of the followin: wrist flex, 504 wrist ext, 504 fa sup, 504 fa pro, 302 wrist rd, 302 wrist ud 162: 3-jaw pinch (45 sec), 162: lateral pinch (30 sec), 162:  (60 sec)  TA 4: blue power web pushes and pulls x 30 ea    Time Entry(in minutes):  Therapeutic Activity Time Entry: 20  Therapeutic Exercise Time Entry: 34    Assessment & Plan   Assessment:    Evaluation/Treatment Tolerance: Patient tolerated treatment well    The patient will continue to benefit from skilled outpatient occupational therapy in order to address the deficits listed in the problem list on the initial evaluation, provide patient and family education, and maximize the patients level of independence in the home and community environments.     The patient's spiritual, cultural, and educational needs were considered, and the patient is agreeable to the plan of care and goals.           Plan: Continue per OT POC with focus on return to funcitonal independence.    Goals:   Active       Long Term Goals       [place] will demonstrate significantly improved functional performance from re-assessment as measured by a 20 ncrease in FOTO function score.  (Progressing)       Start:  25    Expected End:  25            Patient will achieve left  strength that is 90% that of the .right dominant hand to improve function with ADLs/work/leisure tasks.  (Progressing)       Start:  25    Expected End:  25            Patient will report pain 2 out of 10 at worst during use  in IADL's to improve function with ADLs/work/leisure tasks..  (Progressing)       Start:  25    Expected End:  25            Patient will demo full fa rom  to improve functional grasp for ADLs/work/leisure tasks.   (Met)       Start:  25    Expected  End:  06/05/25    Resolved:  05/20/25         Pt will demo full wrist arom to improve function with ADLs/work/leisure tasks.  (Met)       Start:  03/13/25    Expected End:  06/05/25    Resolved:  05/20/25            short term goals       [place] will demonstrate significantly improved functional performance from re-assessment as measured by a 10 point Increase in FOTO function score.  (Progressing)       Start:  03/13/25    Expected End:  07/14/25            Patient will achieve left  strength that is 60 % that of the  right dominant hand to improve function with ADLs/work/leisure tasks.  (Met)       Start:  03/13/25    Expected End:  06/05/25    Resolved:  06/02/25         Patient will report pain 2 out of 10 at worst during use in light ADL's to improve function with ADLs/work/leisure tasks. .  (Met)       Start:  03/13/25    Expected End:  06/05/25    Resolved:  06/02/25         Patient will demo 10 to 120 degrees elbow arom for ADLs/work/leisure tasks.   (Progressing)       Start:  03/13/25    Expected End:  07/14/25            Pt will demo full arom FA  to improve function with ADLs/work/leisure tasks.  (Progressing)       Start:  03/13/25    Expected End:  07/14/25                KALIA Dawson  Supervising OT, Nova Mcwilliams, was readily available to answer questions about the client's intervention at the time of the provision of services

## 2025-06-19 ENCOUNTER — CLINICAL SUPPORT (OUTPATIENT)
Dept: REHABILITATION | Facility: HOSPITAL | Age: 45
End: 2025-06-19
Payer: COMMERCIAL

## 2025-06-19 DIAGNOSIS — M25.512 PAIN IN JOINT OF LEFT SHOULDER: ICD-10-CM

## 2025-06-19 DIAGNOSIS — M25.522 LEFT ELBOW PAIN: Primary | ICD-10-CM

## 2025-06-19 DIAGNOSIS — M25.622 ELBOW STIFFNESS, LEFT: ICD-10-CM

## 2025-06-19 PROCEDURE — 97110 THERAPEUTIC EXERCISES: CPT | Mod: CO

## 2025-06-19 PROCEDURE — 97530 THERAPEUTIC ACTIVITIES: CPT | Mod: CO

## 2025-06-19 NOTE — PROGRESS NOTES
Outpatient Rehab    Occupational Therapy Visit    Patient Name: Charlie Mazariegos Jr.  MRN: [id number]  YOB: 1980  Encounter Date: 6/19/2025    Therapy Diagnosis:   Encounter Diagnoses   Name Primary?    Left elbow pain Yes    Elbow stiffness, left     Pain in joint of left shoulder      Physician: Lisandra Lazaro PA-C    Physician Orders: Eval and Treat  Medical Diagnosis: Unspecified fracture of lower end of left humerus, initial encounter for closed fracture  Surgical Diagnosis: PROCEDURE(S) PERFORMED: Open reduction internal fixation intra-articular left elbow fracture with coronoid fracture ORIF Primary repair left elbow ulnar collateral ligament, CPT code 17364 Loose body removal left elbow, multiple CPT code 55814 Ulnar nerve decompression and extensive external neurolysis left elbow, CPT code 30609 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  Pain reported as 0/10.      Objective            Treatment:  Therapeutic Exercise  TE 1: Seated: :LLPS, gentle PROM: Elbow resting in extension with MHP one the elbow x 10 min with 1.5 lb cuff weight  TE 4: Seated: FA 3 planes, elbow Pre's,  3/10 ea each plane with 10  lb dumbbell,  TE 5: Seated: FA 3 planes, wrist  Pre's, 3/10 ea each plane with 10 lb dumbbell,  TE 6: Red theraband : triceps, bruggers, rows, lat pull downs 2 x 10 ea  TE 7: FA with 3# dowel x 20  Therapeutic Activity  TA 2: BTE- 45 sec each of the  followin: wrist flex, 504 wrist ext, 504 fa sup, 504 fa pro, 302 wrist rd, 302 wrist ud 162: 3-jaw pinch (45 sec), 162: lateral pinch (30 sec), 162:  (60 sec)  TA 3: Blue flexbar twisting: FA sup, fa pro, wrist flex, wrist ext, wrist rd, wrist ud 20 reps each  TA 4: blue power web pushes and pulls x 30 ea    Time Entry(in minutes):  Therapeutic Activity Time Entry: 20  Therapeutic Exercise Time Entry: 35    Assessment & Plan   Assessment: ROM progressing with less effort for passive stretching. Continued progression of strength with no issues   Evaluation/Treatment Tolerance: Patient tolerated treatment well    The patient will continue to benefit from skilled outpatient occupational therapy in order to address the deficits listed in the problem list on the initial evaluation, provide patient and family education, and maximize the patients level of independence in the home and community environments.     The patient's spiritual, cultural, and educational needs were considered, and the patient is agreeable to the plan of care and goals.           Plan: Continue per OT POC with focus on return to funcitonal independence.    Goals:   Active       Long Term Goals       [place] will demonstrate significantly improved functional performance from re-assessment as measured by a 20 ncrease in FOTO function score.  (Progressing)       Start:  25    Expected End:  25            Patient will achieve left  strength that is 90% that of the .right dominant hand to improve function with ADLs/work/leisure tasks.  (Progressing)       Start:  25    Expected End:  25            Patient will report pain 2 out of 10 at worst during use  in IADL's to improve function with ADLs/work/leisure tasks..  (Progressing)       Start:  25    Expected End:  25            Patient will demo full fa rom  to improve functional grasp for ADLs/work/leisure tasks.   (Met)       Start:  25    Expected  End:  06/05/25    Resolved:  05/20/25         Pt will demo full wrist arom to improve function with ADLs/work/leisure tasks.  (Met)       Start:  03/13/25    Expected End:  06/05/25    Resolved:  05/20/25            short term goals       [place] will demonstrate significantly improved functional performance from re-assessment as measured by a 10 point Increase in FOTO function score.  (Progressing)       Start:  03/13/25    Expected End:  07/14/25            Patient will achieve left  strength that is 60 % that of the  right dominant hand to improve function with ADLs/work/leisure tasks.  (Met)       Start:  03/13/25    Expected End:  06/05/25    Resolved:  06/02/25         Patient will report pain 2 out of 10 at worst during use in light ADL's to improve function with ADLs/work/leisure tasks. .  (Met)       Start:  03/13/25    Expected End:  06/05/25    Resolved:  06/02/25         Patient will demo 10 to 120 degrees elbow arom for ADLs/work/leisure tasks.   (Progressing)       Start:  03/13/25    Expected End:  07/14/25            Pt will demo full arom FA  to improve function with ADLs/work/leisure tasks.  (Progressing)       Start:  03/13/25    Expected End:  07/14/25                KALIA Dawson  Supervising OT, Leti De Leon, was readily available to answer questions about the client's intervention at the time of the provision of services

## 2025-06-23 ENCOUNTER — CLINICAL SUPPORT (OUTPATIENT)
Dept: REHABILITATION | Facility: HOSPITAL | Age: 45
End: 2025-06-23
Payer: COMMERCIAL

## 2025-06-23 DIAGNOSIS — M25.612 DECREASED RANGE OF MOTION OF SHOULDER, LEFT: Primary | ICD-10-CM

## 2025-06-23 DIAGNOSIS — M25.512 PAIN IN JOINT OF LEFT SHOULDER: ICD-10-CM

## 2025-06-23 DIAGNOSIS — M25.622 ELBOW STIFFNESS, LEFT: ICD-10-CM

## 2025-06-23 DIAGNOSIS — M25.522 LEFT ELBOW PAIN: Primary | ICD-10-CM

## 2025-06-23 PROCEDURE — 97112 NEUROMUSCULAR REEDUCATION: CPT

## 2025-06-23 PROCEDURE — 97530 THERAPEUTIC ACTIVITIES: CPT

## 2025-06-23 PROCEDURE — 97110 THERAPEUTIC EXERCISES: CPT | Mod: CO

## 2025-06-23 PROCEDURE — 97530 THERAPEUTIC ACTIVITIES: CPT | Mod: CO

## 2025-06-23 NOTE — PROGRESS NOTES
Outpatient Rehab    Physical Therapy Visit    Patient Name: Charlie Mazariegos Jr.  MRN: [id number]  YOB: 1980  Encounter Date: 6/23/2025    Therapy Diagnosis:   Encounter Diagnosis   Name Primary?    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, CPT code 31808   Surgical Date: Not applicable for this Episode  Days Since Last Surgery: Not applicable for this Episode    Visit # / Visits Authorized:  7 / 20  Insurance Authorization Period: 5/8/2025 to 12/31/2025  Date of Evaluation: 5/12/2025  Plan of Care Certification: 5/12/2025 to 7/25/2025      PT/PTA:     Number of PTA visits since last PT visit:   Time In: 1425   Time Out: 1527  Total Time (in minutes): 62   Total Billable Time (in minutes): 62    FOTO:  Intake Score:  %  Survey Score 2:  %  Survey Score 3:  %    Precautions:       Subjective   doing well this afternoon; notes he is back at work on light duty. reports most limitations are due to his elbow..    Not rated today,    Objective            Treatment:  Balance/Neuromuscular Re-Education  NMR 5: prone row: 7 lbs 4x10  NMR 6: star pattern: yellow pb band 2x15  NMR 7: ER/ serratus perturbations: 30 seconds, 4x with green tband  NMR 8: serratus wall slides: 5x5 with yellow band  Therapeutic Activity  TA 1: CC Y and T pull downs: 2x8 20 lbs for reaching and pulling tasks  TA 2: standing Y and T with green tband 3x10 each reaching tasks  TA 3: seated landmines: 20 lbs  4x10 each for pushing, pulling and reaching  TA 4: seated face pull downs: 45 lbs 4x10  TA 5: tidal tank OH press: 3x10    Time Entry(in minutes):  Neuromuscular Re-Education Time Entry: 23  Therapeutic Activity Time Entry: 39    Assessment & Plan   Assessment: Resumed therapeutic activities addressing pulling, pushing and reaching. addition of tidal tank OH press for functional OH strengthening and stabilization.  Good tolerance overall with adequate fatigue achieved.  Evaluation/Treatment Tolerance: Patient tolerated treatment well    The patient will continue to benefit from skilled outpatient physical therapy in order to address the deficits listed in the problem list on the initial evaluation, provide patient and family education, and maximize the patients level of independence in the home and community environments.     The patient's spiritual, cultural, and educational needs were considered, and the patient is agreeable to the plan of care and goals.           Plan: Progress as tolerated per POC goals.    Goals:   Active       long term goals       Pt will improve FOTO score to </= 20% to demonstrate improvements in functional mobility including carrying, moving, and handling objects  (Progressing)       Start:  05/12/25    Expected End:  07/25/25            Pt will improve impaired shoulder MMTs 1 grade B to improve strength for household duties.  (Progressing)       Start:  05/12/25    Expected End:  07/25/25            Pt will demonstrate improved perscapular strength and endurance to show improved OH mobility and activity tolerance. (Progressing)       Start:  05/12/25    Expected End:  07/25/25               short term goals       Pt will be independent with HEP to assist PT treatment in restoring pain free motion of the L shoulder. (Progressing)       Start:  05/12/25    Expected End:  06/13/25            Pt will improve impaired shoulder MMTs 1/2 grade B to improve strength for functional tasks.   (Progressing)       Start:  05/12/25    Expected End:  06/13/25            Pt will demonstrate improved postural awareness requiring less than 3 VC during therapeutic activity.  (Progressing)       Start:  05/12/25    Expected End:  06/13/25                Lupe Lemus, PT, DPT, OCS

## 2025-06-23 NOTE — PROGRESS NOTES
Outpatient Rehab    Occupational Therapy Visit    Patient Name: Charlie Mazariegos Jr.  MRN: [id number]  YOB: 1980  Encounter Date: 6/23/2025    Therapy Diagnosis:   Encounter Diagnoses   Name Primary?    Left elbow pain Yes    Elbow stiffness, left     Pain in joint of left shoulder      Physician: Lisandra Lazaro PA-C    Physician Orders: Eval and Treat  Medical Diagnosis: Unspecified fracture of lower end of left humerus, initial encounter for closed fracture  Surgical Diagnosis: PROCEDURE(S) PERFORMED: Open reduction internal fixation intra-articular left elbow fracture with coronoid fracture ORIF Primary repair left elbow ulnar collateral ligament, CPT code 92691 Loose body removal left elbow, multiple CPT code 90240 Ulnar nerve decompression and extensive external neurolysis left elbow, CPT code 26176 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  Pain reported as 0/10.      Objective            Treatment:  Therapeutic Exercise  TE 1: Seated: :LLPS, gentle PROM: Elbow resting in extension with MHP one the elbow x 10 min with 1.5 lb cuff weight  TE 2: supine weighted stretch with 5# flex/ext 3 ways, x 10 ea  TE 3: seated :LLPS, gentle PROM: Elbow flexion, 3  planes, 10+ reps ea plane  TE 5: Seated: FA 3 planes, wrist  Pre's, 3/10 ea each plane with 10 lb dumbbell,  TE 7: FA with 3# dowel x 20  TE 9: supine overhead tricep ext and stretch with 5#  Therapeutic  Activity  TA 2: BTE- 45 sec each of the followin: wrist flex, 504 wrist ext, 504 fa sup, 504 fa pro, 302 wrist rd, 302 wrist ud 162: 3-jaw pinch (45 sec), 162: lateral pinch (30 sec), 162:  (60 sec)  TA 3: Blue flexbar twisting: FA sup, fa pro, wrist flex, wrist ext, wrist rd, wrist ud 20 reps each    Time Entry(in minutes):  Therapeutic Activity Time Entry: 15  Therapeutic Exercise Time Entry: 38    Assessment & Plan   Assessment: ROM progressing with less effort for passive stretching. Continued progression of strength with no issues   Evaluation/Treatment Tolerance: Patient tolerated treatment well    The patient will continue to benefit from skilled outpatient occupational therapy in order to address the deficits listed in the problem list on the initial evaluation, provide patient and family education, and maximize the patients level of independence in the home and community environments.     The patient's spiritual, cultural, and educational needs were considered, and the patient is agreeable to the plan of care and goals.           Plan: Continue per OT POC with focus on return to funcitonal independence.    Goals:   Resolved       Long Term Goals       Charlie will demonstrate significantly improved functional performance from re-assessment as measured by a 20 ncrease in FOTO function score.  (Met)       Start:  25    Expected End:  25    Resolved:  25         Patient will achieve left  strength that is 90% that of the .right dominant hand to improve function with ADLs/work/leisure tasks.  (Met)       Start:  25    Expected End:  25    Resolved:  25         Patient will report pain 2 out of 10 at worst during use  in IADL's to improve function with ADLs/work/leisure tasks..  (Met)       Start:  25    Expected End:  25    Resolved:  25         Patient will demo full fa rom  to improve functional grasp for ADLs/work/leisure tasks.   (Met)        Start:  03/13/25    Expected End:  06/05/25    Resolved:  05/20/25         Pt will demo full wrist arom to improve function with ADLs/work/leisure tasks.  (Met)       Start:  03/13/25    Expected End:  06/05/25    Resolved:  05/20/25            short term goals       Charlie will demonstrate significantly improved functional performance from re-assessment as measured by a 10 point Increase in FOTO function score.  (Met)       Start:  03/13/25    Expected End:  07/14/25    Resolved:  06/23/25         Patient will achieve left  strength that is 60 % that of the  right dominant hand to improve function with ADLs/work/leisure tasks.  (Met)       Start:  03/13/25    Expected End:  06/05/25    Resolved:  06/02/25         Patient will report pain 2 out of 10 at worst during use in light ADL's to improve function with ADLs/work/leisure tasks. .  (Met)       Start:  03/13/25    Expected End:  06/05/25    Resolved:  06/02/25         Patient will demo 10 to 120 degrees elbow arom for ADLs/work/leisure tasks.   (Met)       Start:  03/13/25    Expected End:  07/14/25    Resolved:  06/23/25         Pt will demo full arom FA  to improve function with ADLs/work/leisure tasks.  (Met)       Start:  03/13/25    Expected End:  07/14/25    Resolved:  06/23/25             KALIA Dawson  Supervising OT, Mariola Durand, was readily available to answer questions about the client's intervention at the time of the provision of services

## 2025-06-24 ENCOUNTER — CLINICAL SUPPORT (OUTPATIENT)
Dept: REHABILITATION | Facility: HOSPITAL | Age: 45
End: 2025-06-24
Payer: COMMERCIAL

## 2025-06-24 DIAGNOSIS — M25.512 PAIN IN JOINT OF LEFT SHOULDER: ICD-10-CM

## 2025-06-24 DIAGNOSIS — M25.522 LEFT ELBOW PAIN: Primary | ICD-10-CM

## 2025-06-24 DIAGNOSIS — M25.622 ELBOW STIFFNESS, LEFT: ICD-10-CM

## 2025-06-24 PROCEDURE — 97530 THERAPEUTIC ACTIVITIES: CPT

## 2025-06-24 PROCEDURE — 97110 THERAPEUTIC EXERCISES: CPT

## 2025-06-24 NOTE — PROGRESS NOTES
Outpatient Rehab    Occupational Therapy Visit    Patient Name: Charlie Mazariegos Jr.  MRN: [id number]  YOB: 1980  Encounter Date: 6/24/2025    Therapy Diagnosis:   Encounter Diagnoses   Name Primary?    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, CPT code 20591   Surgical Date: Not applicable for this Episode  Days Since Last Surgery: Not applicable for this Episode    Visit # / Visits Authorized:   Insurance Authorization Period:   Date of Evaluation: 3/13/2025  Plan of Care Certification: 6/2/2025 to 7/14/2025      Time In: 1500   Time Out: 1615  Total Time (in minutes): 75   Total Billable Time (in minutes): 70    FOTO:  Intake Score:  %  Survey Score 2:  %  Survey Score 3:  %    Precautions:       Subjective             Objective            Treatment:  Therapeutic Exercise  TE 1: Seated: :LLPS, gentle PROM: Elbow resting in extension with MHP one the elbow x 10 min with 1.5 lb cuff weight  TE 2: supine weighted stretch with 5# flex/ext fa neutral, x 10 ea  TE 3: seated :LLPS, gentle PROM: Elbow flexion, 3  planes, 10+ reps ea plane  TE 4: Seated: FA 3 planes, elbow Pre's,  3/10 ea each plane with 10  lb dumbbell,  TE 5: Seated: FA 3 planes, wrist  Pre's, 3/10 ea each plane with 10 lb dumbbell,  TE 7: FA with 3# dowel x 20  TE 8: Prone, 10 lbs, row and shoulder ext 3/10 reps  TE 9: supine overhead tricep ext and stretch with 5#  Therapeutic Activity  TA 2:  BTE- 45 sec each of the followin: wrist flex, 504 wrist ext, 504 fa sup, 504 fa pro, 302 wrist rd, 302 wrist ud 162: 3-jaw pinch (45 sec), 162: lateral pinch (30 sec), 162:  (60 sec)  TA 3: Blue flexbar twisting: FA sup, fa pro, wrist flex, wrist ext, wrist rd, wrist ud 20 reps each    Time Entry(in minutes):  Therapeutic Activity Time Entry: 20  Therapeutic Exercise Time Entry: 50    Assessment & Plan   Assessment:         The patient will continue to benefit from skilled outpatient occupational therapy in order to address the deficits listed in the problem list on the initial evaluation, provide patient and family education, and maximize the patients level of independence in the home and community environments.     The patient's spiritual, cultural, and educational needs were considered, and the patient is agreeable to the plan of care and goals.           Plan:      Goals:   Resolved       Long Term Goals       [place] will demonstrate significantly improved functional performance from re-assessment as measured by a 20 ncrease in FOTO function score.  (Met)       Start:  25    Expected End:  25    Resolved:  25         Patient will achieve left  strength that is 90% that of the .right dominant hand to improve function with ADLs/work/leisure tasks.  (Met)       Start:  25    Expected End:  25    Resolved:  25         Patient will report pain 2 out of 10 at worst during use  in IADL's to improve function with ADLs/work/leisure tasks..  (Met)       Start:  25    Expected End:  25    Resolved:  25         Patient will demo full fa rom  to improve functional grasp for ADLs/work/leisure tasks.   (Met)       Start:  25    Expected End:  25    Resolved:  25         Pt will demo full wrist arom to improve function with ADLs/work/leisure tasks.  (Met)       Start:  25    Expected End:  25    Resolved:  25            short  term goals       [place] will demonstrate significantly improved functional performance from re-assessment as measured by a 10 point Increase in FOTO function score.  (Met)       Start:  03/13/25    Expected End:  07/14/25    Resolved:  06/23/25         Patient will achieve left  strength that is 60 % that of the  right dominant hand to improve function with ADLs/work/leisure tasks.  (Met)       Start:  03/13/25    Expected End:  06/05/25    Resolved:  06/02/25         Patient will report pain 2 out of 10 at worst during use in light ADL's to improve function with ADLs/work/leisure tasks. .  (Met)       Start:  03/13/25    Expected End:  06/05/25    Resolved:  06/02/25         Patient will demo 10 to 120 degrees elbow arom for ADLs/work/leisure tasks.   (Met)       Start:  03/13/25    Expected End:  07/14/25    Resolved:  06/23/25         Pt will demo full arom FA  to improve function with ADLs/work/leisure tasks.  (Met)       Start:  03/13/25    Expected End:  07/14/25    Resolved:  06/23/25             Mariola Durand OT

## 2025-06-26 ENCOUNTER — CLINICAL SUPPORT (OUTPATIENT)
Dept: REHABILITATION | Facility: HOSPITAL | Age: 45
End: 2025-06-26
Payer: COMMERCIAL

## 2025-06-26 DIAGNOSIS — M25.512 PAIN IN JOINT OF LEFT SHOULDER: ICD-10-CM

## 2025-06-26 DIAGNOSIS — M25.612 DECREASED RANGE OF MOTION OF SHOULDER, LEFT: Primary | ICD-10-CM

## 2025-06-26 DIAGNOSIS — M25.522 LEFT ELBOW PAIN: Primary | ICD-10-CM

## 2025-06-26 DIAGNOSIS — M25.622 ELBOW STIFFNESS, LEFT: ICD-10-CM

## 2025-06-26 PROCEDURE — 97530 THERAPEUTIC ACTIVITIES: CPT

## 2025-06-26 PROCEDURE — 97112 NEUROMUSCULAR REEDUCATION: CPT

## 2025-06-26 PROCEDURE — 97110 THERAPEUTIC EXERCISES: CPT

## 2025-06-26 NOTE — PROGRESS NOTES
Outpatient Rehab    Physical Therapy Progress Note    Patient Name: Charlie Mazariegos Jr.  MRN: [id number]  YOB: 1980  Encounter Date: 6/26/2025    Therapy Diagnosis:   Encounter Diagnosis   Name Primary?    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  Pain reported as 0/10.      Objective            Treatment:  Balance/Neuromuscular Re-Education  NMR 6: star pattern: yellow pb band 1x15; 1x15 green pb band  NMR 8: serratus wall slides: 3x5 with yellow band, 2x5 with green  Therapeutic Activity  TA 1: CC Y and T pull downs: 3x8 20 lbs for reaching and pulling tasks  TA 2: standing Y and T with green tband 3x10 each reaching tasks  TA 3: seated landmines: 20 lbs 4x10 each for pushing, pulling and reaching  TA 4: seated face pull downs: 45 lbs 4x10  TA 5: tidal tank OH press: 3x10  TA 6: OH  carries 5 lbs above, 12 lb at side 1 min 2x each, 2 minutes 2 x each    Time Entry(in minutes):  Neuromuscular Re-Education Time Entry: 23  Therapeutic Activity Time Entry: 39    Assessment & Plan   Assessment: Resumed therapeutic activities addressing pulling, pushing and reaching. addition of OH carries; patient greatly challenged with this. Patient progressing very well demonstrating improved UE strength, ROM and UE endurance at this time. Patient would continue to benefit from outpatient PT to address remaining long term PT goals.  Evaluation/Treatment Tolerance: Patient tolerated treatment well    The patient will continue to benefit from skilled outpatient physical therapy in order to address the deficits listed in the problem list on the initial evaluation, provide patient and family education, and maximize the patients level of independence in the home and community environments.     The patient's spiritual, cultural, and educational needs were considered, and the patient is agreeable to the plan of care and goals.           Plan: Progress as tolerated per POC goals.    Goals:   Active       long term goals       Pt will improve FOTO score to </= 20% to demonstrate improvements in functional mobility including carrying, moving, and handling objects  (Progressing)       Start:  05/12/25    Expected End:  07/25/25            Pt will improve impaired shoulder MMTs 1 grade B to improve strength for household duties.  (Progressing)       Start:  05/12/25    Expected End:  07/25/25            Pt will demonstrate improved perscapular strength and endurance to show improved OH mobility and activity tolerance. (Progressing)       Start:  05/12/25    Expected End:  07/25/25              Resolved       short term goals       Pt will be independent with HEP to assist PT treatment in restoring pain free motion of the L shoulder. (Met)       Start:  05/12/25    Expected End:  06/13/25    Resolved:  06/30/25         Pt  will improve impaired shoulder MMTs 1/2 grade B to improve strength for functional tasks.  (Met)       Start:  05/12/25    Expected End:  06/13/25    Resolved:  06/30/25         Pt will demonstrate improved postural awareness requiring less than 3 VC during therapeutic activity.  (Met)       Start:  05/12/25    Expected End:  06/13/25    Resolved:  06/30/25             Lupe Lemus, PT, DPT, OCS

## 2025-06-26 NOTE — PROGRESS NOTES
Outpatient Rehab    Occupational Therapy Visit    Patient Name: Charlie Mazariegos Jr.  MRN: [id number]  YOB: 1980  Encounter Date: 6/26/2025    Therapy Diagnosis:   Encounter Diagnoses   Name Primary?    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 Reports that he is concerned as to how his elbow will handle force during apprehensions once he returns to full duty..  Pain reported as 4/10. 4/10 with more rigourous activities. Reports no pain at rest.    Objective            Treatment:  Therapeutic Exercise  TE 1: Seated: :LLPS, gentle PROM: Elbow resting in extension with MHP one the elbow x 10 min with 1.5 lb cuff weight  TE 2: supine weighted stretch with 5# flex/ext fa neutral, x 10 ea  TE 3: seated :LLPS, gentle PROM: Elbow flexion, 3  planes, 10+ reps ea plane  TE 4: Seated: FA 3 planes, elbow Pre's,  3/10 ea each plane with 10   lb dumbbell,  TE 5: Seated: FA 3 planes, wrist  Pre's, 3/10 ea each plane with 10 lb dumbbell,  TE 7: FA with 3# dowel x 20  TE 8: Prone, 10 lbs, row and shoulder ext 3/10 reps  TE 10: 20 lb kettle bell carry at side x 3 min to promote ext.  Therapeutic Activity  TA 2: BTE- 45 sec each of the followin: wrist flex, 504 wrist ext, 504 fa sup, 504 fa pro, 302 wrist rd, 302 wrist ud 162: 3-jaw pinch (45 sec), 162: lateral pinch (30 sec), 162:  (60 sec)  TA 3: Green flexbar twisting: FA sup, fa pro, wrist flex, wrist ext, wrist rd, wrist ud 20 reps each  TA 4: [place] sitck, purple line,   grenn ball carry x 3 min    Time Entry(in minutes):  Therapeutic Activity Time Entry: 25  Therapeutic Exercise Time Entry: 48    Assessment & Plan   Assessment: Charlie is showing good progress with left  strength which was 78% that of the right dominant hand upon most recent reassessement. (Normal would be @ 90%) He is tolerating greater weight with PRE's. Elbow flex is unchanged, however, is WFL. Elbow extension has improved and responds very well to heat and stretch, however, tends to recoil quickly. He reports compliance with use of Dynasplint. Expresses concern over how his elbow will handle force during apprehensions once he returns to full duty..        The patient will continue to benefit from skilled outpatient occupational therapy in order to address the deficits listed in the problem list on the initial evaluation, provide patient and family education, and maximize the patients level of independence in the home and community environments.     The patient's spiritual, cultural, and educational needs were considered, and the patient is agreeable to the plan of care and goals.           Plan: Continue per OT POC with focus on return to funcitonal independence.    Goals:   Resolved       Long Term Goals       Charlie will demonstrate significantly improved functional performance from re-assessment as measured by a 20  ncrease in FOTO function score.  (Met)       Start:  03/13/25    Expected End:  07/14/25    Resolved:  06/23/25         Patient will achieve left  strength that is 90% that of the .right dominant hand to improve function with ADLs/work/leisure tasks.  (Met)       Start:  03/13/25    Expected End:  07/14/25    Resolved:  06/23/25         Patient will report pain 2 out of 10 at worst during use  in IADL's to improve function with ADLs/work/leisure tasks..  (Met)       Start:  03/13/25    Expected End:  07/14/25    Resolved:  06/23/25         Patient will demo full fa rom  to improve functional grasp for ADLs/work/leisure tasks.   (Met)       Start:  03/13/25    Expected End:  06/05/25    Resolved:  05/20/25         Pt will demo full wrist arom to improve function with ADLs/work/leisure tasks.  (Met)       Start:  03/13/25    Expected End:  06/05/25    Resolved:  05/20/25            short term goals       Ragan will demonstrate significantly improved functional performance from re-assessment as measured by a 10 point Increase in FOTO function score.  (Met)       Start:  03/13/25    Expected End:  07/14/25    Resolved:  06/23/25         Patient will achieve left  strength that is 60 % that of the  right dominant hand to improve function with ADLs/work/leisure tasks.  (Met)       Start:  03/13/25    Expected End:  06/05/25    Resolved:  06/02/25         Patient will report pain 2 out of 10 at worst during use in light ADL's to improve function with ADLs/work/leisure tasks. .  (Met)       Start:  03/13/25    Expected End:  06/05/25    Resolved:  06/02/25         Patient will demo 10 to 120 degrees elbow arom for ADLs/work/leisure tasks.   (Met)       Start:  03/13/25    Expected End:  07/14/25    Resolved:  06/23/25         Pt will demo full arom FA  to improve function with ADLs/work/leisure tasks.  (Met)       Start:  03/13/25    Expected End:  07/14/25    Resolved:  06/23/25             Mariola Durand, OT

## 2025-06-27 ENCOUNTER — TELEPHONE (OUTPATIENT)
Dept: ORTHOPEDICS | Facility: CLINIC | Age: 45
End: 2025-06-27
Payer: COMMERCIAL

## 2025-06-27 NOTE — TELEPHONE ENCOUNTER
Spoke with patient, He confirmed appt at [facility] and will arrive 30 mins prior for xray.

## 2025-06-30 ENCOUNTER — CLINICAL SUPPORT (OUTPATIENT)
Dept: REHABILITATION | Facility: HOSPITAL | Age: 45
End: 2025-06-30
Payer: COMMERCIAL

## 2025-06-30 ENCOUNTER — OFFICE VISIT (OUTPATIENT)
Dept: ORTHOPEDICS | Facility: CLINIC | Age: 45
End: 2025-06-30
Payer: COMMERCIAL

## 2025-06-30 ENCOUNTER — HOSPITAL ENCOUNTER (OUTPATIENT)
Dept: RADIOLOGY | Facility: OTHER | Age: 45
Discharge: HOME | End: 2025-06-30
Attending: ORTHOPAEDIC SURGERY
Payer: COMMERCIAL

## 2025-06-30 DIAGNOSIS — M25.522 LEFT ELBOW PAIN: Primary | ICD-10-CM

## 2025-06-30 DIAGNOSIS — S42.402A CLOSED FRACTURE OF LEFT ELBOW, INITIAL ENCOUNTER: Primary | ICD-10-CM

## 2025-06-30 DIAGNOSIS — M25.522 LEFT ELBOW PAIN: ICD-10-CM

## 2025-06-30 DIAGNOSIS — M25.612 DECREASED RANGE OF MOTION OF SHOULDER, LEFT: Primary | ICD-10-CM

## 2025-06-30 DIAGNOSIS — M25.512 PAIN IN JOINT OF LEFT SHOULDER: ICD-10-CM

## 2025-06-30 DIAGNOSIS — M25.622 ELBOW STIFFNESS, LEFT: ICD-10-CM

## 2025-06-30 PROCEDURE — 97112 NEUROMUSCULAR REEDUCATION: CPT

## 2025-06-30 PROCEDURE — 73080 X-RAY EXAM OF ELBOW: CPT | Mod: 26,LT,, | Performed by: RADIOLOGY

## 2025-06-30 PROCEDURE — 73080 X-RAY EXAM OF ELBOW: CPT | Mod: TC,FY,LT

## 2025-06-30 PROCEDURE — 99213 OFFICE O/P EST LOW 20 MIN: CPT | Mod: S$GLB,,, | Performed by: ORTHOPAEDIC SURGERY

## 2025-06-30 PROCEDURE — 97110 THERAPEUTIC EXERCISES: CPT | Mod: CO

## 2025-06-30 PROCEDURE — 97530 THERAPEUTIC ACTIVITIES: CPT

## 2025-06-30 PROCEDURE — 97530 THERAPEUTIC ACTIVITIES: CPT | Mod: CO

## 2025-06-30 NOTE — PROGRESS NOTES
Charlie Mazariegos  presents for post-operative evaluation.  The patient is now 4.5 mos s/p ORIF left elbow with coronoid ORIF, left elbow ulnar collateral ligament repair, loose body excision of the left elbow, left ulnar nerve decompression, and primary repair of the flexor pronator myotendinous rupture with Dr. Euceda on February 26, 2025.  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  He is still in OT and using his Xiomara splint which he notes improvement with.  He has not returned to full duty at work yet.  He does note some lateral elbow irritation but this is not painful.  He returns for re-evaluation.      PE:  AA&O x 4.  NAD  HEENT:  NCAT, sclera nonicteric  Lungs:  Respirations are equal and unlabored.  CV:  2+ bilateral upper and lower extremity pulses.  MSK: The wound is healing well with no signs of erythema or warmth.  There is no drainage.  No clinical signs or symptoms of infection are present.   Full painless range motion left hand.  Ulnar motor strength is strong and full today throughout the left hand.  Light touch sensation is intact throughout the left hand today.  He can cross the 2nd and 3rd digits and has 5/5 motor strength with digital abduction and adduction.   Elbow flexion is full and the patient can touch his shoulder, extension has an a proximally 10 degree lag with terminal elbow extension.    FINDINGS:    X-rays patient's left elbow demonstrate expected postop changes with no complications and some calcification at the MCL rupture site    EMG: Normal study    A/P: Status post above, doing well  1)  at this point, Randy is doing great.  I will allow him to return to full unrestricted work duties.  Should that be difficult he will let us know and we can always back him off and give him some restrictions.  Otherwise he may continue his Xiomara splint and OT as long as they are still helpful.  The Xiomara splint has helped his flexion contracture significantly and he has a very  functional and almost normalized arc of motion at the left elbow today.  Discussed the possibility of a PT work hardening program but he would like to hold off on that for now.  Follow up with me in 6 weeks with new left elbow x-rays or sooner for any problems.

## 2025-06-30 NOTE — PROGRESS NOTES
Outpatient Rehab    Physical Therapy Visit    Patient Name: Charlie Mazariegos Jr.  MRN: [id number]  YOB: 1980  Encounter Date: 6/30/2025    Therapy Diagnosis:   Encounter Diagnosis   Name Primary?    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  Pain reported as 0/10.      Objective            Treatment:  Balance/Neuromuscular Re-Education  NMR 7: ER/ serratus perturbations: 30 seconds, 4x with green tband  NMR 8: serratus wall slides: 5x5 with green  Therapeutic Activity  TA 1: CC Y and T pull downs: 3x8 20 lbs for reaching and pulling tasks  TA 2: standing Y and T with green tband 3x10 each reaching tasks  TA 4: seated face pull downs: 45 lbs 4x10  TA 5: tidal tank OH press: 3x10    Time Entry(in minutes):  Neuromuscular Re-Education Time Entry: 23  Therapeutic Activity Time Entry:  39    Assessment & Plan   Assessment: Resumed therapeutic activities and NMR noting adequate fatigue of periscapular mm.   Evaluation/Treatment Tolerance: Patient tolerated treatment well    The patient will continue to benefit from skilled outpatient physical therapy in order to address the deficits listed in the problem list on the initial evaluation, provide patient and family education, and maximize the patients level of independence in the home and community environments.     The patient's spiritual, cultural, and educational needs were considered, and the patient is agreeable to the plan of care and goals.           Plan: Progress as tolerated per POC goals.    Goals:   Active       long term goals       Pt will improve FOTO score to </= 20% to demonstrate improvements in functional mobility including carrying, moving, and handling objects  (Progressing)       Start:  05/12/25    Expected End:  07/25/25            Pt will improve impaired shoulder MMTs 1 grade B to improve strength for household duties.  (Progressing)       Start:  05/12/25    Expected End:  07/25/25            Pt will demonstrate improved perscapular strength and endurance to show improved OH mobility and activity tolerance. (Progressing)       Start:  05/12/25    Expected End:  07/25/25              Resolved       short term goals       Pt will be independent with HEP to assist PT treatment in restoring pain free motion of the L shoulder. (Met)       Start:  05/12/25    Expected End:  06/13/25    Resolved:  06/30/25         Pt will improve impaired shoulder MMTs 1/2 grade B to improve strength for functional tasks.  (Met)       Start:  05/12/25    Expected End:  06/13/25    Resolved:  06/30/25         Pt will demonstrate improved postural awareness requiring less than 3 VC during therapeutic activity.  (Met)       Start:  05/12/25    Expected End:  06/13/25    Resolved:  06/30/25             Lupe Lemus, PT, DPT, OCS

## 2025-06-30 NOTE — PROGRESS NOTES
Outpatient Rehab    Occupational Therapy Visit    Patient Name: Charlie Mazariegos Jr.  MRN: [id number]  YOB: 1980  Encounter Date: 6/30/2025    Therapy Diagnosis:   Encounter Diagnoses   Name Primary?    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  Pain reported as 0/10.      Objective            Treatment:  Therapeutic Exercise  TE 1: Seated: :LLPS, gentle PROM: Elbow resting in extension with MHP one the elbow x 10 min with 1.5 lb cuff weight  TE 2: supine weighted stretch with 6# flex/ext fa neutral, x 10 ea  TE 3: seated :LLPS, gentle PROM: Elbow flexion, 3  planes, 10+ reps ea plane  TE 4: Seated: FA 3 planes, elbow Pre's,  3/10 ea each plane with 10  lb dumbbell,  TE 5: Seated: FA 3 planes, wrist  Pre's, 3/10 ea each plane with 10 lb dumbbell,  TE 8: Prone, 10 lbs, row and shoulder ext 3/10 reps  TE 9: supine overhead tricep ext and  stretch with 6#  TE 10: 20 lb kettle bell carry at side x 3 min to promote ext.  Therapeutic Activity  TA 2: BTE- 45 sec each of the followin: wrist flex, 504 wrist ext, 504 fa sup, 504 fa pro, 302 wrist rd, 302 wrist ud 162: 3-jaw pinch (45 sec), 162: lateral pinch (30 sec), 162:  (60 sec)  TA 4: [place] sitck, purple line,   grenn ball carry x 3 min    Time Entry(in minutes):  Therapeutic Activity Time Entry: 20  Therapeutic Exercise Time Entry: 37    Assessment & Plan   Assessment: ROM progressing well   Evaluation/Treatment Tolerance: Patient tolerated treatment well    The patient will continue to benefit from skilled outpatient occupational therapy in order to address the deficits listed in the problem list on the initial evaluation, provide patient and family education, and maximize the patients level of independence in the home and community environments.     The patient's spiritual, cultural, and educational needs were considered, and the patient is agreeable to the plan of care and goals.           Plan: Continue per OT POC with focus on return to funcitonal independence.    Goals:   Resolved       Long Term Goals       Charlie will demonstrate significantly improved functional performance from re-assessment as measured by a 20 ncrease in FOTO function score.  (Met)       Start:  25    Expected End:  25    Resolved:  25         Patient will achieve left  strength that is 90% that of the .right dominant hand to improve function with ADLs/work/leisure tasks.  (Met)       Start:  25    Expected End:  25    Resolved:  25         Patient will report pain 2 out of 10 at worst during use  in IADL's to improve function with ADLs/work/leisure tasks..  (Met)       Start:  25    Expected End:  25    Resolved:  25         Patient will demo full fa rom  to improve functional grasp for ADLs/work/leisure tasks.   (Met)       Start:  25    Expected  End:  06/05/25    Resolved:  05/20/25         Pt will demo full wrist arom to improve function with ADLs/work/leisure tasks.  (Met)       Start:  03/13/25    Expected End:  06/05/25    Resolved:  05/20/25            short term goals       [place] will demonstrate significantly improved functional performance from re-assessment as measured by a 10 point Increase in FOTO function score.  (Met)       Start:  03/13/25    Expected End:  07/14/25    Resolved:  06/23/25         Patient will achieve left  strength that is 60 % that of the  right dominant hand to improve function with ADLs/work/leisure tasks.  (Met)       Start:  03/13/25    Expected End:  06/05/25    Resolved:  06/02/25         Patient will report pain 2 out of 10 at worst during use in light ADL's to improve function with ADLs/work/leisure tasks. .  (Met)       Start:  03/13/25    Expected End:  06/05/25    Resolved:  06/02/25         Patient will demo 10 to 120 degrees elbow arom for ADLs/work/leisure tasks.   (Met)       Start:  03/13/25    Expected End:  07/14/25    Resolved:  06/23/25         Pt will demo full arom FA  to improve function with ADLs/work/leisure tasks.  (Met)       Start:  03/13/25    Expected End:  07/14/25    Resolved:  06/23/25             KALIA Dawson    Supervising OT, Mariola Durand, was readily available to answer questions about the client's intervention at the time of the provision of services

## 2025-07-01 ENCOUNTER — CLINICAL SUPPORT (OUTPATIENT)
Dept: REHABILITATION | Facility: HOSPITAL | Age: 45
End: 2025-07-01
Payer: COMMERCIAL

## 2025-07-01 DIAGNOSIS — M25.522 LEFT ELBOW PAIN: Primary | ICD-10-CM

## 2025-07-01 DIAGNOSIS — M25.512 PAIN IN JOINT OF LEFT SHOULDER: ICD-10-CM

## 2025-07-01 DIAGNOSIS — M25.622 ELBOW STIFFNESS, LEFT: ICD-10-CM

## 2025-07-01 PROCEDURE — 97110 THERAPEUTIC EXERCISES: CPT

## 2025-07-01 PROCEDURE — 97530 THERAPEUTIC ACTIVITIES: CPT

## 2025-07-01 NOTE — PROGRESS NOTES
Outpatient Rehab    Occupational Therapy Visit    Patient Name: Charlie Mazariegos Jr.  MRN: [id number]  YOB: 1980  Encounter Date: 7/1/2025    Therapy Diagnosis:   Encounter Diagnoses   Name Primary?    Left elbow pain Yes    Elbow stiffness, left     Pain in joint of left shoulder      Physician: Lisandra Lazaro PA-C    Physician Orders: Eval and Treat  Medical Diagnosis: Unspecified fracture of lower end of left humerus, initial encounter for closed fracture  Surgical Diagnosis: PROCEDURE(S) PERFORMED: Open reduction internal fixation intra-articular left elbow fracture with coronoid fracture ORIF Primary repair left elbow ulnar collateral ligament, CPT code 99206 Loose body removal left elbow, multiple CPT code 50009 Ulnar nerve decompression and extensive external neurolysis left elbow, CPT code 25203 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 Was instructed by surgeon to continue OT and progress weights used in his gym workouts. Physician offered workconditioning program, however, pt declined..    4/10 with more rigourous activities. Reports no pain at rest.    Objective            Treatment:  Therapeutic Exercise  TE 1: Seated: :LLPS, gentle PROM: Elbow resting in extension with MHP one the elbow x 10 min with 1.5 lb cuff weight  TE 2: supine weighted stretch with 6# flex/ext fa neutral, x 10 ea  TE 3: seated :LLPS, gentle  PROM: Elbow flexion, 3  planes, 10+ reps ea plane  TE 4: Seated: FA 3 planes, elbow Pre's,  3/10 ea each plane with 10  lb dumbbell,  TE 5: Seated: FA 3 planes, wrist  Pre's, 3/10 ea each plane with 10 lb dumbbell,  TE 8: Prone, 10 lbs, row and shoulder ext 3/10 reps  TE 9: supine overhead tricep ext and stretch with 6#  TE 10: 25 lb kettle bell carry at side x 5 min to promote ext.  Therapeutic Activity  TA 2: BTE- 45 sec each of the followin: wrist flex, 504 wrist ext, 504 fa sup, 504 fa pro, 302 wrist rd, 302 wrist ud 162: 3-jaw pinch (45 sec), 162: lateral pinch (30 sec), 162:  (60 sec)  TA 3: Green flexbar twisting: FA sup, fa pro, wrist flex, wrist ext, wrist rd, wrist ud 20 reps each  TA 4: [place] sitck, purple line,   grenn ball carry x 3 min    Time Entry(in minutes):  Therapeutic Activity Time Entry: 20  Therapeutic Exercise Time Entry: 40    Assessment & Plan   Assessment: Nearing discharge with HEP and gym work outs.  Evaluation/Treatment Tolerance: Patient tolerated treatment well    The patient will continue to benefit from skilled outpatient occupational therapy in order to address the deficits listed in the problem list on the initial evaluation, provide patient and family education, and maximize the patients level of independence in the home and community environments.     The patient's spiritual, cultural, and educational needs were considered, and the patient is agreeable to the plan of care and goals.           Plan: Continue per OT POC with focus on return to funcitonal independence. Nearing d/c with HEP.    Goals:   Resolved       Long Term Goals       [place] will demonstrate significantly improved functional performance from re-assessment as measured by a 20 ncrease in FOTO function score.  (Met)       Start:  25    Expected End:  25    Resolved:  25         Patient will achieve left  strength that is 90% that of the .right dominant hand to improve function with  ADLs/work/leisure tasks.  (Met)       Start:  03/13/25    Expected End:  07/14/25    Resolved:  06/23/25         Patient will report pain 2 out of 10 at worst during use  in IADL's to improve function with ADLs/work/leisure tasks..  (Met)       Start:  03/13/25    Expected End:  07/14/25    Resolved:  06/23/25         Patient will demo full fa rom  to improve functional grasp for ADLs/work/leisure tasks.   (Met)       Start:  03/13/25    Expected End:  06/05/25    Resolved:  05/20/25         Pt will demo full wrist arom to improve function with ADLs/work/leisure tasks.  (Met)       Start:  03/13/25    Expected End:  06/05/25    Resolved:  05/20/25            short term goals       Charlie will demonstrate significantly improved functional performance from re-assessment as measured by a 10 point Increase in FOTO function score.  (Met)       Start:  03/13/25    Expected End:  07/14/25    Resolved:  06/23/25         Patient will achieve left  strength that is 60 % that of the  right dominant hand to improve function with ADLs/work/leisure tasks.  (Met)       Start:  03/13/25    Expected End:  06/05/25    Resolved:  06/02/25         Patient will report pain 2 out of 10 at worst during use in light ADL's to improve function with ADLs/work/leisure tasks. .  (Met)       Start:  03/13/25    Expected End:  06/05/25    Resolved:  06/02/25         Patient will demo 10 to 120 degrees elbow arom for ADLs/work/leisure tasks.   (Met)       Start:  03/13/25    Expected End:  07/14/25    Resolved:  06/23/25         Pt will demo full arom FA  to improve function with ADLs/work/leisure tasks.  (Met)       Start:  03/13/25    Expected End:  07/14/25    Resolved:  06/23/25             Mariola Durand OT

## 2025-07-03 ENCOUNTER — CLINICAL SUPPORT (OUTPATIENT)
Dept: REHABILITATION | Facility: HOSPITAL | Age: 45
End: 2025-07-03
Payer: COMMERCIAL

## 2025-07-03 DIAGNOSIS — M25.522 LEFT ELBOW PAIN: Primary | ICD-10-CM

## 2025-07-03 DIAGNOSIS — M25.612 DECREASED RANGE OF MOTION OF SHOULDER, LEFT: Primary | ICD-10-CM

## 2025-07-03 DIAGNOSIS — M25.622 ELBOW STIFFNESS, LEFT: ICD-10-CM

## 2025-07-03 PROCEDURE — 97112 NEUROMUSCULAR REEDUCATION: CPT

## 2025-07-03 PROCEDURE — 97110 THERAPEUTIC EXERCISES: CPT

## 2025-07-03 PROCEDURE — 97530 THERAPEUTIC ACTIVITIES: CPT

## 2025-07-03 NOTE — PROGRESS NOTES
Outpatient Rehab    Occupational Therapy Visit    Patient Name: Charlie Mazariegos Jr.  MRN: [id number]  YOB: 1980  Encounter Date: 7/3/2025    Therapy Diagnosis: No diagnosis found.  Physician: Lisandra Lazaro PA-C    Physician Orders: Eval and Treat  Medical Diagnosis: Unspecified fracture of lower end of left humerus, initial encounter for closed fracture  Surgical Diagnosis: Not applicable for this Episode   Surgical Date: Not applicable for this Episode  Days Since Last Surgery: Not applicable for this Episode    Visit # / Visits Authorized: 38 / 40  Insurance Authorization Period: 3/13/2025 to 12/31/2025  Date of Evaluation:   Plan of Care Certification:       Time In: 1453   Time Out: 1600  Total Time (in minutes): 67   Total Billable Time (in minutes):      FOTO:  Intake Score:  %  Survey Score 2:  %  Survey Score 3:  %    Precautions:       Subjective             Objective            Treatment:  Therapeutic Exercise  TE 1: Seated: :LLPS, gentle PROM: Elbow resting in extension with MHP one the elbow x 10 min with 1.5 lb cuff weight  TE 2: supine weighted stretch with 8# flex/ext fa neutral, x 10 ea  TE 3: seated :LLPS, gentle PROM: Elbow flexion, 3  planes, 10+ reps ea plane  TE 4: Seated: FA 3 planes, elbow Pre's,  3/10 ea each plane with 10  lb dumbbell,  TE 5: Seated: FA 3 planes, wrist  Pre's, 3/10 ea each plane with 10 lb dumbbell,  TE 8: Prone, 10 lbs, row and shoulder ext 3/10 reps  TE 9: supine overhead tricep ext and stretch with 6#  TE 10: 25 lb kettle bell carry at side x 5 min to promote ext.  Therapeutic Activity  TA 3: Green flexbar twisting: FA sup, fa pro, wrist flex, wrist ext, wrist rd, wrist ud 20 reps each    Time Entry(in minutes):  Therapeutic Activity Time Entry: 10  Therapeutic Exercise Time Entry: 50    Assessment & Plan   Assessment: Nearing discharge with HEP and gym work outs.       The patient will continue to benefit from skilled outpatient occupational therapy  in order to address the deficits listed in the problem list on the initial evaluation, provide patient and family education, and maximize the patients level of independence in the home and community environments.     The patient's spiritual, cultural, and educational needs were considered, and the patient is agreeable to the plan of care and goals.           Plan: Continue per OT POC with focus on return to funcitonal independence. Nearing d/c with HEP.    Goals:     Mariola Durand, OT

## 2025-07-03 NOTE — PROGRESS NOTES
Outpatient Rehab    Physical Therapy Visit    Patient Name: Charlie Mazariegos Jr.  MRN: [id number]  YOB: 1980  Encounter Date: 7/3/2025    Therapy Diagnosis:   Encounter Diagnosis   Name Primary?    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 reports he was cleared to return to full duty by MD.  Pain reported as 0/10.      Objective            Treatment:  Balance/Neuromuscular Re-Education  NMR 5: BOSU planks: 20 minutes, 4x;bear crawls 3 x 16 feet  Therapeutic Activity  TA 1: CC Y and T pull downs: 3x8 25 lbs for reaching and pulling tasks  TA 2: D2 PNF green tband 3-4x10  TA 4: seated face pull downs: 45 lbs 4x10  TA 5: tidal tank OH press: 3x10  TA 6: OH carries 5 lbs above, 12 lb at side 1 min 2x each, 2 minutes 2 x each  TA 7: held    Time Entry(in minutes):  Neuromuscular Re-Education  Time Entry: 11  Therapeutic Activity Time Entry: 54    Assessment & Plan   Assessment: Resumed therapeutic activities and NMR noting adequate fatigue of periscapular mm. Challenged with BOSU planks and bear crawls emphasizing scapular and GH joint stability and WB through UE.   Evaluation/Treatment Tolerance: Patient tolerated treatment well    The patient will continue to benefit from skilled outpatient physical therapy in order to address the deficits listed in the problem list on the initial evaluation, provide patient and family education, and maximize the patients level of independence in the home and community environments.     The patient's spiritual, cultural, and educational needs were considered, and the patient is agreeable to the plan of care and goals.           Plan: Progress as tolerated per POC goals.    Goals:   Active       long term goals       Pt will improve FOTO score to </= 20% to demonstrate improvements in functional mobility including carrying, moving, and handling objects  (Progressing)       Start:  05/12/25    Expected End:  07/25/25            Pt will improve impaired shoulder MMTs 1 grade B to improve strength for household duties.  (Progressing)       Start:  05/12/25    Expected End:  07/25/25            Pt will demonstrate improved perscapular strength and endurance to show improved OH mobility and activity tolerance. (Progressing)       Start:  05/12/25    Expected End:  07/25/25              Resolved       short term goals       Pt will be independent with HEP to assist PT treatment in restoring pain free motion of the L shoulder. (Met)       Start:  05/12/25    Expected End:  06/13/25    Resolved:  06/30/25         Pt will improve impaired shoulder MMTs 1/2 grade B to improve strength for functional tasks.  (Met)       Start:  05/12/25    Expected End:  06/13/25    Resolved:  06/30/25         Pt will demonstrate improved postural awareness requiring less than 3 VC during  therapeutic activity.  (Met)       Start:  05/12/25    Expected End:  06/13/25    Resolved:  06/30/25             Lupe Lemus PT, JONAST, OCS

## 2025-07-07 ENCOUNTER — CLINICAL SUPPORT (OUTPATIENT)
Dept: REHABILITATION | Facility: HOSPITAL | Age: 45
End: 2025-07-07
Payer: COMMERCIAL

## 2025-07-07 DIAGNOSIS — M25.622 ELBOW STIFFNESS, LEFT: ICD-10-CM

## 2025-07-07 DIAGNOSIS — M25.522 LEFT ELBOW PAIN: Primary | ICD-10-CM

## 2025-07-07 DIAGNOSIS — M25.512 PAIN IN JOINT OF LEFT SHOULDER: ICD-10-CM

## 2025-07-07 PROCEDURE — 97110 THERAPEUTIC EXERCISES: CPT

## 2025-07-07 PROCEDURE — 97530 THERAPEUTIC ACTIVITIES: CPT

## 2025-07-07 NOTE — PROGRESS NOTES
Outpatient Rehab    Occupational Therapy Visit    Patient Name: Charlie Mazariegos Jr.  MRN: [id number]  YOB: 1980  Encounter Date: 7/7/2025    Therapy Diagnosis:   Encounter Diagnoses   Name Primary?    Left elbow pain Yes    Elbow stiffness, left     Pain in joint of left shoulder      Physician: Lisandra Lazaro PA-C    Physician Orders: Eval and Treat  Medical Diagnosis: Unspecified fracture of lower end of left humerus, initial encounter for closed fracture  Surgical Diagnosis: PROCEDURE(S) PERFORMED: Open reduction internal fixation intra-articular left elbow fracture with coronoid fracture ORIF Primary repair left elbow ulnar collateral ligament, CPT code 61926 Loose body removal left elbow, multiple CPT code 61653 Ulnar nerve decompression and extensive external neurolysis left elbow, CPT code 54399 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, CPT code 68224   Surgical Date: Not applicable for this Episode  Days Since Last Surgery: Not applicable for this Episode    Visit # / Visits Authorized: 39 / 40  Insurance Authorization Period: 3/13/2025 to 12/31/2025  Date of Evaluation: 3/13/2025  Plan of Care Certification: 6/2/2025 to 7/14/2025      Time In: 1530   Time Out: 1635  Total Time (in minutes): 65   Total Billable Time (in minutes): 65    FOTO:  Intake Score: 50%  Survey Score 2: 63%  Survey Score 3: 69%    Precautions:       Subjective   Charlie reports that his biggest limitation is from limited elbow extension interferring with gym workout..         Objective            Treatment:  Therapeutic Exercise  TE 1: Seated: :LLPS, gentle PROM: Elbow resting in extension with MHP one the elbow x 10 min with 1.5 lb cuff weight  TE 2: supine weighted stretch with 8# flex/ext fa neutral, x 10 ea  TE 3: seated :LLPS, gentle PROM: Elbow flexion, 3  planes, 10+ reps ea plane  TE 4: Seated: FA 3 planes, elbow Pre's,  3/10 ea each plane with 10  lb dumbbell,  TE 5: Seated: FA 3  planes, wrist  Pre's, 3/10 ea each plane with 10 lb dumbbell,  TE 8: Prone, 10 lbs, row and shoulder ext 3/10 reps  TE 9: supine overhead tricep ext and stretch with 6#  TE 10: 25 lb kettle bell carry at side x 5 min to promote ext.  Therapeutic Activity  TA 3: Green flexbar twisting: FA sup, fa pro, wrist flex, wrist ext, wrist rd, wrist ud 20 reps each    Time Entry(in minutes):  Therapeutic Activity Time Entry: 10  Therapeutic Exercise Time Entry: 50    Assessment & Plan   Assessment: FOTO score improved further.        The patient will continue to benefit from skilled outpatient occupational therapy in order to address the deficits listed in the problem list on the initial evaluation, provide patient and family education, and maximize the patients level of independence in the home and community environments.     The patient's spiritual, cultural, and educational needs were considered, and the patient is agreeable to the plan of care and goals.           Plan: Continue per OT POC with focus on return to funcitonal independence. Discharge with HEP on final session.    Goals:   Resolved       Long Term Goals       [place] will demonstrate significantly improved functional performance from re-assessment as measured by a 20 ncrease in FOTO function score.  (Met)       Start:  03/13/25    Expected End:  07/14/25    Resolved:  06/23/25         Patient will achieve left  strength that is 90% that of the .right dominant hand to improve function with ADLs/work/leisure tasks.  (Met)       Start:  03/13/25    Expected End:  07/14/25    Resolved:  06/23/25         Patient will report pain 2 out of 10 at worst during use  in IADL's to improve function with ADLs/work/leisure tasks..  (Met)       Start:  03/13/25    Expected End:  07/14/25    Resolved:  06/23/25         Patient will demo full fa rom  to improve functional grasp for ADLs/work/leisure tasks.   (Met)       Start:  03/13/25    Expected End:  06/05/25    Resolved:   05/20/25         Pt will demo full wrist arom to improve function with ADLs/work/leisure tasks.  (Met)       Start:  03/13/25    Expected End:  06/05/25    Resolved:  05/20/25            short term goals       Charlie will demonstrate significantly improved functional performance from re-assessment as measured by a 10 point Increase in FOTO function score.  (Met)       Start:  03/13/25    Expected End:  07/14/25    Resolved:  06/23/25         Patient will achieve left  strength that is 60 % that of the  right dominant hand to improve function with ADLs/work/leisure tasks.  (Met)       Start:  03/13/25    Expected End:  06/05/25    Resolved:  06/02/25         Patient will report pain 2 out of 10 at worst during use in light ADL's to improve function with ADLs/work/leisure tasks. .  (Met)       Start:  03/13/25    Expected End:  06/05/25    Resolved:  06/02/25         Patient will demo 10 to 120 degrees elbow arom for ADLs/work/leisure tasks.   (Met)       Start:  03/13/25    Expected End:  07/14/25    Resolved:  06/23/25         Pt will demo full arom FA  to improve function with ADLs/work/leisure tasks.  (Met)       Start:  03/13/25    Expected End:  07/14/25    Resolved:  06/23/25             Mariola Durand OT

## 2025-07-08 ENCOUNTER — CLINICAL SUPPORT (OUTPATIENT)
Dept: REHABILITATION | Facility: HOSPITAL | Age: 45
End: 2025-07-08
Payer: COMMERCIAL

## 2025-07-08 DIAGNOSIS — M25.522 LEFT ELBOW PAIN: Primary | ICD-10-CM

## 2025-07-08 DIAGNOSIS — M25.612 DECREASED RANGE OF MOTION OF SHOULDER, LEFT: Primary | ICD-10-CM

## 2025-07-08 DIAGNOSIS — M25.512 PAIN IN JOINT OF LEFT SHOULDER: ICD-10-CM

## 2025-07-08 DIAGNOSIS — M25.622 ELBOW STIFFNESS, LEFT: ICD-10-CM

## 2025-07-08 PROCEDURE — 97110 THERAPEUTIC EXERCISES: CPT

## 2025-07-08 PROCEDURE — 97530 THERAPEUTIC ACTIVITIES: CPT

## 2025-07-08 PROCEDURE — 97112 NEUROMUSCULAR REEDUCATION: CPT

## 2025-07-08 NOTE — PROGRESS NOTES
"  Outpatient Rehab    Physical Therapy Visit    Patient Name: Charlie Mazariegos Jr.  MRN: [id number]  YOB: 1980  Encounter Date: 7/8/2025    Therapy Diagnosis:   Encounter Diagnosis   Name Primary?    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, CPT code 12602   Surgical Date: Not applicable for this Episode  Days Since Last Surgery: Not applicable for this Episode    Visit # / Visits Authorized:  12 / 20  Insurance Authorization Period: 5/8/2025 to 12/31/2025  Date of Evaluation: 5/12/2025  Plan of Care Certification: 5/12/2025 to 7/25/2025      PT/PTA:     Number of PTA visits since last PT visit:   Time In: 1400   Time Out: 1506  Total Time (in minutes): 66   Total Billable Time (in minutes): 66    FOTO:  Intake Score: Not applicable for this Episode%  Survey Score 2: Not applicable for this Episode%  Survey Score 3: Not applicable for this Episode%    Precautions:         Subjective   doing well; no new complaints. reports he was cleared to return to full duty by MD.  Pain reported as 0/10. 4/10 with more rigourous activities. Reports no pain at rest.    Objective            Treatment:  Balance/Neuromuscular Re-Education  NMR 5: BOSU planks: 20 minutes, 4x;bear crawls 3 x 16 feet  NMR 6: star pattern 4x10 green pb band - "wall plank"  NMR 7: ER/ serratus perturbations: 30 seconds, 4x with green tband  NMR 8: serratus wall slides: 5x5 with green  Therapeutic Activity  TA 1: " CC Y and T pull downs: 3x8-15 30 lbs for reaching and pulling tasks  TA 2: D2 PNF blue tband 3-4x10  TA 3: seated landmines: 20 lbs 4x10 each for pushing, pulling and reaching  TA 4: seated face pull downs: 45 lbs 4x10  TA 5: tidal tank OH press: 3x10  TA 6: OH carries 5 lbs above, 12 lb at side 2 minutes 3 x each    Time Entry(in minutes):  Neuromuscular Re-Education Time Entry: 13  Therapeutic Activity Time Entry: 53    Assessment & Plan   Assessment: Resumed therapeutic activities and NMR noting adequate fatigue of periscapular mm. Challenged with exercises emphasizing scapular and GH joint stability and WB through UE.   Evaluation/Treatment Tolerance: Patient tolerated treatment well    The patient will continue to benefit from skilled outpatient physical therapy in order to address the deficits listed in the problem list on the initial evaluation, provide patient and family education, and maximize the patients level of independence in the home and community environments.     The patient's spiritual, cultural, and educational needs were considered, and the patient is agreeable to the plan of care and goals.           Plan: Progress as tolerated per POC goals.    Goals:   Active       long term goals       Pt will improve FOTO score to </= 20% to demonstrate improvements in functional mobility including carrying, moving, and handling objects  (Progressing)       Start:  05/12/25    Expected End:  07/25/25            Pt will improve impaired shoulder MMTs 1 grade B to improve strength for household duties.  (Progressing)       Start:  05/12/25    Expected End:  07/25/25            Pt will demonstrate improved perscapular strength and endurance to show improved OH mobility and activity tolerance. (Progressing)       Start:  05/12/25    Expected End:  07/25/25              Resolved       short term goals       Pt will be independent with HEP to assist PT treatment in restoring pain free motion of the L shoulder.  (Met)       Start:  05/12/25    Expected End:  06/13/25    Resolved:  06/30/25         Pt will improve impaired shoulder MMTs 1/2 grade B to improve strength for functional tasks.  (Met)       Start:  05/12/25    Expected End:  06/13/25    Resolved:  06/30/25         Pt will demonstrate improved postural awareness requiring less than 3 VC during therapeutic activity.  (Met)       Start:  05/12/25    Expected End:  06/13/25    Resolved:  06/30/25             Lupe Lemus, PT, DPT, OCS

## 2025-07-08 NOTE — PROGRESS NOTES
Outpatient Rehab    Occupational Therapy Discharge    Patient Name: Charlie Mazariegos Jr.  MRN: [id number]  YOB: 1980  Encounter Date: 7/8/2025    Therapy Diagnosis:   Encounter Diagnoses   Name Primary?    Left elbow pain Yes    Elbow stiffness, left     Pain in joint of left shoulder      Physician: Lisandra Lazaro PA-C    Physician Orders: Eval and Treat  Medical Diagnosis: Unspecified fracture of lower end of left humerus, initial encounter for closed fracture  Surgical Diagnosis: PROCEDURE(S) PERFORMED: Open reduction internal fixation intra-articular left elbow fracture with coronoid fracture ORIF Primary repair left elbow ulnar collateral ligament, CPT code 52011 Loose body removal left elbow, multiple CPT code 63686 Ulnar nerve decompression and extensive external neurolysis left elbow, CPT code 86525 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, CPT code 50488   Surgical Date: Not applicable for this Episode  Days Since Last Surgery: Not applicable for this Episode    Visit # / Visits Authorized: 40 / 40  Insurance Authorization Period: 3/13/2025 to 12/31/2025  Date of Evaluation: 3/13/2025  Plan of Care Certification: 6/2/2025 to 7/14/2025      Time In: 1600   Time Out: 1649  Total Time (in minutes): 49   Total Billable Time (in minutes): 49    FOTO:  Intake Score:  %  Survey Score 2:  %  Survey Score 3:  %    Precautions:       Subjective   Charlie reports that he will continue with HEP and use of the Xiomara Splint, and progress his workouts at the gym..         Objective      Elbow/Forearm Range of Motion   Left Elbow/Forearm   Active (deg) Passive (deg) Pain   Flexion 128       Extension -11       Forearm Pronation         Forearm Supination                          Treatment:  Therapeutic Exercise  TE 1: Seated: :LLPS, gentle PROM: Elbow resting in extension with MHP one the elbow x 10 min with 1.5 lb cuff weight  TE 2: supine weighted stretch with 8# flex/ext fa neutral,  x 10 ea  TE 3: reassessment of AROM.  TE 8: Prone, 10 lbs, row and shoulder ext 3/10 reps  Therapeutic Activity  TA 2: BTE- 45 sec each of the followin: wrist flex, 504 wrist ext, 504 fa sup, 504 fa pro, 302 wrist rd, 302 wrist ud 162: 3-jaw pinch (45 sec), 162: lateral pinch (30 sec), 162:  (60 sec)    Time Entry(in minutes):  Therapeutic Activity Time Entry: 10  Therapeutic Exercise Time Entry: 45    Assessment & Plan   Assessment:         The patient's spiritual, cultural, and educational needs were considered, and the patient is agreeable to the plan of care and goals.           Plan: Discharge from OT with instructed to continue with use of Xiomara Splint and HEP and to progress with weight training as tolerated and with respect to pain.    Goals:   Active       Long Term Goals       Charlie will demonstrate significantly improved functional performance from re-assessment as measured by a 20 ncrease in FOTO function score.  (Progressing)       Start:  25    Expected End:  25            Patient will achieve left  strength that is 90% that of the .right dominant hand to improve function with ADLs/work/leisure tasks.  (Progressing)       Start:  25    Expected End:  25            Patient will report pain 2 out of 10 at worst during use  in IADL's to improve function with ADLs/work/leisure tasks..  (Met)       Start:  25    Expected End:  25    Resolved:  25         Patient will demo full fa rom  to improve functional grasp for ADLs/work/leisure tasks.   (Met)       Start:  25    Expected End:  25    Resolved:  25         Pt will demo full wrist arom to improve function with ADLs/work/leisure tasks.  (Met)       Start:  25    Expected End:  25    Resolved:  25            short term goals       [place] will demonstrate significantly improved functional performance from re-assessment as measured by a 10 point Increase in FOTO function  score.  (Met)       Start:  03/13/25    Expected End:  07/14/25    Resolved:  06/23/25         Patient will achieve left  strength that is 60 % that of the  right dominant hand to improve function with ADLs/work/leisure tasks.  (Met)       Start:  03/13/25    Expected End:  06/05/25    Resolved:  06/02/25         Patient will report pain 2 out of 10 at worst during use in light ADL's to improve function with ADLs/work/leisure tasks. .  (Met)       Start:  03/13/25    Expected End:  06/05/25    Resolved:  06/02/25         Patient will demo 10 to 120 degrees elbow arom for ADLs/work/leisure tasks.   (Progressing)       Start:  03/13/25    Expected End:  07/14/25            Pt will demo full arom FA  to improve function with ADLs/work/leisure tasks.  (Met)       Start:  03/13/25    Expected End:  07/14/25    Resolved:  06/23/25             Mariola Durand OT

## 2025-07-10 ENCOUNTER — CLINICAL SUPPORT (OUTPATIENT)
Dept: REHABILITATION | Facility: HOSPITAL | Age: 45
End: 2025-07-10
Payer: COMMERCIAL

## 2025-07-10 DIAGNOSIS — M25.612 DECREASED RANGE OF MOTION OF SHOULDER, LEFT: Primary | ICD-10-CM

## 2025-07-10 PROCEDURE — 97530 THERAPEUTIC ACTIVITIES: CPT

## 2025-07-10 PROCEDURE — 97112 NEUROMUSCULAR REEDUCATION: CPT

## 2025-07-17 NOTE — PROGRESS NOTES
"  Outpatient Rehab    Physical Therapy Visit    Patient Name: Charlie Mazariegos Jr.  MRN: [id number]  YOB: 1980  Encounter Date: 7/10/2025    Therapy Diagnosis:   Encounter Diagnosis   Name Primary?    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, CPT code 69139   Surgical Date: Not applicable for this Episode  Days Since Last Surgery: Not applicable for this Episode    Visit # / Visits Authorized:  12 / 20  Insurance Authorization Period: 5/8/2025 to 12/31/2025  Date of Evaluation: 5/12/2025  Plan of Care Certification: 5/12/2025 to 7/25/2025      PT/PTA:     Number of PTA visits since last PT visit:   Time In: 1600   Time Out: 1704  Total Time (in minutes): 64   Total Billable Time (in minutes): 64    FOTO:  Intake Score: Not applicable for this Episode%  Survey Score 2: Not applicable for this Episode%  Survey Score 3: Not applicable for this Episode%    Precautions:         Subjective   doing okay today.  Pain reported as 0/10. 4/10 with more rigourous activities. Reports no pain at rest.    Objective            Treatment:  Balance/Neuromuscular Re-Education  NMR 5: BOSU planks: 20 minutes, 4x;bear crawls 3 x 16 feet  NMR 6: star pattern 4x10 green pb band - "wall plank"  NMR 8: serratus wall slides: 5x5 with green  Therapeutic Activity  TA 1: CC Y and T pull downs: 3x8-15 30 lbs for reaching and pulling tasks  TA 2: D2 PNF blue tband 3-4x10  TA 3: seated landmines: 20 lbs " 4x10 each for pushing, pulling and reaching  TA 4: seated face pull downs: 45 lbs 4x10  TA 5: tidal tank OH press: 3x10  TA 6: OH carries 5 lbs above, 12 lb at side 2 minutes 3 x each    Time Entry(in minutes):  Neuromuscular Re-Education Time Entry: 11  Therapeutic Activity Time Entry: 53    Assessment & Plan   Assessment: Resumed therapeutic activities and NMR noting adequate fatigue of periscapular mm. Challenged with exercises emphasizing scapular and GH joint stability and WB through UE.   Evaluation/Treatment Tolerance: Patient tolerated treatment well    The patient will continue to benefit from skilled outpatient physical therapy in order to address the deficits listed in the problem list on the initial evaluation, provide patient and family education, and maximize the patients level of independence in the home and community environments.     The patient's spiritual, cultural, and educational needs were considered, and the patient is agreeable to the plan of care and goals.           Plan: Progress as tolerated per POC goals.    Goals:   Active       long term goals       Pt will improve FOTO score to </= 20% to demonstrate improvements in functional mobility including carrying, moving, and handling objects  (Progressing)       Start:  05/12/25    Expected End:  07/25/25            Pt will improve impaired shoulder MMTs 1 grade B to improve strength for household duties.  (Progressing)       Start:  05/12/25    Expected End:  07/25/25            Pt will demonstrate improved perscapular strength and endurance to show improved OH mobility and activity tolerance. (Progressing)       Start:  05/12/25    Expected End:  07/25/25              Resolved       short term goals       Pt will be independent with HEP to assist PT treatment in restoring pain free motion of the L shoulder. (Met)       Start:  05/12/25    Expected End:  06/13/25    Resolved:  06/30/25         Pt will improve impaired shoulder MMTs 1/2  grade B to improve strength for functional tasks.  (Met)       Start:  05/12/25    Expected End:  06/13/25    Resolved:  06/30/25         Pt will demonstrate improved postural awareness requiring less than 3 VC during therapeutic activity.  (Met)       Start:  05/12/25    Expected End:  06/13/25    Resolved:  06/30/25             Lupe Lemus, PT, DPT, OCS

## 2025-07-18 ENCOUNTER — CLINICAL SUPPORT (OUTPATIENT)
Dept: REHABILITATION | Facility: HOSPITAL | Age: 45
End: 2025-07-18
Payer: COMMERCIAL

## 2025-07-18 DIAGNOSIS — M25.612 DECREASED RANGE OF MOTION OF SHOULDER, LEFT: Primary | ICD-10-CM

## 2025-07-18 PROCEDURE — 97530 THERAPEUTIC ACTIVITIES: CPT

## 2025-07-18 PROCEDURE — 97112 NEUROMUSCULAR REEDUCATION: CPT

## 2025-07-21 ENCOUNTER — CLINICAL SUPPORT (OUTPATIENT)
Dept: REHABILITATION | Facility: HOSPITAL | Age: 45
End: 2025-07-21
Payer: COMMERCIAL

## 2025-07-21 ENCOUNTER — PATIENT MESSAGE (OUTPATIENT)
Dept: ORTHOPEDICS | Facility: CLINIC | Age: 45
End: 2025-07-21
Payer: COMMERCIAL

## 2025-07-21 DIAGNOSIS — M25.612 DECREASED RANGE OF MOTION OF SHOULDER, LEFT: Primary | ICD-10-CM

## 2025-07-21 PROCEDURE — 97112 NEUROMUSCULAR REEDUCATION: CPT

## 2025-07-21 PROCEDURE — 97530 THERAPEUTIC ACTIVITIES: CPT

## 2025-07-21 NOTE — PROGRESS NOTES
Outpatient Rehab    Physical Therapy Visit    Patient Name: Charlie Mazariegos Jr.  MRN: [id number]  YOB: 1980  Encounter Date: 7/21/2025    Therapy Diagnosis:   Encounter Diagnosis   Name Primary?    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, CPT code 53091   Surgical Date: Not applicable for this Episode  Days Since Last Surgery: Not applicable for this Episode    Visit # / Visits Authorized:  14 / 20  Insurance Authorization Period: 5/8/2025 to 12/31/2025  Date of Evaluation: 5/12/2025  Plan of Care Certification: 5/12/2025 to 7/25/2025      PT/PTA:     Number of PTA visits since last PT visit:   Time In: 0900   Time Out: 1009  Total Time (in minutes): 69   Total Billable Time (in minutes): 69    FOTO:  Intake Score (%): Not applicable for this Episode  Survey Score 2 (%): Not applicable for this Episode  Survey Score 3 (%): Not applicable for this Episode    Precautions:         Subjective   doing okay today; no new complaints.  Pain reported as 0/10. 4/10 with more rigourous activities. Reports no pain at rest.    Objective            Treatment:  Balance/Neuromuscular Re-Education  NMR 5: BOSU planks: 20 minutes, 4x;bear crawls 3 x 16 feet  NMR 9: Scaption body blade 45 seconds, 3x  Palm down scaption 45 seconds, 3x, D2 PNF body blade: 2x10  Therapeutic Activity  TA 5: tidal tank OH press: 3x10; tidal tank OH side to side with lunge: 10 x each  TA 6: OH carries 5 lbs  above, 12 lb at side 2 minutes 3 x each    Time Entry(in minutes):  Neuromuscular Re-Education Time Entry: 42  Therapeutic Activity Time Entry: 27    Assessment & Plan   Assessment: Progressed to using body blade to address RTC and periscapular motor control and proprioception deficits. Challenged with tidal tank activities.   Evaluation/Treatment Tolerance: Patient tolerated treatment well    The patient will continue to benefit from skilled outpatient physical therapy in order to address the deficits listed in the problem list on the initial evaluation, provide patient and family education, and maximize the patients level of independence in the home and community environments.     The patient's spiritual, cultural, and educational needs were considered, and the patient is agreeable to the plan of care and goals.           Plan: Progress as tolerated per POC goals.    Goals:   Active       long term goals       Pt will improve FOTO score to </= 20% to demonstrate improvements in functional mobility including carrying, moving, and handling objects  (Progressing)       Start:  05/12/25    Expected End:  07/25/25            Pt will improve impaired shoulder MMTs 1 grade B to improve strength for household duties.  (Progressing)       Start:  05/12/25    Expected End:  07/25/25            Pt will demonstrate improved perscapular strength and endurance to show improved OH mobility and activity tolerance. (Progressing)       Start:  05/12/25    Expected End:  07/25/25              Resolved       short term goals       Pt will be independent with HEP to assist PT treatment in restoring pain free motion of the L shoulder. (Met)       Start:  05/12/25    Expected End:  06/13/25    Resolved:  06/30/25         Pt will improve impaired shoulder MMTs 1/2 grade B to improve strength for functional tasks.  (Met)       Start:  05/12/25    Expected End:  06/13/25    Resolved:  06/30/25         Pt will demonstrate improved postural  awareness requiring less than 3 VC during therapeutic activity.  (Met)       Start:  05/12/25    Expected End:  06/13/25    Resolved:  06/30/25             Lupe Lemus PT, JONAST, OCS

## 2025-07-22 ENCOUNTER — DOCUMENTATION ONLY (OUTPATIENT)
Facility: CLINIC | Age: 45
End: 2025-07-22
Payer: COMMERCIAL

## 2025-07-24 ENCOUNTER — CLINICAL SUPPORT (OUTPATIENT)
Dept: REHABILITATION | Facility: HOSPITAL | Age: 45
End: 2025-07-24
Payer: COMMERCIAL

## 2025-07-24 DIAGNOSIS — M25.612 DECREASED RANGE OF MOTION OF SHOULDER, LEFT: Primary | ICD-10-CM

## 2025-07-24 PROCEDURE — 97112 NEUROMUSCULAR REEDUCATION: CPT

## 2025-07-24 PROCEDURE — 97530 THERAPEUTIC ACTIVITIES: CPT

## 2025-07-24 NOTE — PROGRESS NOTES
Outpatient Rehab    Physical Therapy Discharge    Patient Name: Charlie Mazariegos Jr.  MRN: [id number]  YOB: 1980  Encounter Date: 7/24/2025    Therapy Diagnosis:   Encounter Diagnosis   Name Primary?    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, CPT code 12001   Surgical Date: Not applicable for this Episode  Days Since Last Surgery: Not applicable for this Episode    Visit # / Visits Authorized:  15 / 20  Insurance Authorization Period: 5/8/2025 to 12/31/2025  Date of Evaluation: 5/12/2025  Plan of Care Certification: 5/12/2025 to 7/25/2025      PT/PTA:     Number of PTA visits since last PT visit:   Time In: 1450   Time Out: 1600  Total Time (in minutes): 70   Total Billable Time (in minutes): 70    FOTO:  Intake Score (%): Not applicable for this Episode  Survey Score 2 (%): Not applicable for this Episode  Survey Score 3 (%): Not applicable for this Episode    Precautions:       Subjective   confident to discharge today..  Pain reported as 0/10.      Objective            Treatment:  Balance/Neuromuscular Re-Education  NMR 5: BOSU planks: 20 seconds, 4x; serratus punches on BOSU 3x10  NMR 9: Scaption body blade 45 seconds, 3x  Palm down scaption 45 seconds, 3x, D2 PNF body blade: 2x10  Therapeutic Activity  TA 2: D2 PNF blue tband 3-4x10  TA 4: seated face pull downs: 45 lbs 4x10  TA 5: tidal tank OH press: 3x10;  TA 6: OH carries 6 lbs above, 20 lb at side 2 minutes 3  x each    Time Entry(in minutes):  Neuromuscular Re-Education Time Entry: 30  Therapeutic Activity Time Entry: 40    Assessment & Plan   Assessment: Charlie has attended physical therapy for 15 visits for left shoulder pain and decreased ROM and has met all of their goals. They have demonstrated improvement with range of motion, strength, and FOTO score. Patient appropriate for discharge at this time and plans to continue with progressions made during physical therapy via HEP. All questions were answered and concerns were addressed. Patient discharged this date.    Evaluation/Treatment Tolerance: Patient tolerated treatment well    The patient's spiritual, cultural, and educational needs were considered, and the patient is agreeable to the plan of care and goals.           Plan: Patient is discharged from PT    Goals:   Resolved       long term goals       Pt will improve FOTO score to </= 20% to demonstrate improvements in functional mobility including carrying, moving, and handling objects  (Met)       Start:  05/12/25    Expected End:  07/25/25    Resolved:  07/30/25         Pt will improve impaired shoulder MMTs 1 grade B to improve strength for household duties.  (Met)       Start:  05/12/25    Expected End:  07/25/25    Resolved:  07/30/25         Pt will demonstrate improved perscapular strength and endurance to show improved OH mobility and activity tolerance. (Met)       Start:  05/12/25    Expected End:  07/25/25    Resolved:  07/30/25            short term goals       Pt will be independent with HEP to assist PT treatment in restoring pain free motion of the L shoulder. (Met)       Start:  05/12/25    Expected End:  06/13/25    Resolved:  06/30/25         Pt will improve impaired shoulder MMTs 1/2 grade B to improve strength for functional tasks.  (Met)       Start:  05/12/25    Expected End:  06/13/25    Resolved:  06/30/25         Pt will demonstrate improved postural awareness requiring less than 3 VC  during therapeutic activity.  (Met)       Start:  05/12/25    Expected End:  06/13/25    Resolved:  06/30/25             Lupe Lemus PT, JONAST, OCS

## 2025-07-24 NOTE — PROGRESS NOTES
"  Outpatient Rehab    Physical Therapy Visit    Patient Name: Charlie Mazariegos Jr.  MRN: [id number]  YOB: 1980  Encounter Date: 7/18/2025    Therapy Diagnosis:   Encounter Diagnosis   Name Primary?    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, CPT code 16370   Surgical Date: Not applicable for this Episode  Days Since Last Surgery: Not applicable for this Episode    Visit # / Visits Authorized:  14 / 20  Insurance Authorization Period: 5/8/2025 to 12/31/2025  Date of Evaluation: 5/12/2025  Plan of Care Certification: 5/12/2025 to 7/25/2025      PT/PTA:     Number of PTA visits since last PT visit:   Time In: 1430   Time Out: 1532  Total Time (in minutes): 62   Total Billable Time (in minutes): 62    FOTO:  Intake Score (%): Not applicable for this Episode  Survey Score 2 (%): Not applicable for this Episode  Survey Score 3 (%): Not applicable for this Episode    Precautions:         Subjective   doing okay today; no new complaints.  Pain reported as 0/10.      Objective            Treatment:  Balance/Neuromuscular Re-Education  NMR 5: BOSU planks: 20 minutes, 4x;bear crawls 3 x 16 feet  NMR 6: star pattern 4x10 green pb band - "wall plank"  NMR 8: serratus wall slides: 5x5 with green  Therapeutic Activity  TA 1: CC Y and T pull downs: 3x8-15 30 lbs for reaching and pulling tasks  TA 2: D2 PNF blue tband 3-4x10  TA 4: seated face pull downs: 45 lbs 4x10  TA 5: tidal tank OH " press: 3x10;  TA 6: OH carries 5 lbs above, 12 lb at side 2 minutes 3 x each    Time Entry(in minutes):  Neuromuscular Re-Education Time Entry: 24  Therapeutic Activity Time Entry: 38    Assessment & Plan   Assessment: Progressing well overall; significant challenge with BOSU planks and serratus push ups. Consider addition of body blade next visit. Progressing well towards discharge.   Evaluation/Treatment Tolerance: Patient tolerated treatment well    The patient will continue to benefit from skilled outpatient physical therapy in order to address the deficits listed in the problem list on the initial evaluation, provide patient and family education, and maximize the patients level of independence in the home and community environments.     The patient's spiritual, cultural, and educational needs were considered, and the patient is agreeable to the plan of care and goals.           Plan: Progress as tolerated per POC goals.    Goals:   Active       long term goals       Pt will improve FOTO score to </= 20% to demonstrate improvements in functional mobility including carrying, moving, and handling objects  (Progressing)       Start:  05/12/25    Expected End:  07/25/25            Pt will improve impaired shoulder MMTs 1 grade B to improve strength for household duties.  (Progressing)       Start:  05/12/25    Expected End:  07/25/25            Pt will demonstrate improved perscapular strength and endurance to show improved OH mobility and activity tolerance. (Progressing)       Start:  05/12/25    Expected End:  07/25/25              Resolved       short term goals       Pt will be independent with HEP to assist PT treatment in restoring pain free motion of the L shoulder. (Met)       Start:  05/12/25    Expected End:  06/13/25    Resolved:  06/30/25         Pt will improve impaired shoulder MMTs 1/2 grade B to improve strength for functional tasks.  (Met)       Start:  05/12/25    Expected End:  06/13/25     Resolved:  06/30/25         Pt will demonstrate improved postural awareness requiring less than 3 VC during therapeutic activity.  (Met)       Start:  05/12/25    Expected End:  06/13/25    Resolved:  06/30/25             Lupe Lemus, PT, DPT, OCS

## 2025-08-04 DIAGNOSIS — M25.522 LEFT ELBOW PAIN: Primary | ICD-10-CM

## 2025-08-07 ENCOUNTER — PATIENT MESSAGE (OUTPATIENT)
Dept: ORTHOPEDICS | Facility: CLINIC | Age: 45
End: 2025-08-07
Payer: COMMERCIAL

## 2025-08-08 ENCOUNTER — TELEPHONE (OUTPATIENT)
Dept: ORTHOPEDICS | Facility: CLINIC | Age: 45
End: 2025-08-08
Payer: COMMERCIAL

## 2025-08-11 ENCOUNTER — HOSPITAL ENCOUNTER (OUTPATIENT)
Dept: RADIOLOGY | Facility: OTHER | Age: 45
Discharge: HOME | End: 2025-08-11
Attending: ORTHOPAEDIC SURGERY
Payer: COMMERCIAL

## 2025-08-11 ENCOUNTER — OFFICE VISIT (OUTPATIENT)
Dept: ORTHOPEDICS | Facility: CLINIC | Age: 45
End: 2025-08-11
Payer: COMMERCIAL

## 2025-08-11 VITALS — HEIGHT: 74 IN | WEIGHT: 240.31 LBS | BODY MASS INDEX: 30.84 KG/M2

## 2025-08-11 DIAGNOSIS — M25.522 LEFT ELBOW PAIN: ICD-10-CM

## 2025-08-11 DIAGNOSIS — S42.402A CLOSED FRACTURE OF LEFT ELBOW, INITIAL ENCOUNTER: Primary | ICD-10-CM

## 2025-08-11 PROCEDURE — 99999 PR PBB SHADOW E&M-EST. PATIENT-LVL III: CPT | Mod: PBBFAC,,, | Performed by: ORTHOPAEDIC SURGERY

## 2025-08-11 PROCEDURE — 73080 X-RAY EXAM OF ELBOW: CPT | Mod: TC,LT

## 2025-08-11 PROCEDURE — 3008F BODY MASS INDEX DOCD: CPT | Mod: CPTII,S$GLB,, | Performed by: ORTHOPAEDIC SURGERY

## 2025-08-11 PROCEDURE — 73080 X-RAY EXAM OF ELBOW: CPT | Mod: 26,LT,, | Performed by: RADIOLOGY

## 2025-08-11 PROCEDURE — 99213 OFFICE O/P EST LOW 20 MIN: CPT | Mod: S$GLB,,, | Performed by: ORTHOPAEDIC SURGERY

## 2025-08-11 PROCEDURE — 1159F MED LIST DOCD IN RCRD: CPT | Mod: CPTII,S$GLB,, | Performed by: ORTHOPAEDIC SURGERY

## 2025-09-02 ENCOUNTER — TELEPHONE (OUTPATIENT)
Dept: ORTHOPEDICS | Facility: CLINIC | Age: 45
End: 2025-09-02
Payer: COMMERCIAL

## (undated) DEVICE — SPLINT PLASTER FAST SET 5X30IN

## (undated) DEVICE — SPONGE GAUZE 16PLY 4X4

## (undated) DEVICE — GOWN ECLIPSE REINF LVL4 TWL XL

## (undated) DEVICE — Device

## (undated) DEVICE — SUT VICRYL 0 27 CT-2

## (undated) DEVICE — ADHESIVE DERMABOND ADVANCED

## (undated) DEVICE — SUT ETHILON 3-0 PS2 18 BLK

## (undated) DEVICE — BLADE SURG #15 CARBON STEEL

## (undated) DEVICE — UNDERGLOVES BIOGEL PI SIZE 8

## (undated) DEVICE — PACK ECLIPSE UNIVERSAL STERILE

## (undated) DEVICE — SOL IRR SOD CHL .9% POUR

## (undated) DEVICE — SPONGE COTTON TRAY 4X4IN

## (undated) DEVICE — PAD CAST 2 IN X 4YDS STERILE

## (undated) DEVICE — DRAPE STERI-DRAPE 1000 17X11IN

## (undated) DEVICE — YANKAUER OPEN TIP W/O VENT

## (undated) DEVICE — DRAPE U SPLIT SHEET 54X76IN

## (undated) DEVICE — GLOVE BIOGEL PI MICRO SZ 7.5

## (undated) DEVICE — DRAPE C-ARMOR EQUIPMENT COVER

## (undated) DEVICE — STAPLER SKIN REGULAR

## (undated) DEVICE — CORD FOR BIPOLAR FORCEPS 12

## (undated) DEVICE — TOWEL OR DISP STRL BLUE 4/PK

## (undated) DEVICE — DRAPE ORTH SPLIT 77X108IN

## (undated) DEVICE — SPONGE LAP 18X18 PREWASHED

## (undated) DEVICE — DRESSING XEROFORM NONADH 1X8IN

## (undated) DEVICE — COVER CAMERA OPERATING ROOM

## (undated) DEVICE — SUT 4/0 18IN COATED VICRYL

## (undated) DEVICE — SUT VICRYL PLUS 3-0 SH 18IN

## (undated) DEVICE — IMPLANTABLE DEVICE
Type: IMPLANTABLE DEVICE | Site: ELBOW | Status: NON-FUNCTIONAL
Removed: 2025-02-26

## (undated) DEVICE — DRAPE STERI INSTRUMENT 1018

## (undated) DEVICE — GUIDEWIRE SNGL TRCR 1.1X150MM
Type: IMPLANTABLE DEVICE | Site: ELBOW | Status: NON-FUNCTIONAL
Removed: 2025-02-26

## (undated) DEVICE — BANDAGE MATRIX HK LOOP 2IN 5YD

## (undated) DEVICE — BANDAGE ESMARK ELASTIC ST 4X9

## (undated) DEVICE — TRAY MINOR ORTHO OMC

## (undated) DEVICE — DRAPE MINI C ARM STERILE

## (undated) DEVICE — BANDAGE MATRIX HK LOOP 4IN 5YD

## (undated) DEVICE — TOURNIQUET SB QC DP 18X4IN